# Patient Record
Sex: FEMALE | Race: WHITE | NOT HISPANIC OR LATINO | Employment: OTHER | URBAN - METROPOLITAN AREA
[De-identification: names, ages, dates, MRNs, and addresses within clinical notes are randomized per-mention and may not be internally consistent; named-entity substitution may affect disease eponyms.]

---

## 2024-02-20 ENCOUNTER — OFFICE VISIT (OUTPATIENT)
Dept: PRIMARY CARE | Facility: CLINIC | Age: 74
End: 2024-02-20
Payer: MEDICARE

## 2024-02-20 VITALS
WEIGHT: 159 LBS | HEIGHT: 57 IN | HEART RATE: 86 BPM | BODY MASS INDEX: 34.3 KG/M2 | SYSTOLIC BLOOD PRESSURE: 146 MMHG | DIASTOLIC BLOOD PRESSURE: 88 MMHG | OXYGEN SATURATION: 96 %

## 2024-02-20 DIAGNOSIS — E11.36 TYPE 2 DIABETES MELLITUS WITH DIABETIC CATARACT, WITHOUT LONG-TERM CURRENT USE OF INSULIN (MULTI): ICD-10-CM

## 2024-02-20 DIAGNOSIS — Z76.89 ENCOUNTER TO ESTABLISH CARE WITH NEW DOCTOR: ICD-10-CM

## 2024-02-20 DIAGNOSIS — R30.0 DYSURIA: ICD-10-CM

## 2024-02-20 DIAGNOSIS — R32 URINARY INCONTINENCE, UNSPECIFIED TYPE: ICD-10-CM

## 2024-02-20 DIAGNOSIS — I10 ESSENTIAL HYPERTENSION: ICD-10-CM

## 2024-02-20 DIAGNOSIS — E78.5 HYPERLIPIDEMIA, UNSPECIFIED HYPERLIPIDEMIA TYPE: ICD-10-CM

## 2024-02-20 DIAGNOSIS — J18.9 PNEUMONIA OF LEFT LOWER LOBE DUE TO INFECTIOUS ORGANISM: Primary | ICD-10-CM

## 2024-02-20 PROCEDURE — 1036F TOBACCO NON-USER: CPT | Performed by: STUDENT IN AN ORGANIZED HEALTH CARE EDUCATION/TRAINING PROGRAM

## 2024-02-20 PROCEDURE — 3077F SYST BP >= 140 MM HG: CPT | Performed by: STUDENT IN AN ORGANIZED HEALTH CARE EDUCATION/TRAINING PROGRAM

## 2024-02-20 PROCEDURE — 99204 OFFICE O/P NEW MOD 45 MIN: CPT | Performed by: STUDENT IN AN ORGANIZED HEALTH CARE EDUCATION/TRAINING PROGRAM

## 2024-02-20 PROCEDURE — G2211 COMPLEX E/M VISIT ADD ON: HCPCS | Performed by: STUDENT IN AN ORGANIZED HEALTH CARE EDUCATION/TRAINING PROGRAM

## 2024-02-20 PROCEDURE — 1159F MED LIST DOCD IN RCRD: CPT | Performed by: STUDENT IN AN ORGANIZED HEALTH CARE EDUCATION/TRAINING PROGRAM

## 2024-02-20 PROCEDURE — 3079F DIAST BP 80-89 MM HG: CPT | Performed by: STUDENT IN AN ORGANIZED HEALTH CARE EDUCATION/TRAINING PROGRAM

## 2024-02-20 RX ORDER — LEVOFLOXACIN 750 MG/1
750 TABLET ORAL DAILY
Qty: 7 TABLET | Refills: 0 | Status: SHIPPED | OUTPATIENT
Start: 2024-02-20 | End: 2024-03-04 | Stop reason: ALTCHOICE

## 2024-02-20 RX ORDER — AMLODIPINE BESYLATE 5 MG/1
TABLET ORAL DAILY
COMMUNITY
End: 2024-05-09 | Stop reason: SDUPTHER

## 2024-02-20 RX ORDER — GLIMEPIRIDE 2 MG/1
2 TABLET ORAL
COMMUNITY
End: 2024-05-09 | Stop reason: SDUPTHER

## 2024-02-20 RX ORDER — ROSUVASTATIN CALCIUM 10 MG/1
10 TABLET, COATED ORAL DAILY
COMMUNITY
End: 2024-03-04 | Stop reason: SDUPTHER

## 2024-02-20 ASSESSMENT — ENCOUNTER SYMPTOMS
CONSTIPATION: 0
DECREASED CONCENTRATION: 0
FREQUENCY: 1
NERVOUS/ANXIOUS: 1
WOUND: 0
RHINORRHEA: 0
VOMITING: 0
CHILLS: 0
DYSURIA: 1
DIARRHEA: 0
SINUS PRESSURE: 0
FATIGUE: 0
BLOOD IN STOOL: 0
FEVER: 0
COUGH: 1
DIZZINESS: 1
PALPITATIONS: 0
LIGHT-HEADEDNESS: 1
SINUS PAIN: 0
MYALGIAS: 0
ARTHRALGIAS: 0
SHORTNESS OF BREATH: 1
WHEEZING: 0

## 2024-02-20 ASSESSMENT — PATIENT HEALTH QUESTIONNAIRE - PHQ9
1. LITTLE INTEREST OR PLEASURE IN DOING THINGS: SEVERAL DAYS
2. FEELING DOWN, DEPRESSED OR HOPELESS: SEVERAL DAYS
SUM OF ALL RESPONSES TO PHQ9 QUESTIONS 1 AND 2: 2

## 2024-02-20 NOTE — PROGRESS NOTES
"Subjective   Patient ID: Vee Pimentel is a 73 y.o. female with past medical history of who presents for Establish Care (Urinary incontinence. Left eye issues. Cough for 6 weeks.  ).    Patient is here to establish care.  Recently moved to [place] from [place].    Patient has been having incontinence of urine, rare incontinence of stool for past 6 weeks.  Balance has been a problem for the same time period.     Patient has been having a cough productive of sputum for approximately 2 months.  Had a course of Keflex without improvement.     PMHX: HTN, DM2  SHX: appendectomy, hysterectomy, tonsillectomy, carpal tunnel release, cataract, retina detachment  Social: no tobacco, no EtOH.    Family: none known        Review of Systems   Constitutional:  Negative for chills, fatigue and fever.   HENT:  Negative for congestion, rhinorrhea, sinus pressure and sinus pain.    Respiratory:  Positive for cough and shortness of breath. Negative for wheezing.    Cardiovascular:  Negative for chest pain, palpitations and leg swelling.   Gastrointestinal:  Negative for blood in stool, constipation, diarrhea and vomiting.   Genitourinary:  Positive for dysuria, frequency and urgency.        Urinary incontinence   Musculoskeletal:  Negative for arthralgias and myalgias.   Skin:  Negative for pallor, rash and wound.   Neurological:  Positive for dizziness and light-headedness.   Psychiatric/Behavioral:  Negative for decreased concentration. The patient is nervous/anxious.        Objective     Visit Vitals  /88   Pulse 86   Ht 1.448 m (4' 9\")   Wt 72.1 kg (159 lb)   SpO2 96%   BMI 34.41 kg/m²   Smoking Status Never   BSA 1.7 m²         Physical Exam  Constitutional:       Appearance: Normal appearance.   HENT:      Head: Normocephalic and atraumatic.      Right Ear: Tympanic membrane, ear canal and external ear normal.      Left Ear: Tympanic membrane, ear canal and external ear normal.      Nose: Nose normal.      Mouth/Throat:      " Mouth: Mucous membranes are moist.      Pharynx: Oropharynx is clear.   Eyes:      Extraocular Movements: Extraocular movements intact.      Conjunctiva/sclera: Conjunctivae normal.   Cardiovascular:      Rate and Rhythm: Normal rate and regular rhythm.      Pulses:           Dorsalis pedis pulses are 1+ on the right side and 1+ on the left side.        Posterior tibial pulses are 1+ on the right side and 1+ on the left side.      Heart sounds: Normal heart sounds.   Pulmonary:      Effort: Pulmonary effort is normal.      Breath sounds: Rales (left lower fields) present.   Abdominal:      General: There is no distension.      Palpations: Abdomen is soft.      Tenderness: There is no abdominal tenderness.   Skin:     General: Skin is warm and dry.   Neurological:      Mental Status: She is alert.      Gait: Abnormal gait: shuffling gait.   Psychiatric:         Mood and Affect: Mood normal.         Behavior: Behavior normal.         Assessment/Plan   Problem List Items Addressed This Visit    None  Visit Diagnoses       Pneumonia of left lower lobe due to infectious organism    -  Primary    Relevant Orders    XR chest 2 views    Type 2 diabetes mellitus with diabetic cataract, without long-term current use of insulin (CMS/[facility])        Relevant Orders    CBC and Auto Differential    Comprehensive metabolic panel    Hemoglobin A1c    Tsh With Reflex To Free T4 If Abnormal    Hyperlipidemia, unspecified hyperlipidemia type        Relevant Orders    Comprehensive metabolic panel    Lipid Panel    Essential hypertension        Relevant Orders    CBC and Auto Differential    Comprehensive metabolic panel    Tsh With Reflex To Free T4 If Abnormal        Patient's presentation today somewhat concerning for NPH, however picture is clouded by active acute illness.  Would consider advanced imaging of head/brain once currently illness is adequately treated.     Patient seen and discussed with attending physician, Dr Kvng Kaminski  [place],  PGY3  Family Medicine

## 2024-02-21 ENCOUNTER — HOSPITAL ENCOUNTER (OUTPATIENT)
Dept: RADIOLOGY | Facility: CLINIC | Age: 74
Discharge: HOME | End: 2024-02-21
Payer: MEDICARE

## 2024-02-21 ENCOUNTER — LAB (OUTPATIENT)
Dept: LAB | Facility: LAB | Age: 74
End: 2024-02-21
Payer: MEDICARE

## 2024-02-21 DIAGNOSIS — I10 ESSENTIAL HYPERTENSION: ICD-10-CM

## 2024-02-21 DIAGNOSIS — E78.5 HYPERLIPIDEMIA, UNSPECIFIED HYPERLIPIDEMIA TYPE: ICD-10-CM

## 2024-02-21 DIAGNOSIS — J18.9 PNEUMONIA OF LEFT LOWER LOBE DUE TO INFECTIOUS ORGANISM: ICD-10-CM

## 2024-02-21 DIAGNOSIS — E11.36 TYPE 2 DIABETES MELLITUS WITH DIABETIC CATARACT, WITHOUT LONG-TERM CURRENT USE OF INSULIN (MULTI): ICD-10-CM

## 2024-02-21 LAB
ALBUMIN SERPL BCP-MCNC: 3.6 G/DL (ref 3.4–5)
ALP SERPL-CCNC: 76 U/L (ref 33–136)
ALT SERPL W P-5'-P-CCNC: 13 U/L (ref 7–45)
ANION GAP SERPL CALC-SCNC: 15 MMOL/L (ref 10–20)
AST SERPL W P-5'-P-CCNC: 13 U/L (ref 9–39)
BASOPHILS # BLD AUTO: 0.03 X10*3/UL (ref 0–0.1)
BASOPHILS NFR BLD AUTO: 0.4 %
BILIRUB SERPL-MCNC: 0.4 MG/DL (ref 0–1.2)
BUN SERPL-MCNC: 12 MG/DL (ref 6–23)
CALCIUM SERPL-MCNC: 9.7 MG/DL (ref 8.6–10.3)
CHLORIDE SERPL-SCNC: 101 MMOL/L (ref 98–107)
CHOLEST SERPL-MCNC: 265 MG/DL (ref 0–199)
CHOLESTEROL/HDL RATIO: 5.4
CO2 SERPL-SCNC: 26 MMOL/L (ref 21–32)
CREAT SERPL-MCNC: 0.68 MG/DL (ref 0.5–1.05)
EGFRCR SERPLBLD CKD-EPI 2021: >90 ML/MIN/1.73M*2
EOSINOPHIL # BLD AUTO: 0.07 X10*3/UL (ref 0–0.4)
EOSINOPHIL NFR BLD AUTO: 1 %
ERYTHROCYTE [DISTWIDTH] IN BLOOD BY AUTOMATED COUNT: 14.3 % (ref 11.5–14.5)
GLUCOSE SERPL-MCNC: 213 MG/DL (ref 74–99)
HCT VFR BLD AUTO: 41.9 % (ref 36–46)
HDLC SERPL-MCNC: 49.3 MG/DL
HGB BLD-MCNC: 13.7 G/DL (ref 12–16)
IMM GRANULOCYTES # BLD AUTO: 0.03 X10*3/UL (ref 0–0.5)
IMM GRANULOCYTES NFR BLD AUTO: 0.4 % (ref 0–0.9)
LDLC SERPL CALC-MCNC: 169 MG/DL
LYMPHOCYTES # BLD AUTO: 2.26 X10*3/UL (ref 0.8–3)
LYMPHOCYTES NFR BLD AUTO: 32.8 %
MCH RBC QN AUTO: 30.4 PG (ref 26–34)
MCHC RBC AUTO-ENTMCNC: 32.7 G/DL (ref 32–36)
MCV RBC AUTO: 93 FL (ref 80–100)
MONOCYTES # BLD AUTO: 0.53 X10*3/UL (ref 0.05–0.8)
MONOCYTES NFR BLD AUTO: 7.7 %
NEUTROPHILS # BLD AUTO: 3.98 X10*3/UL (ref 1.6–5.5)
NEUTROPHILS NFR BLD AUTO: 57.7 %
NON HDL CHOLESTEROL: 216 MG/DL (ref 0–149)
NRBC BLD-RTO: 0 /100 WBCS (ref 0–0)
PLATELET # BLD AUTO: 297 X10*3/UL (ref 150–450)
POTASSIUM SERPL-SCNC: 4.3 MMOL/L (ref 3.5–5.3)
PROT SERPL-MCNC: 7.2 G/DL (ref 6.4–8.2)
RBC # BLD AUTO: 4.51 X10*6/UL (ref 4–5.2)
SODIUM SERPL-SCNC: 138 MMOL/L (ref 136–145)
TRIGL SERPL-MCNC: 232 MG/DL (ref 0–149)
TSH SERPL-ACNC: 1.1 MIU/L (ref 0.44–3.98)
VLDL: 46 MG/DL (ref 0–40)
WBC # BLD AUTO: 6.9 X10*3/UL (ref 4.4–11.3)

## 2024-02-21 PROCEDURE — 36415 COLL VENOUS BLD VENIPUNCTURE: CPT

## 2024-02-21 PROCEDURE — 83036 HEMOGLOBIN GLYCOSYLATED A1C: CPT

## 2024-02-21 PROCEDURE — 84443 ASSAY THYROID STIM HORMONE: CPT

## 2024-02-21 PROCEDURE — 80053 COMPREHEN METABOLIC PANEL: CPT

## 2024-02-21 PROCEDURE — 71046 X-RAY EXAM CHEST 2 VIEWS: CPT

## 2024-02-21 PROCEDURE — 71046 X-RAY EXAM CHEST 2 VIEWS: CPT | Performed by: RADIOLOGY

## 2024-02-21 PROCEDURE — 85025 COMPLETE CBC W/AUTO DIFF WBC: CPT

## 2024-02-21 PROCEDURE — 80061 LIPID PANEL: CPT

## 2024-02-22 ENCOUNTER — TELEPHONE (OUTPATIENT)
Dept: PRIMARY CARE | Facility: CLINIC | Age: 74
End: 2024-02-22

## 2024-02-22 ENCOUNTER — LAB (OUTPATIENT)
Dept: LAB | Facility: LAB | Age: 74
End: 2024-02-22
Payer: MEDICARE

## 2024-02-22 DIAGNOSIS — R30.0 DYSURIA: ICD-10-CM

## 2024-02-22 DIAGNOSIS — R32 URINARY INCONTINENCE, UNSPECIFIED TYPE: ICD-10-CM

## 2024-02-22 LAB
AMORPH CRY #/AREA UR COMP ASSIST: ABNORMAL /HPF
APPEARANCE UR: ABNORMAL
BACTERIA #/AREA URNS AUTO: ABNORMAL /HPF
BILIRUB UR STRIP.AUTO-MCNC: NEGATIVE MG/DL
COLOR UR: YELLOW
EST. AVERAGE GLUCOSE BLD GHB EST-MCNC: 212 MG/DL
GLUCOSE UR STRIP.AUTO-MCNC: NEGATIVE MG/DL
HBA1C MFR BLD: 9 %
KETONES UR STRIP.AUTO-MCNC: NEGATIVE MG/DL
LEUKOCYTE ESTERASE UR QL STRIP.AUTO: ABNORMAL
MUCOUS THREADS #/AREA URNS AUTO: ABNORMAL /LPF
NITRITE UR QL STRIP.AUTO: NEGATIVE
PH UR STRIP.AUTO: 5 [PH]
PROT UR STRIP.AUTO-MCNC: NEGATIVE MG/DL
RBC # UR STRIP.AUTO: NEGATIVE /UL
RBC #/AREA URNS AUTO: ABNORMAL /HPF
SP GR UR STRIP.AUTO: 1.01
UROBILINOGEN UR STRIP.AUTO-MCNC: <2 MG/DL
WBC #/AREA URNS AUTO: ABNORMAL /HPF

## 2024-02-22 PROCEDURE — 87086 URINE CULTURE/COLONY COUNT: CPT

## 2024-02-22 PROCEDURE — 81001 URINALYSIS AUTO W/SCOPE: CPT

## 2024-02-23 LAB
BACTERIA UR CULT: NO GROWTH
BACTERIA UR CULT: NORMAL
HOLD SPECIMEN: NORMAL

## 2024-02-23 NOTE — TELEPHONE ENCOUNTER
I'm not seeing any authorization to release information in her chart.  I do not believe I can discuss with sister without this.  Patient is also scheduled for follow up on 3/4/24

## 2024-02-26 NOTE — TELEPHONE ENCOUNTER
Racquel, please see Dr. Last's reply. We specifically told Vee we are discussing all of her labs on 3/4/24 since there's a lot we have to go over. If Vee can bring all of her meds to her next appt, that'll be great. Thanks.

## 2024-02-28 PROBLEM — I10 PRIMARY HYPERTENSION: Status: ACTIVE | Noted: 2024-02-28

## 2024-02-28 PROBLEM — E11.9 TYPE 2 DIABETES MELLITUS (MULTI): Status: ACTIVE | Noted: 2024-02-28

## 2024-02-28 PROBLEM — R32 URINARY INCONTINENCE: Status: ACTIVE | Noted: 2024-02-28

## 2024-02-28 PROBLEM — J18.9 PNEUMONIA DUE TO INFECTIOUS ORGANISM: Status: ACTIVE | Noted: 2024-02-28

## 2024-02-28 PROBLEM — R30.0 DYSURIA: Status: ACTIVE | Noted: 2024-02-28

## 2024-02-28 PROBLEM — E78.5 HYPERLIPIDEMIA: Status: ACTIVE | Noted: 2024-02-28

## 2024-03-04 ENCOUNTER — OFFICE VISIT (OUTPATIENT)
Dept: PRIMARY CARE | Facility: CLINIC | Age: 74
End: 2024-03-04

## 2024-03-04 VITALS
SYSTOLIC BLOOD PRESSURE: 142 MMHG | BODY MASS INDEX: 36.46 KG/M2 | OXYGEN SATURATION: 97 % | HEART RATE: 101 BPM | HEIGHT: 57 IN | DIASTOLIC BLOOD PRESSURE: 85 MMHG | WEIGHT: 169 LBS

## 2024-03-04 DIAGNOSIS — R42 VERTIGO: ICD-10-CM

## 2024-03-04 DIAGNOSIS — E11.65 TYPE 2 DIABETES MELLITUS WITH HYPERGLYCEMIA, WITHOUT LONG-TERM CURRENT USE OF INSULIN (MULTI): Primary | ICD-10-CM

## 2024-03-04 DIAGNOSIS — Z79.899 MEDICATION MANAGEMENT: ICD-10-CM

## 2024-03-04 DIAGNOSIS — I10 PRIMARY HYPERTENSION: ICD-10-CM

## 2024-03-04 DIAGNOSIS — R32 URINARY INCONTINENCE, UNSPECIFIED TYPE: ICD-10-CM

## 2024-03-04 DIAGNOSIS — J18.9 PNEUMONIA OF LEFT LOWER LOBE DUE TO INFECTIOUS ORGANISM: ICD-10-CM

## 2024-03-04 DIAGNOSIS — E78.2 MIXED HYPERLIPIDEMIA: ICD-10-CM

## 2024-03-04 PROBLEM — S83.206D: Status: ACTIVE | Noted: 2024-03-04

## 2024-03-04 PROCEDURE — 3050F LDL-C >= 130 MG/DL: CPT | Performed by: STUDENT IN AN ORGANIZED HEALTH CARE EDUCATION/TRAINING PROGRAM

## 2024-03-04 PROCEDURE — 3079F DIAST BP 80-89 MM HG: CPT | Performed by: STUDENT IN AN ORGANIZED HEALTH CARE EDUCATION/TRAINING PROGRAM

## 2024-03-04 PROCEDURE — G2211 COMPLEX E/M VISIT ADD ON: HCPCS | Performed by: STUDENT IN AN ORGANIZED HEALTH CARE EDUCATION/TRAINING PROGRAM

## 2024-03-04 PROCEDURE — 3077F SYST BP >= 140 MM HG: CPT | Performed by: STUDENT IN AN ORGANIZED HEALTH CARE EDUCATION/TRAINING PROGRAM

## 2024-03-04 PROCEDURE — 1159F MED LIST DOCD IN RCRD: CPT | Performed by: STUDENT IN AN ORGANIZED HEALTH CARE EDUCATION/TRAINING PROGRAM

## 2024-03-04 PROCEDURE — 1036F TOBACCO NON-USER: CPT | Performed by: STUDENT IN AN ORGANIZED HEALTH CARE EDUCATION/TRAINING PROGRAM

## 2024-03-04 PROCEDURE — 99215 OFFICE O/P EST HI 40 MIN: CPT | Performed by: STUDENT IN AN ORGANIZED HEALTH CARE EDUCATION/TRAINING PROGRAM

## 2024-03-04 PROCEDURE — 4010F ACE/ARB THERAPY RXD/TAKEN: CPT | Performed by: STUDENT IN AN ORGANIZED HEALTH CARE EDUCATION/TRAINING PROGRAM

## 2024-03-04 PROCEDURE — 3052F HG A1C>EQUAL 8.0%<EQUAL 9.0%: CPT | Performed by: STUDENT IN AN ORGANIZED HEALTH CARE EDUCATION/TRAINING PROGRAM

## 2024-03-04 RX ORDER — LOSARTAN POTASSIUM 100 MG/1
100 TABLET ORAL DAILY
COMMUNITY
End: 2024-05-09 | Stop reason: SDUPTHER

## 2024-03-04 RX ORDER — ROSUVASTATIN CALCIUM 20 MG/1
20 TABLET, COATED ORAL DAILY
Qty: 90 TABLET | Refills: 0 | Status: SHIPPED | OUTPATIENT
Start: 2024-03-04 | End: 2024-05-09 | Stop reason: SDUPTHER

## 2024-03-04 RX ORDER — DOXYCYCLINE 100 MG/1
100 CAPSULE ORAL 2 TIMES DAILY
Qty: 14 CAPSULE | Refills: 0 | Status: SHIPPED | OUTPATIENT
Start: 2024-03-04 | End: 2024-03-11

## 2024-03-04 ASSESSMENT — ENCOUNTER SYMPTOMS
DIARRHEA: 0
CONSTIPATION: 0
MYALGIAS: 0
LIGHT-HEADEDNESS: 1
ARTHRALGIAS: 0
WHEEZING: 0
WOUND: 0
NERVOUS/ANXIOUS: 0
SINUS PRESSURE: 0
SHORTNESS OF BREATH: 0
DYSPHORIC MOOD: 0
FREQUENCY: 0
DYSURIA: 0
FEVER: 0
SINUS PAIN: 0
FATIGUE: 0
NUMBNESS: 0
PALPITATIONS: 0
RHINORRHEA: 0
NAUSEA: 0
COUGH: 1
DIZZINESS: 1
CHILLS: 0
VOMITING: 0

## 2024-03-04 ASSESSMENT — PATIENT HEALTH QUESTIONNAIRE - PHQ9
2. FEELING DOWN, DEPRESSED OR HOPELESS: NOT AT ALL
SUM OF ALL RESPONSES TO PHQ9 QUESTIONS 1 AND 2: 0
1. LITTLE INTEREST OR PLEASURE IN DOING THINGS: NOT AT ALL

## 2024-03-04 NOTE — PROGRESS NOTES
"Subjective   Patient ID: eVe Pimentel is a 73 y.o. female with past medical history of who presents for No chief complaint on file..    Patient presents today in follow up.      Cough has improved since last visit, but not entirely resolved.      Patient continues to have trouble with incontinence, as well as walking which is not at her baseline.          Review of Systems   Constitutional:  Negative for chills, fatigue and fever.   HENT:  Negative for congestion, rhinorrhea, sinus pressure and sinus pain.    Respiratory:  Positive for cough. Negative for shortness of breath and wheezing.    Cardiovascular:  Positive for leg swelling. Negative for chest pain and palpitations.   Gastrointestinal:  Negative for constipation, diarrhea, nausea and vomiting.   Genitourinary:  Negative for dysuria, frequency and urgency.        Urinary incontinence   Musculoskeletal:  Negative for arthralgias and myalgias.   Skin:  Negative for pallor, rash and wound.   Neurological:  Positive for dizziness and light-headedness. Negative for syncope and numbness.   Psychiatric/Behavioral:  Negative for dysphoric mood. The patient is not nervous/anxious.        Objective     Visit Vitals  /85   Pulse 101   Ht 1.448 m (4' 9\")   Wt 76.7 kg (169 lb)   SpO2 97%   BMI 36.57 kg/m²   Smoking Status Never   BSA 1.76 m²         Physical Exam  Constitutional:       Appearance: Normal appearance.   HENT:      Head: Normocephalic and atraumatic.      Right Ear: External ear normal.      Left Ear: External ear normal.      Nose: Nose normal.   Eyes:      Conjunctiva/sclera: Conjunctivae normal.   Cardiovascular:      Rate and Rhythm: Normal rate and regular rhythm.      Pulses: Normal pulses.   Pulmonary:      Effort: Pulmonary effort is normal.      Breath sounds: Rales (rales once again heart in left lower lung fields) present.   Abdominal:      General: Abdomen is flat.      Palpations: Abdomen is soft.   Musculoskeletal:      Right lower leg: " Edema present.      Left lower leg: Edema present.   Skin:     General: Skin is warm and dry.   Neurological:      Mental Status: She is alert and oriented to person, place, and time.      Gait: Gait abnormal.   Psychiatric:         Mood and Affect: Mood normal.         Behavior: Behavior normal.         Assessment/Plan   Problem List Items Addressed This Visit       Hyperlipidemia    Relevant Medications    rosuvastatin (Crestor) 20 mg tablet    Pneumonia due to infectious organism    Relevant Medications    doxycycline (Vibramycin) 100 mg capsule    Primary hypertension    Relevant Orders    Transthoracic Echo Complete    ECG 12 lead (Ancillary Performed)    Type 2 diabetes mellitus (CMS/HCC) - Primary    Urinary incontinence    Relevant Orders    CT head wo IV contrast     Other Visit Diagnoses       Vertigo        Relevant Orders    CT head wo IV contrast        Increased rosuvastatin to 20 mg daily.      Patient still with left lower lung crackles on exam.  Levofloxacin may not have been appropriately targeted, will try doxycycline.  Also on the differential would be heart failure, so will obtain transthoracic echocardiogram.     Patient's symptoms of memory/cognitive changes, gait disturbance, and urinary incontinence are concerning for NPH or other intracranial pathology when taken as a whole.  Will obtain CT head, consider MRI brain if negative.     Patient given sample starter pack of Rybelsus, plan to increase dose if tolerated.  Two week supply of Jardiance 25mg every day also provided.    Patient seen and discussed with attending physician, Dr Kvng Last, DO PGY3  Family Medicine

## 2024-03-26 ENCOUNTER — APPOINTMENT (OUTPATIENT)
Dept: RADIOLOGY | Facility: HOSPITAL | Age: 74
End: 2024-03-26

## 2024-03-26 ENCOUNTER — TELEPHONE (OUTPATIENT)
Dept: PRIMARY CARE | Facility: CLINIC | Age: 74
End: 2024-03-26

## 2024-03-26 DIAGNOSIS — B37.31 YEAST VAGINITIS: Primary | ICD-10-CM

## 2024-03-26 RX ORDER — FLUCONAZOLE 150 MG/1
150 TABLET ORAL ONCE
Qty: 1 TABLET | Refills: 0 | Status: SHIPPED | OUTPATIENT
Start: 2024-03-26 | End: 2024-03-26

## 2024-03-26 NOTE — TELEPHONE ENCOUNTER
Patients sister called stating that she has a bad yeast infection due to the antibiotic that was prescribed a few weeks ago.  They are asking if you would be willing to send in diflucan for her.

## 2024-04-23 ENCOUNTER — HOSPITAL ENCOUNTER (OUTPATIENT)
Dept: RADIOLOGY | Facility: HOSPITAL | Age: 74
Discharge: HOME | End: 2024-04-23
Payer: MEDICARE

## 2024-04-23 DIAGNOSIS — R42 VERTIGO: ICD-10-CM

## 2024-04-23 DIAGNOSIS — R32 URINARY INCONTINENCE, UNSPECIFIED TYPE: ICD-10-CM

## 2024-04-23 PROCEDURE — 70450 CT HEAD/BRAIN W/O DYE: CPT | Performed by: RADIOLOGY

## 2024-04-23 PROCEDURE — 70450 CT HEAD/BRAIN W/O DYE: CPT

## 2024-04-25 ENCOUNTER — HOSPITAL ENCOUNTER (OUTPATIENT)
Dept: CARDIOLOGY | Facility: HOSPITAL | Age: 74
Discharge: HOME | End: 2024-04-25
Payer: MEDICARE

## 2024-04-25 DIAGNOSIS — I10 PRIMARY HYPERTENSION: ICD-10-CM

## 2024-04-25 PROCEDURE — 93306 TTE W/DOPPLER COMPLETE: CPT

## 2024-04-25 PROCEDURE — 93306 TTE W/DOPPLER COMPLETE: CPT | Performed by: INTERNAL MEDICINE

## 2024-04-30 LAB
AORTIC VALVE MEAN GRADIENT: 5 MMHG
AORTIC VALVE PEAK VELOCITY: 1.47 M/S
AV PEAK GRADIENT: 8.6 MMHG
AVA (PEAK VEL): 2.01 CM2
AVA (VTI): 2.09 CM2
EJECTION FRACTION APICAL 4 CHAMBER: 62.7
LEFT ATRIUM VOLUME AREA LENGTH INDEX BSA: 15.8 ML/M2
LEFT VENTRICLE INTERNAL DIMENSION DIASTOLE: 3.43 CM (ref 3.5–6)
LEFT VENTRICULAR OUTFLOW TRACT DIAMETER: 2 CM
LV EJECTION FRACTION BIPLANE: 62 %
MITRAL VALVE E/A RATIO: 0.66
MITRAL VALVE E/E' RATIO: 9.5
RIGHT VENTRICLE FREE WALL PEAK S': 13.4 CM/S
RIGHT VENTRICLE PEAK SYSTOLIC PRESSURE: 24.1 MMHG
TRICUSPID ANNULAR PLANE SYSTOLIC EXCURSION: 2 CM

## 2024-05-09 ENCOUNTER — OFFICE VISIT (OUTPATIENT)
Dept: PRIMARY CARE | Facility: CLINIC | Age: 74
End: 2024-05-09
Payer: MEDICARE

## 2024-05-09 VITALS
WEIGHT: 169 LBS | HEIGHT: 57 IN | HEART RATE: 89 BPM | OXYGEN SATURATION: 98 % | SYSTOLIC BLOOD PRESSURE: 140 MMHG | DIASTOLIC BLOOD PRESSURE: 84 MMHG | BODY MASS INDEX: 36.46 KG/M2

## 2024-05-09 DIAGNOSIS — G91.2 NPH (NORMAL PRESSURE HYDROCEPHALUS) (MULTI): Primary | ICD-10-CM

## 2024-05-09 DIAGNOSIS — E78.2 MIXED HYPERLIPIDEMIA: ICD-10-CM

## 2024-05-09 DIAGNOSIS — I10 PRIMARY HYPERTENSION: ICD-10-CM

## 2024-05-09 DIAGNOSIS — E11.65 TYPE 2 DIABETES MELLITUS WITH HYPERGLYCEMIA, WITHOUT LONG-TERM CURRENT USE OF INSULIN (MULTI): ICD-10-CM

## 2024-05-09 PROCEDURE — 3079F DIAST BP 80-89 MM HG: CPT | Performed by: STUDENT IN AN ORGANIZED HEALTH CARE EDUCATION/TRAINING PROGRAM

## 2024-05-09 PROCEDURE — 3050F LDL-C >= 130 MG/DL: CPT | Performed by: STUDENT IN AN ORGANIZED HEALTH CARE EDUCATION/TRAINING PROGRAM

## 2024-05-09 PROCEDURE — 4010F ACE/ARB THERAPY RXD/TAKEN: CPT | Performed by: STUDENT IN AN ORGANIZED HEALTH CARE EDUCATION/TRAINING PROGRAM

## 2024-05-09 PROCEDURE — 99214 OFFICE O/P EST MOD 30 MIN: CPT | Performed by: STUDENT IN AN ORGANIZED HEALTH CARE EDUCATION/TRAINING PROGRAM

## 2024-05-09 PROCEDURE — 3077F SYST BP >= 140 MM HG: CPT | Performed by: STUDENT IN AN ORGANIZED HEALTH CARE EDUCATION/TRAINING PROGRAM

## 2024-05-09 PROCEDURE — 1159F MED LIST DOCD IN RCRD: CPT | Performed by: STUDENT IN AN ORGANIZED HEALTH CARE EDUCATION/TRAINING PROGRAM

## 2024-05-09 PROCEDURE — 3052F HG A1C>EQUAL 8.0%<EQUAL 9.0%: CPT | Performed by: STUDENT IN AN ORGANIZED HEALTH CARE EDUCATION/TRAINING PROGRAM

## 2024-05-09 RX ORDER — GLIMEPIRIDE 2 MG/1
2 TABLET ORAL
Qty: 90 TABLET | Refills: 0 | Status: SHIPPED | OUTPATIENT
Start: 2024-05-09 | End: 2024-05-12 | Stop reason: SDUPTHER

## 2024-05-09 RX ORDER — LOSARTAN POTASSIUM 100 MG/1
100 TABLET ORAL DAILY
Qty: 90 TABLET | Refills: 0 | Status: SHIPPED | OUTPATIENT
Start: 2024-05-09

## 2024-05-09 RX ORDER — ROSUVASTATIN CALCIUM 20 MG/1
20 TABLET, COATED ORAL DAILY
Qty: 90 TABLET | Refills: 0 | Status: SHIPPED | OUTPATIENT
Start: 2024-05-09 | End: 2024-08-07

## 2024-05-09 RX ORDER — AMLODIPINE BESYLATE 5 MG/1
5 TABLET ORAL DAILY
Qty: 90 TABLET | Refills: 0 | Status: SHIPPED | OUTPATIENT
Start: 2024-05-09

## 2024-05-09 ASSESSMENT — PATIENT HEALTH QUESTIONNAIRE - PHQ9
SUM OF ALL RESPONSES TO PHQ9 QUESTIONS 1 AND 2: 0
2. FEELING DOWN, DEPRESSED OR HOPELESS: NOT AT ALL
1. LITTLE INTEREST OR PLEASURE IN DOING THINGS: NOT AT ALL

## 2024-05-09 NOTE — PROGRESS NOTES
"Subjective   Patient ID: Vee Pimentel is a 74 y.o. female who presents for Follow-up.  HPI  Patient is here to follow up on her TTE and CT head results, which is consistent with NPH.    Reports tolerating Jardiance 25mg every day and Rybelsus 3mg every day. Was already on Glimepiride 2mg every day from prior PCP.    Denies new onset headaches, fever, chills, n/v/d, chest pain, SOB, abdominal pain, urinary symptoms, and lower extremity edema.     Review of Systems  All other systems have been reviewed and are negative.    Visit Vitals  /84   Pulse 89   Ht 1.448 m (4' 9\")   Wt 76.7 kg (169 lb)   SpO2 98%   BMI 36.57 kg/m²   Smoking Status Never   BSA 1.76 m²       Objective   Physical Exam  General: Alert and oriented. Appears well-nourished and in no acute distress.  Eyes: PERRLA. EOMI.  Head/neck: Normocephalic. Supple.  Lymphatics: No cervical lymphadenopathy.  Respiratory/Thorax: Clear to auscultation bilaterally. No wheezing.   Cardiovascular: Regular rate and rhythm. No murmurs.  Gastrointestinal: Soft, nontender, nondistended. +BS   Musculoskeletal: ROM intact. No joint swelling. Normal strength   Extremities: Warm and well perfused. No peripheral edema.  Neurological: Wide based gait  Psychological: Appropriate mood and affect.   Skin: No visible rashes or lesions.     Assessment/Plan     1. HTN, controlled: Meeting BP goal for age. Continue current medication regimen.  Educated patient on signs and symptoms of hypotension, such as dizziness, lightheadedness, syncope. Patient is to call the office if they experience any of those symptoms.  Encouraged following a healthy lifestyle, watching salt in diet (less than 2g of sodium a day), avoiding soy sauce and processed meat, and limiting soda drinks, as well as exercising regularly. No smoking. Recommend checking blood pressure daily - same time of the day - and writing the numbers down. Advised to bring BP log to the next appointment.     2. DM2: Last A1C 2 " months ago was 9. Continue Glimepiride 2mg every day and Jardiance 25mg every day. Will keep patient on Rybelsus 3mg every day for now, to minimize any possible side effects with increasing dose to 7mg every day  in preparation for upcoming neurosurgery procedure for NPH. Rybelsus 3mg samples provided for patient.    3. NPH: Likely due to recent falls which occurred prior to when patient moved to [place]. Noticeable wide based gait and memory issues. Neurosurgery referral placed for further evaluation and management. Will follow up on recommendations.     No red flags. Follow up in 1 month for A1C check.    Problem List Items Addressed This Visit       Hyperlipidemia    Relevant Orders    Comprehensive Metabolic Panel    Type 2 diabetes mellitus (Multi)     Other Visit Diagnoses       NPH (normal pressure hydrocephalus) (Multi)    -  Primary    Relevant Orders    Referral to Neurosurgery            I have personally reviewed all available pertinent labs, imaging, and consult notes with the patient.     All questions and concerns were addressed. Patient verbalizes understanding instructions and agrees with established plan of care.     Radha Calderon MD, MS  [facility] Residency Faculty  [facility]  [address] [place] [address]

## 2024-05-12 RX ORDER — GLIMEPIRIDE 2 MG/1
2 TABLET ORAL
Qty: 90 TABLET | Refills: 0 | Status: SHIPPED | OUTPATIENT
Start: 2024-05-12

## 2024-06-03 ENCOUNTER — OFFICE VISIT (OUTPATIENT)
Dept: NEUROSURGERY | Facility: CLINIC | Age: 74
End: 2024-06-03
Payer: MEDICARE

## 2024-06-03 VITALS
DIASTOLIC BLOOD PRESSURE: 79 MMHG | RESPIRATION RATE: 18 BRPM | HEART RATE: 95 BPM | WEIGHT: 166 LBS | SYSTOLIC BLOOD PRESSURE: 152 MMHG | BODY MASS INDEX: 34.85 KG/M2 | HEIGHT: 58 IN

## 2024-06-03 DIAGNOSIS — G91.2 NPH (NORMAL PRESSURE HYDROCEPHALUS) (MULTI): ICD-10-CM

## 2024-06-03 DIAGNOSIS — F01.A0: ICD-10-CM

## 2024-06-03 DIAGNOSIS — G91.3 HYDROCEPHALUS EX VACUO DUE TO BRAIN INJURY (MULTI): ICD-10-CM

## 2024-06-03 DIAGNOSIS — S06.9XAA HYDROCEPHALUS EX VACUO DUE TO BRAIN INJURY (MULTI): ICD-10-CM

## 2024-06-03 PROCEDURE — 3052F HG A1C>EQUAL 8.0%<EQUAL 9.0%: CPT | Performed by: NEUROLOGICAL SURGERY

## 2024-06-03 PROCEDURE — 1126F AMNT PAIN NOTED NONE PRSNT: CPT | Performed by: NEUROLOGICAL SURGERY

## 2024-06-03 PROCEDURE — 1159F MED LIST DOCD IN RCRD: CPT | Performed by: NEUROLOGICAL SURGERY

## 2024-06-03 PROCEDURE — 3077F SYST BP >= 140 MM HG: CPT | Performed by: NEUROLOGICAL SURGERY

## 2024-06-03 PROCEDURE — 99202 OFFICE O/P NEW SF 15 MIN: CPT | Performed by: NEUROLOGICAL SURGERY

## 2024-06-03 PROCEDURE — 3050F LDL-C >= 130 MG/DL: CPT | Performed by: NEUROLOGICAL SURGERY

## 2024-06-03 PROCEDURE — 4010F ACE/ARB THERAPY RXD/TAKEN: CPT | Performed by: NEUROLOGICAL SURGERY

## 2024-06-03 PROCEDURE — 1036F TOBACCO NON-USER: CPT | Performed by: NEUROLOGICAL SURGERY

## 2024-06-03 PROCEDURE — 3078F DIAST BP <80 MM HG: CPT | Performed by: NEUROLOGICAL SURGERY

## 2024-06-03 ASSESSMENT — ENCOUNTER SYMPTOMS
APNEA: 1
FATIGUE: 1
BACK PAIN: 1
CONFUSION: 1
CONSTIPATION: 1
DIARRHEA: 1
FREQUENCY: 1
BRUISES/BLEEDS EASILY: 1
WEAKNESS: 1
DIZZINESS: 1
HEADACHES: 1
LIGHT-HEADEDNESS: 1
SHORTNESS OF BREATH: 1
CARDIOVASCULAR NEGATIVE: 1

## 2024-06-03 ASSESSMENT — PAIN SCALES - GENERAL: PAINLEVEL: 0-NO PAIN

## 2024-06-03 NOTE — PROGRESS NOTES
"Having balance issues since 2007, Urinary incontinent, Memory loss, this has been for 2 years. CT head and told to see me.    74-year-old presents for evaluation of cerebral ventriculomegaly.  For a few years she has been having some problems with her balance and more recently developed problems with memory and urinary incontinence.  She has not had any falls but feels very unsteady.    Review of Systems   Constitutional:  Positive for fatigue.   HENT: Negative.     Eyes:  Positive for visual disturbance.   Respiratory:  Positive for apnea and shortness of breath.    Cardiovascular: Negative.    Gastrointestinal:  Positive for constipation and diarrhea.   Genitourinary:  Positive for frequency and urgency.   Musculoskeletal:  Positive for back pain and gait problem.   Skin: Negative.    Allergic/Immunologic: Positive for environmental allergies and food allergies.   Neurological:  Positive for dizziness, weakness, light-headedness and headaches.   Hematological:  Bruises/bleeds easily.   Psychiatric/Behavioral:  Positive for confusion.        Visit Vitals  /79   Pulse 95   Resp 18   Ht 1.461 m (4' 9.5\")   Wt 75.3 kg (166 lb)   BMI 35.30 kg/m²   Smoking Status Never   BSA 1.75 m²           Current Outpatient Medications:     amLODIPine (Norvasc) 5 mg tablet, Take 1 tablet (5 mg) by mouth once daily., Disp: 90 tablet, Rfl: 0    empagliflozin (Jardiance) 25 mg, Take 1 tablet (25 mg) by mouth once daily., Disp: 30 tablet, Rfl: 2    glimepiride (Amaryl) 2 mg tablet, Take 1 tablet (2 mg) by mouth once daily in the morning. Take before meals., Disp: 90 tablet, Rfl: 0    losartan (Cozaar) 100 mg tablet, Take 1 tablet (100 mg) by mouth once daily., Disp: 90 tablet, Rfl: 0    rosuvastatin (Crestor) 20 mg tablet, Take 1 tablet (20 mg) by mouth once daily., Disp: 90 tablet, Rfl: 0      Objective   Neurological Exam    On exam, the patient is alert and interactive.  Her language comprehension and production are intact.  " She easily follows the conversation and participates appropriately.  Extraocular movements are intact.  Face move symmetrically.  She moves all extremities symmetrically.    A CAT scan of the head that I personally reviewed demonstrates mild diffuse atrophy and ventriculomegaly.    The patient's clinical presentation and imaging may be consistent with normal pressure hydrocephalus.  For further evaluation, I have offered her lumbar puncture testing with neuropsychological evaluation to see if removal of spinal fluid improves her condition.  If it does, she may be a candidate for surgery for placement of a permanent shunt.

## 2024-06-17 ENCOUNTER — HOSPITAL ENCOUNTER (OUTPATIENT)
Dept: RADIOLOGY | Facility: HOSPITAL | Age: 74
Discharge: HOME | End: 2024-06-17
Payer: MEDICARE

## 2024-06-17 ENCOUNTER — LAB (OUTPATIENT)
Dept: LAB | Facility: LAB | Age: 74
End: 2024-06-17
Payer: MEDICARE

## 2024-06-17 DIAGNOSIS — G91.2 NPH (NORMAL PRESSURE HYDROCEPHALUS) (MULTI): ICD-10-CM

## 2024-06-17 DIAGNOSIS — S06.9XAA HYDROCEPHALUS EX VACUO DUE TO BRAIN INJURY (MULTI): ICD-10-CM

## 2024-06-17 DIAGNOSIS — G91.3 HYDROCEPHALUS EX VACUO DUE TO BRAIN INJURY (MULTI): ICD-10-CM

## 2024-06-17 DIAGNOSIS — F01.A0: ICD-10-CM

## 2024-06-17 LAB
APTT PPP: 34 SECONDS (ref 27–38)
ERYTHROCYTE [DISTWIDTH] IN BLOOD BY AUTOMATED COUNT: 12.7 % (ref 11.5–14.5)
HCT VFR BLD AUTO: 44.4 % (ref 36–46)
HGB BLD-MCNC: 14.3 G/DL (ref 12–16)
INR PPP: 1 (ref 0.9–1.1)
MCH RBC QN AUTO: 29.9 PG (ref 26–34)
MCHC RBC AUTO-ENTMCNC: 32.2 G/DL (ref 32–36)
MCV RBC AUTO: 93 FL (ref 80–100)
NRBC BLD-RTO: 0 /100 WBCS (ref 0–0)
PLATELET # BLD AUTO: 246 X10*3/UL (ref 150–450)
PROTHROMBIN TIME: 11.1 SECONDS (ref 9.8–12.8)
RBC # BLD AUTO: 4.79 X10*6/UL (ref 4–5.2)
WBC # BLD AUTO: 7 X10*3/UL (ref 4.4–11.3)

## 2024-06-17 PROCEDURE — 36415 COLL VENOUS BLD VENIPUNCTURE: CPT

## 2024-06-17 PROCEDURE — 85027 COMPLETE CBC AUTOMATED: CPT

## 2024-06-17 PROCEDURE — 85730 THROMBOPLASTIN TIME PARTIAL: CPT

## 2024-06-17 PROCEDURE — 85610 PROTHROMBIN TIME: CPT

## 2024-06-17 PROCEDURE — 70450 CT HEAD/BRAIN W/O DYE: CPT | Performed by: RADIOLOGY

## 2024-06-17 PROCEDURE — 70450 CT HEAD/BRAIN W/O DYE: CPT

## 2024-06-19 ENCOUNTER — HOSPITAL ENCOUNTER (OUTPATIENT)
Dept: RADIOLOGY | Facility: HOSPITAL | Age: 74
Discharge: HOME | End: 2024-06-19
Payer: MEDICARE

## 2024-06-19 ENCOUNTER — CLINICAL SUPPORT (OUTPATIENT)
Dept: PSYCHOLOGY | Facility: HOSPITAL | Age: 74
End: 2024-06-19
Payer: MEDICARE

## 2024-06-19 VITALS
HEART RATE: 74 BPM | RESPIRATION RATE: 17 BRPM | SYSTOLIC BLOOD PRESSURE: 147 MMHG | OXYGEN SATURATION: 99 % | DIASTOLIC BLOOD PRESSURE: 81 MMHG

## 2024-06-19 DIAGNOSIS — R41.843 PSYCHOMOTOR DEFICIT: ICD-10-CM

## 2024-06-19 DIAGNOSIS — R32 URINARY INCONTINENCE, UNSPECIFIED TYPE: ICD-10-CM

## 2024-06-19 DIAGNOSIS — G91.2 NPH (NORMAL PRESSURE HYDROCEPHALUS) (MULTI): ICD-10-CM

## 2024-06-19 DIAGNOSIS — G93.89 CEREBRAL VENTRICULOMEGALY: ICD-10-CM

## 2024-06-19 DIAGNOSIS — R41.3 MEMORY DIFFICULTY: Primary | ICD-10-CM

## 2024-06-19 DIAGNOSIS — R35.0 URINARY FREQUENCY: ICD-10-CM

## 2024-06-19 DIAGNOSIS — R26.9 GAIT DISTURBANCE: ICD-10-CM

## 2024-06-19 DIAGNOSIS — R39.15 URINARY URGENCY: ICD-10-CM

## 2024-06-19 LAB
APPEARANCE CSF: CLEAR
APPEARANCE CSF: CLEAR
BASOPHILS NFR CSF MANUAL: 0 %
BASOPHILS NFR CSF MANUAL: 0 %
BLASTS CSF MANUAL: 0 %
BLASTS CSF MANUAL: 0 %
COLOR CSF: COLORLESS
COLOR CSF: COLORLESS
COLOR SPUN CSF: COLORLESS
COLOR SPUN CSF: COLORLESS
EOSINOPHIL NFR CSF MANUAL: 0 %
EOSINOPHIL NFR CSF MANUAL: 0 %
GLUCOSE CSF-MCNC: 79 MG/DL (ref 40–70)
IMM GRANULOCYTES NFR CSF: 0 %
IMM GRANULOCYTES NFR CSF: 0 %
LYMPHOCYTES NFR CSF MANUAL: 100 % (ref 28–96)
LYMPHOCYTES NFR CSF MANUAL: 80 % (ref 28–96)
MONOS+MACROS NFR CSF MANUAL: 0 % (ref 16–56)
MONOS+MACROS NFR CSF MANUAL: 20 % (ref 16–56)
NEUTS SEG NFR CSF MANUAL: 0 % (ref 0–5)
NEUTS SEG NFR CSF MANUAL: 0 % (ref 0–5)
OTHER CELLS NFR CSF MANUAL: 0 %
OTHER CELLS NFR CSF MANUAL: 0 %
PLASMA CELLS NFR CSF MICRO: 0 %
PLASMA CELLS NFR CSF MICRO: 0 %
PROT CSF-MCNC: 52 MG/DL (ref 15–45)
RBC # CSF AUTO: 0 /UL (ref 0–5)
RBC # CSF AUTO: 5 /UL (ref 0–5)
TOTAL CELLS COUNTED CSF: 15
TOTAL CELLS COUNTED CSF: 29
TUBE # CSF: NORMAL
TUBE # CSF: NORMAL
WBC # CSF AUTO: 2 /UL (ref 1–5)
WBC # CSF AUTO: 3 /UL (ref 1–5)

## 2024-06-19 PROCEDURE — 87070 CULTURE OTHR SPECIMN AEROBIC: CPT | Performed by: NEUROLOGICAL SURGERY

## 2024-06-19 PROCEDURE — 96139 PSYCL/NRPSYC TST TECH EA: CPT | Mod: AH,GC | Performed by: CLINICAL NEUROPSYCHOLOGIST

## 2024-06-19 PROCEDURE — 96138 PSYCL/NRPSYC TECH 1ST: CPT | Performed by: CLINICAL NEUROPSYCHOLOGIST

## 2024-06-19 PROCEDURE — 7100000009 HC PHASE TWO TIME - INITIAL BASE CHARGE

## 2024-06-19 PROCEDURE — 96133 NRPSYC TST EVAL PHYS/QHP EA: CPT | Mod: AH,GC | Performed by: CLINICAL NEUROPSYCHOLOGIST

## 2024-06-19 PROCEDURE — 96139 PSYCL/NRPSYC TST TECH EA: CPT | Performed by: CLINICAL NEUROPSYCHOLOGIST

## 2024-06-19 PROCEDURE — 89051 BODY FLUID CELL COUNT: CPT | Mod: 91 | Performed by: NEUROLOGICAL SURGERY

## 2024-06-19 PROCEDURE — 96132 NRPSYC TST EVAL PHYS/QHP 1ST: CPT | Mod: AH,GC | Performed by: CLINICAL NEUROPSYCHOLOGIST

## 2024-06-19 PROCEDURE — 62328 DX LMBR SPI PNXR W/FLUOR/CT: CPT

## 2024-06-19 PROCEDURE — 96133 NRPSYC TST EVAL PHYS/QHP EA: CPT | Performed by: CLINICAL NEUROPSYCHOLOGIST

## 2024-06-19 PROCEDURE — 96116 NUBHVL XM PHYS/QHP 1ST HR: CPT | Mod: AH,GC | Performed by: CLINICAL NEUROPSYCHOLOGIST

## 2024-06-19 PROCEDURE — 96116 NUBHVL XM PHYS/QHP 1ST HR: CPT | Performed by: CLINICAL NEUROPSYCHOLOGIST

## 2024-06-19 PROCEDURE — 7100000010 HC PHASE TWO TIME - EACH INCREMENTAL 1 MINUTE

## 2024-06-19 PROCEDURE — 96138 PSYCL/NRPSYC TECH 1ST: CPT | Mod: AH,GC | Performed by: CLINICAL NEUROPSYCHOLOGIST

## 2024-06-19 PROCEDURE — 2500000005 HC RX 250 GENERAL PHARMACY W/O HCPCS: Performed by: NEUROLOGICAL SURGERY

## 2024-06-19 PROCEDURE — 82945 GLUCOSE OTHER FLUID: CPT | Performed by: NEUROLOGICAL SURGERY

## 2024-06-19 PROCEDURE — 96132 NRPSYC TST EVAL PHYS/QHP 1ST: CPT | Performed by: CLINICAL NEUROPSYCHOLOGIST

## 2024-06-19 RX ORDER — LIDOCAINE HYDROCHLORIDE 10 MG/ML
5 INJECTION, SOLUTION EPIDURAL; INFILTRATION; INTRACAUDAL; PERINEURAL ONCE
Status: COMPLETED | OUTPATIENT
Start: 2024-06-19 | End: 2024-06-19

## 2024-06-19 RX ORDER — ACETAMINOPHEN 325 MG/1
650 TABLET ORAL ONCE
Status: COMPLETED | OUTPATIENT
Start: 2024-06-19 | End: 2024-06-19

## 2024-06-19 ASSESSMENT — PAIN - FUNCTIONAL ASSESSMENT: PAIN_FUNCTIONAL_ASSESSMENT: 0-10

## 2024-06-19 ASSESSMENT — PAIN DESCRIPTION - LOCATION: LOCATION: HEAD

## 2024-06-19 ASSESSMENT — PAIN SCALES - GENERAL
PAINLEVEL_OUTOF10: 0 - NO PAIN
PAINLEVEL_OUTOF10: 9

## 2024-06-19 NOTE — POST-PROCEDURE NOTE
Fluoroscopic Guided Lumbar Puncture Postprocedure Note    Attending: Dr. Errol Kelley    Resident: Dr. London Delgado    Diagnosis:   1. NPH (normal pressure hydrocephalus) (Multi)  FL guided lumbar puncture    FL guided lumbar puncture    CSF Cell Count With Differential    CSF Cell Count With Differential    CSF Cell Count With Differential    CSF Cell Count With Differential    CSF Culture/Smear    CSF Culture/Smear    TOTAL PROTEIN AND GLUCOSE, CSF    TOTAL PROTEIN AND GLUCOSE, CSF          Description of procedure: Written and verbal informed consent was obtained from the patient. The patient was placed in the prone position on the exam table. A timeout was performed prior to the procedure. Using fluoroscopic guidance, appropriate anatomic landmarks were identified and surgical site was marked. Site was prepped and draped in the usual sterile fashion. Local anesthesia was obtained using approximately 5 mL 1% Lidocaine.  Under intermittent fluoroscopic guidance, a 20 Gauge  3.5 inch spinal needle was advanced into the thecal sac at the L2-L3 until return of cerebral spinal fluid.    Opening pressure was obtained in the decubitus position measuring 9.5 cm H2O.    A total of 40 mL clear cerebral spinal  fluid was collected and submitted to the lab for analysis.    The stylet was replaced and the needle was removed.    The patient tolerated procedure well without any immediate or clinically apparent complications.      The collected CSF was then sent to the lab for appropriate lab tests as ordered by the referring physician.    Estimated Blood Loss: None.    IMPRESSION:   Successful fluoroscopic guided lumbar puncture. See detailed result report with images in PACS.    The patient tolerated the procedure well without incident or complication and is in stable condition.

## 2024-06-20 ENCOUNTER — APPOINTMENT (OUTPATIENT)
Dept: PRIMARY CARE | Facility: CLINIC | Age: 74
End: 2024-06-20
Payer: MEDICARE

## 2024-06-20 NOTE — PROGRESS NOTES
REASON FOR EVALUATION:    NPH Presurgical Evaluation to Rule out Memory Loss    CURRENT COMPLAINTS AND HISTORY:      Ms. Pimentel is a 74-year-old right-handed female who was referred with and progressive gait disturbance beginning roughly 12 years ago in the context of eye/vision concerns (e.g., glaucoma, detached retina) but with progressive worsening despite treatment with cataract surgery and retina repair, urinary changes beginning 2 years ago (increased frequency and urgency; onset of incontinence in the past year), and memory decline beginning 1.5-2 years ago (characterized by word finding difficulty, reduced concentration in conversation, relying on notes/reminders, tangentiality/providing off-target responses), with disproportionate dilatation of the ventricles of the brain on neuroimaging. Ms. Pimentel was seen by Dr. Sanford on 06/03/2024 with report of balance issues since 2007; memory and incontinence were noted for the prior 2 years, and presentation and imaging were noted to be consistent with normal pressure hydrocephalus. Neuropsychological evaluation was requested before and after high-volume lumbar puncture to assist in clarifying the diagnosis and to provide additional information regarding prognosis following possible neurosurgical management.         With the exception of the above noted concerns, Ms. Pimentel and her family denied other comorbid features/signs/symptoms associated with complaints of memory loss, including significant difficulty with sense of direction/getting lost, driving incidents/accidents/violations, leaving oven/stove on or burning food, leaving doors open/unlocked, leaving lights on/water running, vivid/lucid dreams, visual hallucinations, changes in personality, impulse control issues, and vulnerability to deception (e.g., phone, mail, or email schemes).      With respect to instrumental activities of daily living (IADLs), Ms. Pimentel has required assistance to schedule/track medical  appointment (was doing independently prior to moving to [place] in February 2024) and manage finances/pay bills (sister-in-law provides oversight, primarily due to Ms. Pimentel's difficulty using a computer) at least since February 2024. Otherwise, she is independent in basic self-care, medication management, household chores (some limitations due to gait/physical concerns), and driving without reported concerns.       Past medical history was noteworthy for hypertension, hyperlipidemia, and diabetes (type 2). She described a variable sleep pattern (longstanding), stating she may fall asleep around 2-3am and wake up for the day around noon. She reported being diagnosed with moderate-severe sleep apnea in 2010 though has never used a CPAP device or obtained an updated sleep study since that time. No known REM sleep behaviors were reported per her . Ms. Pimentel denied any history of head injury/concussion with loss of consciousness, seizures, or stroke/TIAs.     Psychiatric and psychosocial history was reviewed; Ms. Pimentel reported a history of depression in the context of stress at work and intermittently engaged in psychotherapy at that time. She also reported being trialed on Tofranil in the past but discontinued due to lack of benefit. She is not currently engaged in any mental health treatment. Ms. Pimentel denied clinically significant use of alcohol, tobacco and recreational drugs.    She has been  to her  since February 2024 (together for 13 years prior); she does not have any biological children but has several step-children and step-grandchildren. Ms. Pimentel's family reported several stressors in the past few months, including moving to [place] from [place] in February 2024, living with her  for the first time in addition to her health concerns. Ms. Pimentel denied current/past suicidal or homicidal ideation; auditory/visual hallucinations were also denied.     Family history is noteworthy for memory  loss/dementia (mother, age of onset ~80s). Otherwise, there is no known family history of depression/anxiety/mental health issues, coronary artery disease, cerebrovascular disease, Parkinson's disease/essential tremor/movement disorder, epilepsy/seizures, brain tumor, multiple sclerosis, or other neurological disorder.      With regard to educational and vocational history, Ms. Pimentel completed high school, earning mostly B's & C's with no history of attention difficulties, learning problems, special services, or repeated grades; economics came more easily. 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     RELEVANT LABS/EXAMS:      CT of the brain without contrast on 06/17/2024 was interpreted by the neuroradiologist as showing “…prominence of ventricles and sulci compatible with diffuse parenchymal volume loss. The degree of  ventriculomegaly remains somewhat disproportionate with the size of the sulci… patchy and confluent areas of diminished attenuation in the subcortical and periventricular white matter. Small hypodensities in the basal ganglia and external capsules may represent lacunar infarcts or perivascular spaces…  In the appropriate clinical setting, normal pressure hydrocephalus could give a similar appearance.”  I reviewed the CT personally and by my computation the Samaniego ratio was 0.32, exceeding the radiographic cutoff associated with normal pressure hydrocephalus.      CT of the brain without contrast on 04/23/2024 in the setting of memory impairment, gait disturbance, and incontinence was interpreted by the neuroradiologist as showing “nonspecific white matter changes most likely represent small-vessel ischemic disease in a patient of this age…diffuse parenchymal volume loss. Disproportionate ventriculomegaly could reflect more pronounced central volume loss. In the appropriate  clinical setting, normal pressure hydrocephalus could give this appearance.” I reviewed the CT  personally and by my computation the Samaniego ratio was 0.30 exceeding the radiographic cutoff associated with normal pressure hydrocephalus.      Fluoroscopic guided lumbar puncture (06/19/2024), between pre- and post-LP neuropsychological testing) recorded an opening pressure of 9.5 cm H2O (normal range, 5-20; <24 for NPH) and diverted 40 ml of clear CSF.    MEDICATIONS: As listed in the medical record,     amlodipine besylate (5 mg); Take 1 tablet (5 mg) by mouth once daily.  empagliflozin (25 mg); Take 1 tablet (25 mg) by mouth once daily.  glimepiride (2 mg); Take 1 tablet (2 mg) by mouth once daily in the morning. Take before meals.  losartan potassium (100 mg); Take 1 tablet (100 mg) by mouth once daily.  rosuvastatin calcium (20 mg); Take 1 tablet (20 mg) by mouth once daily.    MENTAL STATUS, BEHAVIOR OBSERVATIONS, AND VALIDITY:      During interview, Ms. Pimentel presented in no acute distress and was well groomed. She was alert and oriented to person, place and time. She ambulated in clinic without assistance; a wheelchair was requested/used for ambulation to farther areas of the hospital. There was no apparent unintentional head or limb movement or facial masking.  She appeared to comprehend spoken language without difficulty. Conversational speech was generally articulate and fluent, with normal volume, rhythm, rate, and intonation; no appreciable language abnormalities were observed. Expressed thoughts were largely logical, but somewhat tangential and off-target responses; she tended to provide extensive and often unrelated details to questions that required interruption or redirection to the original thought/question. She was prone to repeat herself or perseverate on information (e.g., being “forced” into group home, her previous employer/jobs).  During interview, Ms. Pimentel exhibited significant difficulty with short-term memory and demonstrated poor insight, presumably secondary to memory deficits.  Specifically, she had difficulty recalling the onset of specific symptoms and her past medical history, often looking to her  and sister-in-law for assistance. She appeared to retrieve remote personal history without difficulty. No errors in judgment were noted by history. During interview, she appeared euthymic and affect was full and appropriate to content and context. She denied hallucinations, and suicidal/homicidal ideation; there were no apparent delusions.      During testing, rapport was quickly established, and Ms. Pimentel appeared to give good effort on all tasks; an embedded performance validity measure was within normal limits. Ms. Pimentel forgot her reading glasses for the post-LP evaluation (wore them during the pre-LP testing) and noted some difficulty seeing material on a visuomotor scanning/transcription tasks.  These test behaviors were taken into consideration during interpretation.  These results as interpreted are considered reliable and valid.      ASSESSMENT PROCEDURES:      Review of Records; Neurobehavioral Interview with Ms. Pimentel and  (Ronak) and sister-in-law (Abril); Wechsler Adult Intelligence Scale-3rd Ed. (WAIS-III) Digit Span and Digit Symbol; Trail Making Test (TMT); Finger Tapping; Controlled Oral Word Association Test (COWA) Phonemic and Semantic Fluency; Sparrow Verbal Learning Test, Revised (HVLT-R); Timed Gait Test (90' with 180o turn at midpoint; average of 2 trials pre- and post-LP); North American Adult Reading Test (NAART); Adah Naming Test (BNT); Clock Drawing Test; and Geriatric Depression Scale (GDS).  Most tests were administered prior to high-volume LP and again following recovery (using alternate forms); NAART, BNT, Clock Drawing, and GDS were administered only once during the exam; Interactive Face-to-Face Feedback (Impressions, Recommendations, Patient Education); Coordination of care with other health care providers involved in the care plan; Integration,  interpretation, and preparation of final report.      TEST RESULTS:      Pre-LP:      On formal gait testing, Ms. Pimentel was able to rise from the chair with arms crossed. She ambulated with upright posture and using nothing for support.  Gait was mildly unsteady but generally fluent with limited but symmetrical arm swing, normal stance, short stride, reduced floor clearance, and no outward foot rotation, freezing, or drift.  Average time-to-rise was 1.85 seconds, and average velocity was 41.05 seconds with an average of 76 steps per trial (+ 6 steps for the turn).      Premorbid intellectual ability was estimated to be in the average - high average range based on educational history and based on current testing that helps to estimate prior levels of ability.      On formal neuropsychological testing, Ms. Pimentel scored below expectation on measures of fine motor speed (Finger Tapping, mildly impaired bilaterally, R = L), visuomotor scanning/sequencing (TMT-A; moderately impaired), complex processing speed (TMT-B; low average-borderline), Phonemic Fluency (COWA, low average-borderline), and verbal learning/memory (HVLT-R Sum of Learning Trials, low average-average; HVLT-R Trial 4/Long-Delay Free Recall, low average-average), without benefit from recognition cues (HVLT-R Recognition, low average).      Ms. Pimentel scored within normal limits on measures of concentration (Digit Span, low average-average), visuomotor attention/speed (Digit Symbol, low average), and Semantic Fluency (COWA, average).     Post-LP:      Following the LP, Ms. Pimentel described feeling “lighter” and less dizzy (her family said that she always gets dizzy when she turns and that this is a significant change). The family denied any notable changes other than some subtle confusion after waking from the procedure/recovery (i.e., called her sister-in-law her daughter-in-law).     As a reference for interpreting objective changes on standardized tests  reported below, multiple studies have shown that improvement by >10% in either walking or cognitive performance following a high-volume lumbar puncture is supportive of a diagnosis of NPH and predicts favorable response to shunt.    On gait testing, time-to-rise improved by 30%, gait speed improved by 19%, and stride length increased by 18%, and the turn required 25% fewer steps.  Qualitatively, post-LP gross gait features were characterized by increased confidence, faster velocity, longer stride length, and greater ease/speed of turning.     On formal neuropsychological testing, processing speed improved on 2 of 5 measures that were deficient on pre-LP:  Finger Tapping Right and Left Hand, +48% for each hand. While memory performance was still notable for retrieval inefficiencies (HVLT-R Sum of Learning Trials, low average-borderline; HVLT-R Trial 4/Long-Delay Free Recall, low average-average), she showed improvement using a recognition format (HVLT-R Recognition, +1.7 standard deviations vs. Trial 4/Delay Free Recall which was a 33% improvement compared to pre-LP Recognition. was no appreciable improvement on any other measures (Digit Span, TMT-A), and some measures showed slight decline (Digit Symbol; TMT B, COWA).     Confrontational naming (BNT) was in the average range for people at this age with 12 years of education.  Construction ability (Clock Drawing Test) was intact. Self-report ratings on a depression screening (GDS) were mildly elevated.     CONCLUSIONS AND DIAGNOSTIC IMPRESSIONS:    Ms. Pimentel presented with a history of progressive gait disturbance, urinary frequency/urgency/incontinence, and cognitive decline, together with disproportionate ventricular dilatation on neuroimaging and a neuropsychological profile characteristic of subcortical conditions like NPH (slowed processing speed, memory retrieval inefficiencies, and sparing of language and visual-spatial skills).      As a reference for  interpreting objective changes on standardized tests reported below, multiple studies have shown that improvement by >10% in either walking or cognitive performance following a high-volume lumbar puncture is supportive of a diagnosis of NPH and predicts favorable response to shunt.    Following high-volume lumbar puncture, there were some qualitative improvements (e.g., feeling lighter, less dizzy when standing/turning) and significant improvements on objective neuropsychological measures of all 4 gait metrics (+18 to +30%), fine motor speed bilaterally (+48% on each hand), and memory (+33%). Taken together, the constellation and progression of symptoms, imaging, and neuropsychological results are consistent with NPH; therefore, permanent CSF diversion (e.g., shunt) might prevent further deterioration, and improvement observed following LP suggests that at least some of the gait and cognitive changes could be improved.      At present, language (confrontational naming and semantic fluency) and construction skills appear to be preserved, and the memory profile is subcortical in nature; this makes it unlikely that Alzheimer's disease is contributing to the current memory changes. The absence of various comorbid signs and features helps to rule out other neurodegenerative processes such as Parkinson's disease, diffuse Lewy body disease and frontotemporal degenerative disease.    These findings are consistent with diagnoses of memory deficit (ICD R41.3) and psychomotor slowing/deficits (ICD R41.843), together with gait disturbance (ICD R26.9), urinary frequency (ICD R35.0), urinary urgency (ICD R39.15), urinary incontinence (ICD R32), and cerebral ventriculomegaly (ICD G93.89) attributed to normal pressure hydrocephalus (ICD G91.2).    RECOMMENDATIONS:    Ms. Pimentel was advised to follow up with Dr. Sanford in Neurosurgery to discuss these results in the context of the rest of the neurosurgical work-up to determine whether  a shunt would be appropriate and to review potential risks and benefits associated with that procedure.      If Ms. Pimentel proceeds to surgery, physical therapy after surgery would be advisable to help build strength and balance after the contribution of NPH is better managed.      If Ms. Pimentel proceeds to surgery, neuropsychological evaluation is recommended 3-4 months afterward to assess responsiveness of symptoms to shunt and to characterize any needs at that time.  Please schedule this with the Neuropsychology  ([phone]) when the surgery date is set.    Activities and Lifestyle Changes to Maintain Brain Health and Cognitive Functioning:    The following activities and interventions are recommended for optimizing brain health and preserving cognitive function:  the “MIND diet” for heart and brain health;  good stress management (e.g., relaxation techniques);  management of any vascular risks (e.g., diabetes, hypertension, cholesterol);  30-60 minutes of daily aerobic exercise (e.g., walking, swimming);  social engagement (e.g., getting together with other people regularly); and   cognitively stimulating activities (e.g., playing cards, board games, computer games; taking classes, joining study/discussion groups, pursuing a new hobby; completing crossword, Sudoku, word-search and other puzzles; reading books about new topics, cultures, or geographical areas).      Restorative sleep (7-9 hours for adults) contributes to good physical and brain health and is critical to daytime alertness and safety, cognitive functioning, school/work performance, mood, and interpersonal relationships. The following behavioral strategies and environmental modifications can improve onset, duration and quality of sleep:  Set a fixed bedtime and waking time every day.  Avoid napping during the day.   Avoid alcohol, caffeine and heavy, spicy, or sugary foods 4-6 hours before bedtime.   Exercise regularly, but not right before  going to bed.  Block out all distractions by turning off - or even removing from the room - electronic devices such as TV, computer, phone, video player, audio player, pina device, etc.  Reading for pleasure is an excellent substitute for activities on electronic devices for the purpose of improving sleep onset.  Use comfortable bedding, set a comfortable temperature, and keep the room well ventilated.  Do not use the bed as an office, workroom, or recreation room.   Establish a pre-sleep ritual, such as a warm bath or a few minutes of reading.  If prone to anxiety, relaxation techniques such as yoga and deep breathing can be helpful.     Given Ms. Pimentel's reported history of sleep apnea, she might benefit from consultation with a sleep specialist to assess possible sleep apnea and appropriate treatments. Dr. Gordy España ([facility], [phone]) and Janey Luu ([facility], [phone]) are both board-certified in Adult Sleep Neurology and could be helpful in this regard.      Good hydration is important to optimal cognitive functioning and has many other health benefits as well (e.g., increased energy, better mood, and prevention/reduction of headache, dizziness, and other health symptoms/problems).  Guidelines vary depending on multiple factors, but several general principles are consistently endorsed:  At a minimum, drink fluids whenever you feel thirsty; plain water is the best source of fluid intake; and limit caffeine and alcohol, which reduce fluid in the body.  Your primary care provider can provide specific guidelines for you personally.    If there are persisting concerns for memory decline after surgical intervention for NPH, we would be happy to arrange further evaluation at that time.    We reviewed and discussed results, impressions, and recommendations with Ms. Pimentel and her family immediately following the exam.  We provided patient education regarding the mechanisms,  symptoms, and course of normal pressure hydrocephalus and the role of neuropsychological evaluation in presurgical evaluation and postsurgical management and answered all questions to their satisfaction at that time.  Although we did not have time in clinic to discuss a brain-healthy lifestyle, the recommendations above include evidence-based activities and health behaviors to promote brain health and to preserve cognitive functioning as we get older.    Thank you for the opportunity to participate in Ms. Pimentel's evaluation and care.  If I can provide any additional assistance, please do not hesitate to contact me at [phone].    Sincerely,    Monique Zafar PsyD  Postdoctoral Fellow in Clinical Neuropsychology        Александр Callejas, PhD  Clinical Neuropsychologist  Professor of Neurology, [employer]  Former Director of Clinical Neuropsychology, [facility]  Fellow of the National Academy of Neuropsychology  Fellow of the American Psychological Association  Fellow of the Sports Neuropsychology Society  Fellow of the American Epilepsy Society    ICD R41.3; R41.843; G31.84; R26.9; R35.0; R39.15; R32; G93.89; G91.2  77266=5; 89734=1; 12859=5; 24132=1; 63596=0     Orig: NABOR Sanford MD, Neurological Surgery, [facility]   cc:       Radha Calderon MD, Family Medicine, [facility]   cc: Ms. Pimentel (Patient Clinical Summary)  cc: Neuropsychology File    Attending Provider's Attestation:  I saw and evaluated the patient. I personally obtained the key and critical portions of the neuropsychological exam or was physically present for key and critical portions performed by the fellow. I consulted with the fellow, reviewed/revised all documentation, and agree with the diagnostic impressions and neuropsychological findings.

## 2024-06-22 LAB
BACTERIA CSF CULT: NORMAL
GRAM STN SPEC: NORMAL
GRAM STN SPEC: NORMAL

## 2024-06-24 ENCOUNTER — APPOINTMENT (OUTPATIENT)
Dept: NEUROSURGERY | Facility: CLINIC | Age: 74
End: 2024-06-24
Payer: MEDICARE

## 2024-06-26 LAB
BACTERIA CSF CULT: NORMAL
GRAM STN SPEC: NORMAL
GRAM STN SPEC: NORMAL

## 2024-06-27 PROBLEM — R41.3 MEMORY DIFFICULTY: Status: ACTIVE | Noted: 2024-06-27

## 2024-06-27 PROBLEM — G93.89 CEREBRAL VENTRICULOMEGALY: Status: ACTIVE | Noted: 2024-06-27

## 2024-06-27 PROBLEM — R26.9 GAIT DISTURBANCE: Status: ACTIVE | Noted: 2024-06-27

## 2024-06-27 PROBLEM — G91.2 NPH (NORMAL PRESSURE HYDROCEPHALUS) (MULTI): Status: ACTIVE | Noted: 2024-06-27

## 2024-06-27 PROBLEM — R35.0 URINARY FREQUENCY: Status: ACTIVE | Noted: 2024-06-27

## 2024-06-27 PROBLEM — R41.843 PSYCHOMOTOR DEFICIT: Status: ACTIVE | Noted: 2024-06-27

## 2024-06-27 PROBLEM — R39.15 URINARY URGENCY: Status: ACTIVE | Noted: 2024-06-27

## 2024-07-03 ENCOUNTER — TELEPHONE (OUTPATIENT)
Dept: PRIMARY CARE | Facility: CLINIC | Age: 74
End: 2024-07-03
Payer: MEDICARE

## 2024-07-03 DIAGNOSIS — Z12.31 ENCOUNTER FOR SCREENING MAMMOGRAM FOR MALIGNANT NEOPLASM OF BREAST: ICD-10-CM

## 2024-07-03 DIAGNOSIS — Z12.39 BREAST SCREENING: Primary | ICD-10-CM

## 2024-07-03 NOTE — TELEPHONE ENCOUNTER
Patient called stating that she got notification that she is past due for her mammogram. She is asking if you would be willing to put an order for her?

## 2024-07-08 ENCOUNTER — APPOINTMENT (OUTPATIENT)
Dept: NEUROSURGERY | Facility: CLINIC | Age: 74
End: 2024-07-08
Payer: MEDICARE

## 2024-07-08 ENCOUNTER — PREP FOR PROCEDURE (OUTPATIENT)
Dept: NEUROSURGERY | Facility: HOSPITAL | Age: 74
End: 2024-07-08

## 2024-07-08 VITALS — RESPIRATION RATE: 18 BRPM | HEART RATE: 72 BPM | SYSTOLIC BLOOD PRESSURE: 150 MMHG | DIASTOLIC BLOOD PRESSURE: 82 MMHG

## 2024-07-08 DIAGNOSIS — G91.2 NORMAL PRESSURE HYDROCEPHALUS (MULTI): Primary | ICD-10-CM

## 2024-07-08 DIAGNOSIS — G91.2 NPH (NORMAL PRESSURE HYDROCEPHALUS) (MULTI): Primary | ICD-10-CM

## 2024-07-08 PROCEDURE — 1036F TOBACCO NON-USER: CPT | Performed by: NEUROLOGICAL SURGERY

## 2024-07-08 PROCEDURE — 99212 OFFICE O/P EST SF 10 MIN: CPT | Performed by: NEUROLOGICAL SURGERY

## 2024-07-08 PROCEDURE — 4010F ACE/ARB THERAPY RXD/TAKEN: CPT | Performed by: NEUROLOGICAL SURGERY

## 2024-07-08 PROCEDURE — 3052F HG A1C>EQUAL 8.0%<EQUAL 9.0%: CPT | Performed by: NEUROLOGICAL SURGERY

## 2024-07-08 PROCEDURE — 3079F DIAST BP 80-89 MM HG: CPT | Performed by: NEUROLOGICAL SURGERY

## 2024-07-08 PROCEDURE — 3050F LDL-C >= 130 MG/DL: CPT | Performed by: NEUROLOGICAL SURGERY

## 2024-07-08 PROCEDURE — 3077F SYST BP >= 140 MM HG: CPT | Performed by: NEUROLOGICAL SURGERY

## 2024-07-08 PROCEDURE — 1125F AMNT PAIN NOTED PAIN PRSNT: CPT | Performed by: NEUROLOGICAL SURGERY

## 2024-07-08 ASSESSMENT — ENCOUNTER SYMPTOMS
OCCASIONAL FEELINGS OF UNSTEADINESS: 0
DEPRESSION: 0
LOSS OF SENSATION IN FEET: 0

## 2024-07-08 ASSESSMENT — PATIENT HEALTH QUESTIONNAIRE - PHQ9
2. FEELING DOWN, DEPRESSED OR HOPELESS: NOT AT ALL
1. LITTLE INTEREST OR PLEASURE IN DOING THINGS: NOT AT ALL
SUM OF ALL RESPONSES TO PHQ9 QUESTIONS 1 AND 2: 0

## 2024-07-08 ASSESSMENT — COLUMBIA-SUICIDE SEVERITY RATING SCALE - C-SSRS
2. HAVE YOU ACTUALLY HAD ANY THOUGHTS OF KILLING YOURSELF?: NO
6. HAVE YOU EVER DONE ANYTHING, STARTED TO DO ANYTHING, OR PREPARED TO DO ANYTHING TO END YOUR LIFE?: NO
1. IN THE PAST MONTH, HAVE YOU WISHED YOU WERE DEAD OR WISHED YOU COULD GO TO SLEEP AND NOT WAKE UP?: NO

## 2024-07-08 ASSESSMENT — PAIN SCALES - GENERAL: PAINLEVEL: 4

## 2024-07-08 NOTE — PROGRESS NOTES
74 yr old female that saw Dr. Callejas and had LP on 6/19/2024.     74-year-old woman returns for follow-up after going lumbar puncture testing for normal pressure hydrocephalus.  The patient feels that her urinary incontinence and her balance and mobility were slightly improved following the testing.  Her family also noted some improvements.    On exam, the patient is alert and interactive.  Her language comprehension production are intact though she has some difficulty following the conversation and participating appropriately.  Extraocular movements are intact.  Face moves symmetrically.  She moves all extremities symmetrically.    Following lumbar puncture the patient demonstrated several objective improvement in cognition and psychomotor function.    The patient's clinical presentation, imaging, and response to lumbar puncture are indicative of normal pressure hydrocephalus.  To address this, I have offered the patient surgery for placement of a ventriculoperitoneal shunt for CSF diversion.  We reviewed the risks and benefits of this today.  Specifically, we discussed the possible to predict how much of the patient's symptoms are from the hydrocephalus versus other age-related degeneration.  The patient wishes to proceed with surgery.

## 2024-07-11 PROBLEM — G91.2 NORMAL PRESSURE HYDROCEPHALUS (MULTI): Status: ACTIVE | Noted: 2024-07-08

## 2024-07-15 DIAGNOSIS — G91.2 NPH (NORMAL PRESSURE HYDROCEPHALUS) (MULTI): ICD-10-CM

## 2024-07-19 ENCOUNTER — PRE-ADMISSION TESTING (OUTPATIENT)
Dept: PREADMISSION TESTING | Facility: HOSPITAL | Age: 74
End: 2024-07-19
Payer: MEDICARE

## 2024-07-19 VITALS
RESPIRATION RATE: 18 BRPM | WEIGHT: 171 LBS | DIASTOLIC BLOOD PRESSURE: 78 MMHG | SYSTOLIC BLOOD PRESSURE: 143 MMHG | HEIGHT: 57 IN | BODY MASS INDEX: 36.89 KG/M2 | HEART RATE: 102 BPM | TEMPERATURE: 98.2 F

## 2024-07-19 DIAGNOSIS — E11.9 TYPE 2 DIABETES MELLITUS WITHOUT COMPLICATION, WITHOUT LONG-TERM CURRENT USE OF INSULIN (MULTI): Primary | ICD-10-CM

## 2024-07-19 DIAGNOSIS — G91.2 NPH (NORMAL PRESSURE HYDROCEPHALUS) (MULTI): ICD-10-CM

## 2024-07-19 LAB
ABO GROUP (TYPE) IN BLOOD: NORMAL
ANION GAP SERPL CALC-SCNC: 15 MMOL/L (ref 10–20)
ANTIBODY SCREEN: NORMAL
BUN SERPL-MCNC: 15 MG/DL (ref 6–23)
CALCIUM SERPL-MCNC: 9.7 MG/DL (ref 8.6–10.6)
CHLORIDE SERPL-SCNC: 102 MMOL/L (ref 98–107)
CO2 SERPL-SCNC: 28 MMOL/L (ref 21–32)
CREAT SERPL-MCNC: 0.66 MG/DL (ref 0.5–1.05)
EGFRCR SERPLBLD CKD-EPI 2021: >90 ML/MIN/1.73M*2
ERYTHROCYTE [DISTWIDTH] IN BLOOD BY AUTOMATED COUNT: 13.1 % (ref 11.5–14.5)
GLUCOSE SERPL-MCNC: 232 MG/DL (ref 74–99)
HCT VFR BLD AUTO: 41.3 % (ref 36–46)
HGB BLD-MCNC: 13.4 G/DL (ref 12–16)
MCH RBC QN AUTO: 29.9 PG (ref 26–34)
MCHC RBC AUTO-ENTMCNC: 32.4 G/DL (ref 32–36)
MCV RBC AUTO: 92 FL (ref 80–100)
NRBC BLD-RTO: 0 /100 WBCS (ref 0–0)
PLATELET # BLD AUTO: 228 X10*3/UL (ref 150–450)
POTASSIUM SERPL-SCNC: 4.7 MMOL/L (ref 3.5–5.3)
RBC # BLD AUTO: 4.48 X10*6/UL (ref 4–5.2)
RH FACTOR (ANTIGEN D): NORMAL
SODIUM SERPL-SCNC: 140 MMOL/L (ref 136–145)
WBC # BLD AUTO: 7.2 X10*3/UL (ref 4.4–11.3)

## 2024-07-19 PROCEDURE — 82374 ASSAY BLOOD CARBON DIOXIDE: CPT

## 2024-07-19 PROCEDURE — 86901 BLOOD TYPING SEROLOGIC RH(D): CPT

## 2024-07-19 PROCEDURE — 85027 COMPLETE CBC AUTOMATED: CPT

## 2024-07-19 PROCEDURE — 87081 CULTURE SCREEN ONLY: CPT

## 2024-07-19 PROCEDURE — 99205 OFFICE O/P NEW HI 60 MIN: CPT | Performed by: NURSE PRACTITIONER

## 2024-07-19 PROCEDURE — 36415 COLL VENOUS BLD VENIPUNCTURE: CPT

## 2024-07-19 PROCEDURE — 87086 URINE CULTURE/COLONY COUNT: CPT

## 2024-07-19 PROCEDURE — 81001 URINALYSIS AUTO W/SCOPE: CPT

## 2024-07-19 PROCEDURE — 83036 HEMOGLOBIN GLYCOSYLATED A1C: CPT

## 2024-07-19 RX ORDER — CHLORHEXIDINE GLUCONATE ORAL RINSE 1.2 MG/ML
15 SOLUTION DENTAL AS NEEDED
Qty: 473 ML | Refills: 0 | Status: SHIPPED | OUTPATIENT
Start: 2024-07-19 | End: 2024-07-21

## 2024-07-19 RX ORDER — CHLORHEXIDINE GLUCONATE 40 MG/ML
SOLUTION TOPICAL DAILY PRN
Qty: 473 ML | Refills: 0 | Status: SHIPPED | OUTPATIENT
Start: 2024-07-19 | End: 2024-07-24

## 2024-07-19 ASSESSMENT — ENCOUNTER SYMPTOMS
VISUAL CHANGE: 1
DIARRHEA: 1
BRUISES/BLEEDS EASILY: 1
RESPIRATORY NEGATIVE: 1
NERVOUS/ANXIOUS: 1
SINUS CONGESTION: 1
CARDIOVASCULAR NEGATIVE: 1
NECK NEGATIVE: 1
ENDOCRINE NEGATIVE: 1
CONSTITUTIONAL NEGATIVE: 1
MYALGIAS: 1
CONSTIPATION: 1
LIGHT-HEADEDNESS: 1
ARTHRALGIAS: 1

## 2024-07-19 ASSESSMENT — DUKE ACTIVITY SCORE INDEX (DASI)
CAN YOU HAVE SEXUAL RELATIONS: NO
TOTAL_SCORE: 13.45
CAN YOU PARTICIPATE IN STRENOUS SPORTS LIKE SWIMMING, SINGLES TENNIS, FOOTBALL, BASKETBALL, OR SKIING: NO
CAN YOU DO HEAVY WORK AROUND THE HOUSE LIKE SCRUBBING FLOORS OR LIFTING AND MOVING HEAVY FURNITURE: NO
CAN YOU DO LIGHT WORK AROUND THE HOUSE LIKE DUSTING OR WASHING DISHES: YES
CAN YOU PARTICIPATE IN MODERATE RECREATIONAL ACTIVITIES LIKE GOLF, BOWLING, DANCING, DOUBLES TENNIS OR THROWING A BASEBALL OR FOOTBALL: NO
DASI METS SCORE: 4.4
CAN YOU WALK INDOORS, SUCH AS AROUND YOUR HOUSE: YES
CAN YOU TAKE CARE OF YOURSELF (EAT, DRESS, BATHE, OR USE TOILET): YES
CAN YOU RUN A SHORT DISTANCE: NO
CAN YOU DO YARD WORK LIKE RAKING LEAVES, WEEDING OR PUSHING A MOWER: NO
CAN YOU CLIMB A FLIGHT OF STAIRS OR WALK UP A HILL: NO
CAN YOU DO MODERATE WORK AROUND THE HOUSE LIKE VACUUMING, SWEEPING FLOORS OR CARRYING GROCERIES: YES
CAN YOU WALK A BLOCK OR TWO ON LEVEL GROUND: YES

## 2024-07-19 ASSESSMENT — LIFESTYLE VARIABLES: SMOKING_STATUS: NONSMOKER

## 2024-07-19 NOTE — CPM/PAT H&P
CPM/PAT Evaluation       Name: Vee Pimentel (Vee Pimentel)  /Age: 1950/74 y.o.     Visit Type:   In-Person       Chief Complaint: Normal pressure hydrocephalus (Multi)    HPI  Pt is a 74 year old female witj a PMHx significant for HTN, HLD, MARISELA noncompliant with CPAP, cataracts s/p repair, glaucoma, obesity, DM type II, anxiety, depression, osteoarthritis, urinary incontinence and NPH. Pt is being evaluated in [facility] in anticipation of Creation of Right Ventricular Peritoneal Shunt with Dr. Sanford on 24.  Past Medical History:   Diagnosis Date    Anxiety     Arthritis     Basal cell carcinoma of leg, left     Cataract     Depression     Glaucoma     Hyperlipidemia     Hypertension     NPH (normal pressure hydrocephalus) (Multi)     Obesity     Sleep apnea     Type 2 diabetes mellitus (Multi)     Urinary incontinence     Urinary retention     Vision loss     left eye fibrosis       Past Surgical History:   Procedure Laterality Date    APPENDECTOMY      HERNIA REPAIR      LUMBAR PUNCTURE      TONSILLECTOMY         Patient Sexual activity questions deferred to the physician.    Family History   Problem Relation Name Age of Onset    Dementia Mother      Heart failure Mother      Fibromyalgia Mother      Other (ABDOMINAL ANER) Father         Allergies   Allergen Reactions    Azithromycin Angioedema    Metformin GI Upset    Penicillins Other    Amoxicillin Rash       Prior to Admission medications    Medication Sig Start Date End Date Taking? Authorizing Provider   amLODIPine (Norvasc) 5 mg tablet Take 1 tablet (5 mg) by mouth once daily. 24  Yes Radha Calderon MD   glimepiride (Amaryl) 2 mg tablet Take 1 tablet (2 mg) by mouth once daily in the morning. Take before meals. 24  Yes Radha Calderon MD   losartan (Cozaar) 100 mg tablet Take 1 tablet (100 mg) by mouth once daily. 24  Yes Radha Calderon MD   rosuvastatin (Crestor) 20 mg tablet Take 1 tablet (20 mg) by mouth once daily. 24 Yes Radha Calderon MD    empagliflozin (Jardiance) 25 mg Take 1 tablet (25 mg) by mouth once daily.  Patient not taking: Reported on 7/19/2024 5/9/24 8/7/24  Radha Calderon MD        PAT ROS:   Constitutional:   neg    Neuro/Psych:    light-headedness   nervous/anxious  Eyes:    vision loss   visual disturbance   use of corrective lenses  Ears:    tinnitus  Nose:    sinus congestion  Mouth:   neg    Throat:   neg    Neck:   neg    Cardio:   neg    Respiratory:   neg    Endocrine:   neg    GI:    constipation   diarrhea  :    incontinence  Musculoskeletal:    arthralgias   myalgias  Hematologic:    bruises/bleeds easily  Skin:  neg        Physical Exam  Vitals reviewed.   Constitutional:       Appearance: Normal appearance.   HENT:      Head: Normocephalic and atraumatic.      Nose: Congestion present.      Mouth/Throat:      Mouth: Mucous membranes are moist.   Cardiovascular:      Rate and Rhythm: Tachycardia present.      Pulses: Normal pulses.      Heart sounds: Normal heart sounds.   Pulmonary:      Effort: Pulmonary effort is normal.      Breath sounds: Normal breath sounds.   Abdominal:      Palpations: Abdomen is soft.   Musculoskeletal:         General: Normal range of motion.      Cervical back: Normal range of motion.   Skin:     General: Skin is warm.   Neurological:      General: No focal deficit present.      Mental Status: She is alert and oriented to person, place, and time.      Gait: Gait abnormal.   Psychiatric:         Mood and Affect: Mood normal.         Behavior: Behavior normal.          PAT AIRWAY:   Airway:     Mallampati::  II    TM distance::  >3 FB    Neck ROM::  Full  normal        Visit Vitals  /78   Pulse 102   Temp 36.8 °C (98.2 °F)   Resp 18       DASI Risk Score      Flowsheet Row Most Recent Value   DASI SCORE 13.45   METS Score (Will be calculated only when all the questions are answered) 4.4          Caprini DVT Assessment      Flowsheet Row Most Recent Value   DVT Score 11   Current Status Major  surgery planned, lasting over 3 hours   History Prior major surgery   Age 60-75 years   BMI 31-40 (Obesity)          Modified Frailty Index      Flowsheet Row Most Recent Value   Modified Frailty Index Calculator .1818          CHADS2 Stroke Risk  Current as of 17 minutes ago        N/A 3 to 100%: High Risk   2 to < 3%: Medium Risk   0 to < 2%: Low Risk     Last Change: N/A          This score determines the patient's risk of having a stroke if the patient has atrial fibrillation.        This score is not applicable to this patient. Components are not calculated.          Revised Cardiac Risk Index      Flowsheet Row Most Recent Value   Revised Cardiac Risk Calculator 0          Apfel Simplified Score      Flowsheet Row Most Recent Value   Apfel Simplified Score Calculator 3          Risk Analysis Index Results This Encounter    No data found in the last 1 encounters.       Stop Bang Score      Flowsheet Row Most Recent Value   Do you snore loudly? 1   Do you often feel tired or fatigued after your sleep? 1   Has anyone ever observed you stop breathing in your sleep? 1   Do you have or are you being treated for high blood pressure? 1   Recent BMI (Calculated) 37   Is BMI greater than 35 kg/m2? 1=Yes   Age older than 50 years old? 1=Yes   Is your neck circumference greater than 17 inches (Male) or 16 inches (Female)? 0   Gender - Male 0=No   STOP-BANG Total Score 6            Assessment and Plan:     Anesthesia:  The patient denies problems with anesthesia in the past such as PONV, prolonged sedation, awareness, dental damage, aspiration, cardiac arrest, difficult intubation, or unexpected hospital admissions.     Neuro:   The patient has diagnoses or significant findings on chart review or clinical presentation and evaluation significant for NPH. The patient is at increased risk for postoperative delirium secondary to age 65 or older. The patient is at increased risk for perioperative stroke secondary to  hypertension , increased age, hyperlipidemia, female gender, diabetes mellitus, general anesthesia, operative time >2.5 hours.    HEENT/Airway  No diagnoses, significant findings on chart review, clinical presentation, or evaluation.    Cardiovascular  The patient is scheduled for non-cardiac surgery associated with elevated risk. The patient has no major cardiac contraindications to non- cardiac surgery.  RCRI  The patient meets 0-1 RCRI criteria and therefore has a less than 1% risk of major adverse cardiac complications.  METS  The patient's functional capacity capacity is greater than 4 METS.  EKG  The patient has no EKG or echocardiographic changes concerning for myocardial ischemia.   Heart Failure  The patient has no known history of heart failure.  Additionally, the patient reports no symptoms of heart failure and demonstrates no signs of heart failure.  Hypertension Evaluation  The patient has a known history of hypertension that is controlled.  Patient's hypertension is most consistent with stage 1.  Heart Rhythm Evaluation  The patient has no history of arrhythmias.  Heart Valve Evaluation  The patient has a known history of valvular heart disease.  Per patient's prior studies, the patient has mild AVR, MVR and TVR  Cardiology Evaluation  The patient is not followed by cardiology.    The patient has a 30-day risk for MACE of 1 predictor, 6.0% risk for cardiac death, nonfatal myocardial infarction, and nonfatal cardiac arrest.  PITA score which indicates a 0.6% risk of intraoperative or 30-day postoperative MACE (major adverse cardiac event).    Pulmonary   The patient has findings on chart review, clinical presentation and evaluation significant for MARISELA. The patient is at increased risk of perioperative pulmonary complications secondary to neurosurgery, MARISELA, advanced age greater than 60, morbid obesity.    The patient has a stop bang score of 6, which places patient at high risk for having MARISELA.    ARISCAT  26, Intermediate, 13.3% risk of in-hospital postoperative pulmonary complications  PRODIGY 17, high risk of respiratory depression episode. Patient given PI sheet for preoperative deep breathing exercises.    Hematology  No diagnoses or significant findings on chart review or clinical presentation and evaluation.  Antiplatelet management   The patient is not currently receiving antiplatelet therapy.  Anticoagulation management  The patient is not currently receiving anticoagulation therapy.    Caprini score 11, high risk of perioperative VTE.     Patient instructed to ambulate as soon as possible postoperatively to decrease thromboembolic risk. Initiate mechanical DVT prophylaxis as soon as possible and initiate chemical prophylaxis when deemed safe from a bleeding standpoint post surgery.     Transfusion Evaluation  A type and screen was obtained given the likelihood for perioperative transfusion of blood or blood products.    Gastrointestinal  No diagnoses or significant findings on chart review or clinical presentation and evaluation.    Eat 10- 0,  self-perceived oropharyngeal dysphagia scale (0-40)     Genitourinary  The patient has diagnoses or significant findings on chart review or clinical presentation and evaluation significant for urinary incontinence    Renal  No renal diagnoses or significant findings on chart review or clinical presentation and evaluation. The patient has specific risk factors associated with increased risk of perioperative renal complications related to age greater than 55, hypertension, diabetes mellitus.    Musculoskeletal  The patient has diagnoses or significant findings on chart review or clinical presentation and evaluation significant for osteoarthritis    Endocrine  Diabetes Evaluation  The patient has a history of diabetes mellitus that currently appears controlled.  Thyroid Disease Evaluation  The patient has no history of thyroid disease.    ID  No diagnoses or significant  findings on chart review or clinical presentation and evaluation. MRSA screening obtained. Prescriptions and instructions given for Hibiclens and Peridex.    -Preoperative medication instructions were provided and reviewed with the patient.  Any additional testing or evaluation was explained to the patient.  NPO Instructions were discussed, and the patient's questions were answered prior to conclusion of this encounter.     Recent Results (from the past 168 hour(s))   Staphylococcus aureus/MRSA colonization, Culture    Collection Time: 07/19/24  8:26 AM    Specimen: Nares/Axilla/Groin; Swab   Result Value Ref Range    Staph/MRSA Screen Culture No Staphylococcus aureus isolated    CBC    Collection Time: 07/19/24  8:26 AM   Result Value Ref Range    WBC 7.2 4.4 - 11.3 x10*3/uL    nRBC 0.0 0.0 - 0.0 /100 WBCs    RBC 4.48 4.00 - 5.20 x10*6/uL    Hemoglobin 13.4 12.0 - 16.0 g/dL    Hematocrit 41.3 36.0 - 46.0 %    MCV 92 80 - 100 fL    MCH 29.9 26.0 - 34.0 pg    MCHC 32.4 32.0 - 36.0 g/dL    RDW 13.1 11.5 - 14.5 %    Platelets 228 150 - 450 x10*3/uL   Type And Screen    Collection Time: 07/19/24  8:26 AM   Result Value Ref Range    ABO TYPE B     Rh TYPE POS     ANTIBODY SCREEN NEG    Basic Metabolic Panel    Collection Time: 07/19/24  8:26 AM   Result Value Ref Range    Glucose 232 (H) 74 - 99 mg/dL    Sodium 140 136 - 145 mmol/L    Potassium 4.7 3.5 - 5.3 mmol/L    Chloride 102 98 - 107 mmol/L    Bicarbonate 28 21 - 32 mmol/L    Anion Gap 15 10 - 20 mmol/L    Urea Nitrogen 15 6 - 23 mg/dL    Creatinine 0.66 0.50 - 1.05 mg/dL    eGFR >90 >60 mL/min/1.73m*2    Calcium 9.7 8.6 - 10.6 mg/dL   Urinalysis with Reflex Culture and Microscopic    Collection Time: 07/19/24  8:26 AM   Result Value Ref Range    Color, Urine Yellow Light-Yellow, Yellow, Dark-Yellow    Appearance, Urine Clear Clear    Specific Gravity, Urine 1.024 1.005 - 1.035    pH, Urine 5.5 5.0, 5.5, 6.0, 6.5, 7.0, 7.5, 8.0    Protein, Urine NEGATIVE NEGATIVE,  10 (TRACE), 20 (TRACE) mg/dL    Glucose, Urine 100 (1+) (A) Normal mg/dL    Blood, Urine NEGATIVE NEGATIVE    Ketones, Urine TRACE (A) NEGATIVE mg/dL    Bilirubin, Urine NEGATIVE NEGATIVE    Urobilinogen, Urine Normal Normal mg/dL    Nitrite, Urine NEGATIVE NEGATIVE    Leukocyte Esterase, Urine 25 Janis/µL (A) NEGATIVE   Extra Urine Gray Tube    Collection Time: 07/19/24  8:26 AM   Result Value Ref Range    Extra Tube Hold for add-ons.    Microscopic Only, Urine    Collection Time: 07/19/24  8:26 AM   Result Value Ref Range    WBC, Urine 6-10 (A) 1-5, NONE /HPF    RBC, Urine 1-2 NONE, 1-2, 3-5 /HPF    Mucus, Urine FEW Reference range not established. /LPF    Calcium Oxalate Crystals, Urine 3+ (A) NONE, 1+ /HPF   Urine Culture    Collection Time: 07/19/24  8:26 AM    Specimen: Clean Catch/Voided; Urine   Result Value Ref Range    Urine Culture No significant growth

## 2024-07-19 NOTE — PREPROCEDURE INSTRUCTIONS
Fasting Guidelines    Why must I stop eating and drinking near surgery time?  With sedation, food or liquid in your stomach can enter your lungs causing serious complications  Increases nausea and vomiting    When do I need to stop eating and drinking before my surgery?  Do not eat any food or drink any liquids after midnight the night before your surgery/procedure.  You may have sips of water to take medications.    Additional Instructions:     We have sent a prescription for Hibiclens soap and Peridex mouth wash to your preferred pharmacy.  If you have not already, Please  your prescription and start using as directed before surgery.  Follow the instruction sheet provided to you at your CPM/PAT appointment.    Avoid herbal supplements, multivitamins and NSAIDS (non-steroidal anti-inflammatory drugs) such as Advil, Aleve, Ibuprofen, Naproxen, Excedrin, Meloxicam or Celebrex for at least 7 days prior to surgery. May take Tylenol as needed.    Avoid tobacco and alcohol products for 24 hours prior to surgery.    CONTACT SURGEON'S OFFICE IF YOU DEVELOP:  * Fever = 100.4 F   * New respiratory symptoms (e.g. cough, shortness of breath, respiratory distress, sore throat)  * Recent loss of taste or smell  *Flu like symptoms such as headache, fatigue or gastrointestinal symptoms  * You develop any open sores, shingles, burning or painful urination   AND/OR:  * You no longer wish to have the surgery.  * Any other personal circumstances change that may lead to the need to cancel or defer this surgery.  *You were admitted to any hospital within one week of your planned procedure.    Seven/Six Days before Surgery:  Review your medication instructions, stop indicated medications    Day of Surgery:  Review your medication instructions, take indicated medications  Wear comfortable loose fitting clothing  Do not use moisturizers, creams, lotions or perfume  All jewelry and valuables should be left at home      [facility]  [phone]           Patient Information: Pre-Operative Infection Prevention Measures     Why did I have my nose, under my arms, and groin swabbed?  The purpose of the swab is to identify Staphylococcus aureus inside your nose or on your skin.  The swab was sent to the laboratory for culture.  A positive swab/culture for Staphylococcus aureus is called colonization or carriage.      What is Staphylococcus aureus?  Staphylococcus aureus, also known as “staph”, is a germ found on the skin or in the nose of healthy people.  Sometimes Staphylococcus aureus can get into the body and cause an infection.  This can be minor (such as pimples, boils, or other skin problems).  It might also be serious (such as a blood infection, pneumonia, or a surgical site infection).    What is Staphylococcus aureus colonization or carriage?  Colonization or carriage means that a person has the germ but is not sick from it.  These bacteria can be spread on the hands or when breathing or sneezing.    How is Staphylococcus aureus spread?  It is most often spread by close contact with a person or item that carries it.    What happens if my culture is positive for Staphylococcus aureus?  Your doctor/medical team will use this information to guide any antibiotic treatment which may be necessary.  Regardless of the culture results, we will clean the inside of your nose with a betadine swab just before you have your surgery.      Will I get an infection if I have Staphylococcus aureus in my nose or on my skin?  Anyone can get an infection with Staphylococcus aureus.  However, the best way to reduce your risk of infection is to follow the instructions provided to you for the use of your CHG soap and dental rinse.        Patient Information: Oral/Dental Rinse    What is oral/dental rinse?   It is a mouthwash. It is a way of cleaning the mouth with a germ-killing solution before your surgery.  The solution contains  chlorhexidine, commonly known as CHG.   It is used inside the mouth to kill a bacteria known as Staphylococcus aureus.  Let your doctor know if you are allergic to Chlorhexidine.    Why do I need to use CHG oral/dental rinse?  The CHG oral/dental rinse helps to kill a bacteria in your mouth known as Staphylococcus aureus.     This reduces the risk of infection at the surgical site.      Using your CHG oral/dental rinse  STEPS:  Use your CHG oral/dental rinse after you brush your teeth the night before (at bedtime) and the morning of your surgery.  Follow all directions on your prescription label.    Use the cap on the container to measure 15ml   Swish (gargle if you can) the mouthwash in your mouth for at least 30 seconds, (do not swallow) and spit out  After you use your CHG rinse, do not rinse your mouth with water, drink or eat.  Please refer to the prescription label for the appropriate time to resume oral intake      What side effects might I have using the CHG oral/dental rinse?  CHG rinse will stick to plaque on the teeth.  Brush and floss just before use.  Teeth brushing will help avoid staining of plaque during use.      Patient Information: Home Preoperative Antibacterial Shower      What is a home preoperative antibacterial shower?  This shower is a way of cleaning the skin with a germ-killing solution before surgery.  The solution contains chlorhexidine, commonly known as CHG.  CHG is a skin cleanser with germ-killing ability.  Let your doctor know if you are allergic to chlorhexidine.    Why do I need to take a preoperative antibacterial shower?  Skin is not sterile.  It is best to try to make your skin as free of germs as possible before surgery.  Proper cleansing with a germ-killing soap before surgery can lower the number of germs on your skin.  This helps to reduce the risk of infection at the surgical site.  Following the instructions listed below will help you prepare your skin for surgery.       How do I use the solution?  Steps:  Begin using your CHG soap 5 days before your scheduled surgery on ________________________.    First, wash and rinse your hair using the CHG soap. Keep CHG soap away from ear canals and eyes.  Rinse completely, do not condition.  Hair extensions should be removed.  Wash your face with your normal soap and rinse.    Apply the CHG solution to a clean wet washcloth.  Turn the water off or move away from the water spray to avoid premature rinsing of the CHG soap as you are applying.   Firmly lather your entire body from the neck down.  Do not use on your face.  Pay special attention to the area(s) where your incision(s) will be located unless they are on your face.  Avoid scrubbing your skin too hard.  The important point is to have the CHG soap sit on your skin for 3 minutes.    When the 3 minutes are up, turn on the water and rinse the CHG solution off your body completely.   DO NOT wash with regular soap after you have used the CHG soap solution  Pat yourself dry with a clean, freshly-laundered towel.  DO NOT apply powders, deodorants, or lotions.  Dress in clean, freshly laundered nightclothes.    Be sure to sleep with clean, freshly laundered sheets.  Be aware that CHG will cause stains on fabrics; if you wash them with bleach after use.  Rinse your washcloth and other linens that have contact with CHG completely.  Use only non-chlorine detergents to launder the items used.   The morning of surgery is the fifth day.  Repeat the above steps and dress in clean comfortable clothing     Whom should I contact if I have any questions regarding the use of CHG soap?  Call the [facility], [facility] at [phone] if you have any questions.               Preoperative Brain Exercises    What are brain exercises?  A brain exercise is any activity that engages your thinking (cognitive) skills.    What types of activities are  considered brain exercises?  Jigsaw puzzles, crossword puzzles, word jumble, memory games, word search, and many more.  Many can be found free online or on your phone via a mobile sirisha.    Why should I do brain exercises before my surgery?  More recent research has shown brain exercise before surgery can lower the risk of postoperative delirium (confusion) which can be especially important for older adults.  Patients who did brain exercises for 5 to 10 hours the days before surgery, cut their risk of postoperative delirium in half up to 1 week after surgery.         [facility]    Preoperative Deep Breathing Exercises    Why it is important to do deep breathing exercises before my surgery?  Deep breathing exercises strengthen your breathing muscles.  This helps you to recover after your surgery and decreases the chance of breathing complications.      How are the deep breathing exercises done?  Sit straight with your back supported.  Breathe in deeply and slowly through your nose. Your lower rib cage should expand and your abdomen may move forward.  Hold that breath for 3 to 5 seconds.  Breathe out through pursed lips, slowly and completely.  Rest and repeat 10 times every hour while awake.  Rest longer if you become dizzy or lightheaded.         Patient and Family Education             Ways You Can Help Prevent Blood Clots             This handout explains some simple things you can do to help prevent blood clots.      Blood clots are blockages that can form in the body's veins. When a blood clot forms in your deep veins, it may be called a deep vein thrombosis, or DVT for short. Blood clots can happen in any part of the body where blood flows, but they are most common in the arms and legs. If a piece of a blood clot breaks free and travels to the lungs, it is called a pulmonary embolus (PE). A PE can be a very serious problem.         Being in the hospital or having surgery can raise your  chances of getting a blood clot because you may not be well enough to move around as much as you normally do.         Ways you can help prevent blood clots in the hospital         Wearing SCDs. SCDs stands for Sequential Compression Devices.   SCDs are special sleeves that wrap around your legs  They attach to a pump that fills them with air to gently squeeze your legs every few minutes.   This helps return the blood in your legs to your heart.   SCDs should only be taken off when walking or bathing.   SCDs may not be comfortable, but they can help save your life.               Wearing compression stockings - if your doctor orders them. These special snug fitting stockings gently squeeze your legs to help blood flow.       Walking. Walking helps move the blood in your legs.   If your doctor says it is ok, try walking the halls at least   5 times a day. Ask us to help you get up, so you don't fall.      Taking any blood thinning medicines your doctor orders.        Page 1 of 2     [facility]; 3/23   Ways you can help prevent blood clots at home       Wearing compression stockings - if your doctor orders them. ? Walking - to help move the blood in your legs.       Taking any blood thinning medicines your doctor orders.      Signs of a blood clot or PE      Tell your doctor or nurse know right away if you have of the problems listed below.    If you are at home, seek medical care right away. Call 911 for chest pain or problems breathing.               Signs of a blood clot (DVT) - such as pain,  swelling, redness or warmth in your arm or leg      Signs of a pulmonary embolism (PE) - such as chest     pain or feeling short of breath

## 2024-07-19 NOTE — H&P (VIEW-ONLY)
CPM/PAT Evaluation       Name: Vee Pimentel (Vee Pimentel)  /Age: 1950/74 y.o.     Visit Type:   In-Person       Chief Complaint: Normal pressure hydrocephalus (Multi)    HPI  Pt is a 74 year old female witj a PMHx significant for HTN, HLD, MARISELA noncompliant with CPAP, cataracts s/p repair, glaucoma, obesity, DM type II, anxiety, depression, osteoarthritis, urinary incontinence and NPH. Pt is being evaluated in [facility] in anticipation of Creation of Right Ventricular Peritoneal Shunt with Dr. Sanford on 24.  Past Medical History:   Diagnosis Date    Anxiety     Arthritis     Basal cell carcinoma of leg, left     Cataract     Depression     Glaucoma     Hyperlipidemia     Hypertension     NPH (normal pressure hydrocephalus) (Multi)     Obesity     Sleep apnea     Type 2 diabetes mellitus (Multi)     Urinary incontinence     Urinary retention     Vision loss     left eye fibrosis       Past Surgical History:   Procedure Laterality Date    APPENDECTOMY      HERNIA REPAIR      LUMBAR PUNCTURE      TONSILLECTOMY         Patient Sexual activity questions deferred to the physician.    Family History   Problem Relation Name Age of Onset    Dementia Mother      Heart failure Mother      Fibromyalgia Mother      Other (ABDOMINAL ANER) Father         Allergies   Allergen Reactions    Azithromycin Angioedema    Metformin GI Upset    Penicillins Other    Amoxicillin Rash       Prior to Admission medications    Medication Sig Start Date End Date Taking? Authorizing Provider   amLODIPine (Norvasc) 5 mg tablet Take 1 tablet (5 mg) by mouth once daily. 24  Yes Radha Calderon MD   glimepiride (Amaryl) 2 mg tablet Take 1 tablet (2 mg) by mouth once daily in the morning. Take before meals. 24  Yes Radha Calderon MD   losartan (Cozaar) 100 mg tablet Take 1 tablet (100 mg) by mouth once daily. 24  Yes Radha Calderon MD   rosuvastatin (Crestor) 20 mg tablet Take 1 tablet (20 mg) by mouth once daily. 24 Yes Radha Calderon MD    empagliflozin (Jardiance) 25 mg Take 1 tablet (25 mg) by mouth once daily.  Patient not taking: Reported on 7/19/2024 5/9/24 8/7/24  Radha Calderon MD        PAT ROS:   Constitutional:   neg    Neuro/Psych:    light-headedness   nervous/anxious  Eyes:    vision loss   visual disturbance   use of corrective lenses  Ears:    tinnitus  Nose:    sinus congestion  Mouth:   neg    Throat:   neg    Neck:   neg    Cardio:   neg    Respiratory:   neg    Endocrine:   neg    GI:    constipation   diarrhea  :    incontinence  Musculoskeletal:    arthralgias   myalgias  Hematologic:    bruises/bleeds easily  Skin:  neg        Physical Exam  Vitals reviewed.   Constitutional:       Appearance: Normal appearance.   HENT:      Head: Normocephalic and atraumatic.      Nose: Congestion present.      Mouth/Throat:      Mouth: Mucous membranes are moist.   Cardiovascular:      Rate and Rhythm: Tachycardia present.      Pulses: Normal pulses.      Heart sounds: Normal heart sounds.   Pulmonary:      Effort: Pulmonary effort is normal.      Breath sounds: Normal breath sounds.   Abdominal:      Palpations: Abdomen is soft.   Musculoskeletal:         General: Normal range of motion.      Cervical back: Normal range of motion.   Skin:     General: Skin is warm.   Neurological:      General: No focal deficit present.      Mental Status: She is alert and oriented to person, place, and time.      Gait: Gait abnormal.   Psychiatric:         Mood and Affect: Mood normal.         Behavior: Behavior normal.          PAT AIRWAY:   Airway:     Mallampati::  II    TM distance::  >3 FB    Neck ROM::  Full  normal        Visit Vitals  /78   Pulse 102   Temp 36.8 °C (98.2 °F)   Resp 18       DASI Risk Score      Flowsheet Row Most Recent Value   DASI SCORE 13.45   METS Score (Will be calculated only when all the questions are answered) 4.4          Caprini DVT Assessment      Flowsheet Row Most Recent Value   DVT Score 11   Current Status Major  surgery planned, lasting over 3 hours   History Prior major surgery   Age 60-75 years   BMI 31-40 (Obesity)          Modified Frailty Index      Flowsheet Row Most Recent Value   Modified Frailty Index Calculator .1818          CHADS2 Stroke Risk  Current as of 17 minutes ago        N/A 3 to 100%: High Risk   2 to < 3%: Medium Risk   0 to < 2%: Low Risk     Last Change: N/A          This score determines the patient's risk of having a stroke if the patient has atrial fibrillation.        This score is not applicable to this patient. Components are not calculated.          Revised Cardiac Risk Index      Flowsheet Row Most Recent Value   Revised Cardiac Risk Calculator 0          Apfel Simplified Score      Flowsheet Row Most Recent Value   Apfel Simplified Score Calculator 3          Risk Analysis Index Results This Encounter    No data found in the last 1 encounters.       Stop Bang Score      Flowsheet Row Most Recent Value   Do you snore loudly? 1   Do you often feel tired or fatigued after your sleep? 1   Has anyone ever observed you stop breathing in your sleep? 1   Do you have or are you being treated for high blood pressure? 1   Recent BMI (Calculated) 37   Is BMI greater than 35 kg/m2? 1=Yes   Age older than 50 years old? 1=Yes   Is your neck circumference greater than 17 inches (Male) or 16 inches (Female)? 0   Gender - Male 0=No   STOP-BANG Total Score 6            Assessment and Plan:     Anesthesia:  The patient denies problems with anesthesia in the past such as PONV, prolonged sedation, awareness, dental damage, aspiration, cardiac arrest, difficult intubation, or unexpected hospital admissions.     Neuro:   The patient has diagnoses or significant findings on chart review or clinical presentation and evaluation significant for NPH. The patient is at increased risk for postoperative delirium secondary to age 65 or older. The patient is at increased risk for perioperative stroke secondary to  hypertension , increased age, hyperlipidemia, female gender, diabetes mellitus, general anesthesia, operative time >2.5 hours.    HEENT/Airway  No diagnoses, significant findings on chart review, clinical presentation, or evaluation.    Cardiovascular  The patient is scheduled for non-cardiac surgery associated with elevated risk. The patient has no major cardiac contraindications to non- cardiac surgery.  RCRI  The patient meets 0-1 RCRI criteria and therefore has a less than 1% risk of major adverse cardiac complications.  METS  The patient's functional capacity capacity is greater than 4 METS.  EKG  The patient has no EKG or echocardiographic changes concerning for myocardial ischemia.   Heart Failure  The patient has no known history of heart failure.  Additionally, the patient reports no symptoms of heart failure and demonstrates no signs of heart failure.  Hypertension Evaluation  The patient has a known history of hypertension that is controlled.  Patient's hypertension is most consistent with stage 1.  Heart Rhythm Evaluation  The patient has no history of arrhythmias.  Heart Valve Evaluation  The patient has a known history of valvular heart disease.  Per patient's prior studies, the patient has mild AVR, MVR and TVR  Cardiology Evaluation  The patient is not followed by cardiology.    The patient has a 30-day risk for MACE of 1 predictor, 6.0% risk for cardiac death, nonfatal myocardial infarction, and nonfatal cardiac arrest.  PITA score which indicates a 0.6% risk of intraoperative or 30-day postoperative MACE (major adverse cardiac event).    Pulmonary   The patient has findings on chart review, clinical presentation and evaluation significant for MARISELA. The patient is at increased risk of perioperative pulmonary complications secondary to neurosurgery, MARISELA, advanced age greater than 60, morbid obesity.    The patient has a stop bang score of 6, which places patient at high risk for having MARISELA.    ARISCAT  26, Intermediate, 13.3% risk of in-hospital postoperative pulmonary complications  PRODIGY 17, high risk of respiratory depression episode. Patient given PI sheet for preoperative deep breathing exercises.    Hematology  No diagnoses or significant findings on chart review or clinical presentation and evaluation.  Antiplatelet management   The patient is not currently receiving antiplatelet therapy.  Anticoagulation management  The patient is not currently receiving anticoagulation therapy.    Caprini score 11, high risk of perioperative VTE.     Patient instructed to ambulate as soon as possible postoperatively to decrease thromboembolic risk. Initiate mechanical DVT prophylaxis as soon as possible and initiate chemical prophylaxis when deemed safe from a bleeding standpoint post surgery.     Transfusion Evaluation  A type and screen was obtained given the likelihood for perioperative transfusion of blood or blood products.    Gastrointestinal  No diagnoses or significant findings on chart review or clinical presentation and evaluation.    Eat 10- 0,  self-perceived oropharyngeal dysphagia scale (0-40)     Genitourinary  The patient has diagnoses or significant findings on chart review or clinical presentation and evaluation significant for urinary incontinence    Renal  No renal diagnoses or significant findings on chart review or clinical presentation and evaluation. The patient has specific risk factors associated with increased risk of perioperative renal complications related to age greater than 55, hypertension, diabetes mellitus.    Musculoskeletal  The patient has diagnoses or significant findings on chart review or clinical presentation and evaluation significant for osteoarthritis    Endocrine  Diabetes Evaluation  The patient has a history of diabetes mellitus that currently appears controlled.  Thyroid Disease Evaluation  The patient has no history of thyroid disease.    ID  No diagnoses or significant  findings on chart review or clinical presentation and evaluation. MRSA screening obtained. Prescriptions and instructions given for Hibiclens and Peridex.    -Preoperative medication instructions were provided and reviewed with the patient.  Any additional testing or evaluation was explained to the patient.  NPO Instructions were discussed, and the patient's questions were answered prior to conclusion of this encounter.     Recent Results (from the past 168 hour(s))   Staphylococcus aureus/MRSA colonization, Culture    Collection Time: 07/19/24  8:26 AM    Specimen: Nares/Axilla/Groin; Swab   Result Value Ref Range    Staph/MRSA Screen Culture No Staphylococcus aureus isolated    CBC    Collection Time: 07/19/24  8:26 AM   Result Value Ref Range    WBC 7.2 4.4 - 11.3 x10*3/uL    nRBC 0.0 0.0 - 0.0 /100 WBCs    RBC 4.48 4.00 - 5.20 x10*6/uL    Hemoglobin 13.4 12.0 - 16.0 g/dL    Hematocrit 41.3 36.0 - 46.0 %    MCV 92 80 - 100 fL    MCH 29.9 26.0 - 34.0 pg    MCHC 32.4 32.0 - 36.0 g/dL    RDW 13.1 11.5 - 14.5 %    Platelets 228 150 - 450 x10*3/uL   Type And Screen    Collection Time: 07/19/24  8:26 AM   Result Value Ref Range    ABO TYPE B     Rh TYPE POS     ANTIBODY SCREEN NEG    Basic Metabolic Panel    Collection Time: 07/19/24  8:26 AM   Result Value Ref Range    Glucose 232 (H) 74 - 99 mg/dL    Sodium 140 136 - 145 mmol/L    Potassium 4.7 3.5 - 5.3 mmol/L    Chloride 102 98 - 107 mmol/L    Bicarbonate 28 21 - 32 mmol/L    Anion Gap 15 10 - 20 mmol/L    Urea Nitrogen 15 6 - 23 mg/dL    Creatinine 0.66 0.50 - 1.05 mg/dL    eGFR >90 >60 mL/min/1.73m*2    Calcium 9.7 8.6 - 10.6 mg/dL   Urinalysis with Reflex Culture and Microscopic    Collection Time: 07/19/24  8:26 AM   Result Value Ref Range    Color, Urine Yellow Light-Yellow, Yellow, Dark-Yellow    Appearance, Urine Clear Clear    Specific Gravity, Urine 1.024 1.005 - 1.035    pH, Urine 5.5 5.0, 5.5, 6.0, 6.5, 7.0, 7.5, 8.0    Protein, Urine NEGATIVE NEGATIVE,  10 (TRACE), 20 (TRACE) mg/dL    Glucose, Urine 100 (1+) (A) Normal mg/dL    Blood, Urine NEGATIVE NEGATIVE    Ketones, Urine TRACE (A) NEGATIVE mg/dL    Bilirubin, Urine NEGATIVE NEGATIVE    Urobilinogen, Urine Normal Normal mg/dL    Nitrite, Urine NEGATIVE NEGATIVE    Leukocyte Esterase, Urine 25 Janis/µL (A) NEGATIVE   Extra Urine Gray Tube    Collection Time: 07/19/24  8:26 AM   Result Value Ref Range    Extra Tube Hold for add-ons.    Microscopic Only, Urine    Collection Time: 07/19/24  8:26 AM   Result Value Ref Range    WBC, Urine 6-10 (A) 1-5, NONE /HPF    RBC, Urine 1-2 NONE, 1-2, 3-5 /HPF    Mucus, Urine FEW Reference range not established. /LPF    Calcium Oxalate Crystals, Urine 3+ (A) NONE, 1+ /HPF   Urine Culture    Collection Time: 07/19/24  8:26 AM    Specimen: Clean Catch/Voided; Urine   Result Value Ref Range    Urine Culture No significant growth

## 2024-07-20 LAB
APPEARANCE UR: CLEAR
BILIRUB UR STRIP.AUTO-MCNC: NEGATIVE MG/DL
CAOX CRY #/AREA UR COMP ASSIST: ABNORMAL /HPF
COLOR UR: YELLOW
GLUCOSE UR STRIP.AUTO-MCNC: ABNORMAL MG/DL
HOLD SPECIMEN: NORMAL
KETONES UR STRIP.AUTO-MCNC: ABNORMAL MG/DL
LEUKOCYTE ESTERASE UR QL STRIP.AUTO: ABNORMAL
MUCOUS THREADS #/AREA URNS AUTO: ABNORMAL /LPF
NITRITE UR QL STRIP.AUTO: NEGATIVE
PH UR STRIP.AUTO: 5.5 [PH]
PROT UR STRIP.AUTO-MCNC: NEGATIVE MG/DL
RBC # UR STRIP.AUTO: NEGATIVE /UL
RBC #/AREA URNS AUTO: ABNORMAL /HPF
SP GR UR STRIP.AUTO: 1.02
STAPHYLOCOCCUS SPEC CULT: NORMAL
UROBILINOGEN UR STRIP.AUTO-MCNC: NORMAL MG/DL
WBC #/AREA URNS AUTO: ABNORMAL /HPF

## 2024-07-21 LAB — BACTERIA UR CULT: NORMAL

## 2024-07-22 LAB
EST. AVERAGE GLUCOSE BLD GHB EST-MCNC: 151 MG/DL
HBA1C MFR BLD: 6.9 %

## 2024-07-27 DIAGNOSIS — I10 PRIMARY HYPERTENSION: ICD-10-CM

## 2024-07-29 RX ORDER — LOSARTAN POTASSIUM 100 MG/1
100 TABLET ORAL DAILY
Qty: 90 TABLET | Refills: 0 | Status: SHIPPED | OUTPATIENT
Start: 2024-07-29

## 2024-07-29 NOTE — PROGRESS NOTES
Pharmacy Medication History Review    Vee Pimentel is a 74 y.o. female who is planned to be admitted for Normal pressure hydrocephalus (Multi). Pharmacy called the patient prior to their scheduled procedure and reviewed the patient's [id number] medications for accuracy.    Medications ADDED:  none  Medications CHANGED:  none  Medications REMOVED:   Rosuvastatin 20mg    Please review updated prior to admission medication list and comments regarding how patient may be taking medications differently by going to Admission tab --> Admission Orders --> Admit Orders / Review prior to admission medications.     Preferred pharmacy and allergies to be confirmed with patient by nursing the day of procedure.     Sources used to complete the med history include spoke with patient's sister in law (suzy), surescripts, oarrs, care everywhere    Below are additional concerns with the patient's PTA list.  Patient stopped taking jardiance and rosuvastatin over a month ago    Tiffany Sharp    [facility]  Please reach out via Secure Chat for questions

## 2024-07-30 ENCOUNTER — ANESTHESIA EVENT (OUTPATIENT)
Dept: OPERATING ROOM | Facility: HOSPITAL | Age: 74
End: 2024-07-30
Payer: MEDICARE

## 2024-07-30 ENCOUNTER — APPOINTMENT (OUTPATIENT)
Dept: RADIOLOGY | Facility: HOSPITAL | Age: 74
End: 2024-07-30
Payer: MEDICARE

## 2024-07-30 ENCOUNTER — ANESTHESIA (OUTPATIENT)
Dept: OPERATING ROOM | Facility: HOSPITAL | Age: 74
End: 2024-07-30
Payer: MEDICARE

## 2024-07-30 ENCOUNTER — HOSPITAL ENCOUNTER (INPATIENT)
Facility: HOSPITAL | Age: 74
LOS: 1 days | Discharge: HOME | End: 2024-07-31
Attending: NEUROLOGICAL SURGERY | Admitting: NEUROLOGICAL SURGERY
Payer: MEDICARE

## 2024-07-30 DIAGNOSIS — G91.2 NORMAL PRESSURE HYDROCEPHALUS (MULTI): Primary | ICD-10-CM

## 2024-07-30 DIAGNOSIS — K59.03 CONSTIPATION DUE TO PAIN MEDICATION: ICD-10-CM

## 2024-07-30 DIAGNOSIS — G89.18 POST-OP PAIN: ICD-10-CM

## 2024-07-30 LAB
ABO GROUP (TYPE) IN BLOOD: NORMAL
ABO GROUP (TYPE) IN BLOOD: NORMAL
ALBUMIN SERPL BCP-MCNC: 4 G/DL (ref 3.4–5)
ANION GAP SERPL CALC-SCNC: 14 MMOL/L (ref 10–20)
ANTIBODY SCREEN: NORMAL
APTT PPP: 32 SECONDS (ref 27–38)
BUN SERPL-MCNC: 13 MG/DL (ref 6–23)
CALCIUM SERPL-MCNC: 9.2 MG/DL (ref 8.6–10.6)
CHLORIDE SERPL-SCNC: 104 MMOL/L (ref 98–107)
CO2 SERPL-SCNC: 26 MMOL/L (ref 21–32)
CREAT SERPL-MCNC: 0.57 MG/DL (ref 0.5–1.05)
EGFRCR SERPLBLD CKD-EPI 2021: >90 ML/MIN/1.73M*2
ERYTHROCYTE [DISTWIDTH] IN BLOOD BY AUTOMATED COUNT: 12.9 % (ref 11.5–14.5)
GLUCOSE BLD MANUAL STRIP-MCNC: 150 MG/DL (ref 74–99)
GLUCOSE BLD MANUAL STRIP-MCNC: 166 MG/DL (ref 74–99)
GLUCOSE BLD MANUAL STRIP-MCNC: 170 MG/DL (ref 74–99)
GLUCOSE BLD MANUAL STRIP-MCNC: 221 MG/DL (ref 74–99)
GLUCOSE SERPL-MCNC: 144 MG/DL (ref 74–99)
HCT VFR BLD AUTO: 38.2 % (ref 36–46)
HGB BLD-MCNC: 12.4 G/DL (ref 12–16)
INR PPP: 1 (ref 0.9–1.1)
MCH RBC QN AUTO: 30.2 PG (ref 26–34)
MCHC RBC AUTO-ENTMCNC: 32.5 G/DL (ref 32–36)
MCV RBC AUTO: 93 FL (ref 80–100)
NRBC BLD-RTO: 0 /100 WBCS (ref 0–0)
PHOSPHATE SERPL-MCNC: 4.3 MG/DL (ref 2.5–4.9)
PLATELET # BLD AUTO: 230 X10*3/UL (ref 150–450)
POTASSIUM SERPL-SCNC: 3.8 MMOL/L (ref 3.5–5.3)
PROTHROMBIN TIME: 11.4 SECONDS (ref 9.8–12.8)
RBC # BLD AUTO: 4.1 X10*6/UL (ref 4–5.2)
RH FACTOR (ANTIGEN D): NORMAL
RH FACTOR (ANTIGEN D): NORMAL
SODIUM SERPL-SCNC: 140 MMOL/L (ref 136–145)
WBC # BLD AUTO: 11 X10*3/UL (ref 4.4–11.3)

## 2024-07-30 PROCEDURE — 2500000005 HC RX 250 GENERAL PHARMACY W/O HCPCS: Performed by: NEUROLOGICAL SURGERY

## 2024-07-30 PROCEDURE — 2500000002 HC RX 250 W HCPCS SELF ADMINISTERED DRUGS (ALT 637 FOR MEDICARE OP, ALT 636 FOR OP/ED): Performed by: STUDENT IN AN ORGANIZED HEALTH CARE EDUCATION/TRAINING PROGRAM

## 2024-07-30 PROCEDURE — 3700000001 HC GENERAL ANESTHESIA TIME - INITIAL BASE CHARGE: Performed by: NEUROLOGICAL SURGERY

## 2024-07-30 PROCEDURE — C1894 INTRO/SHEATH, NON-LASER: HCPCS | Performed by: NEUROLOGICAL SURGERY

## 2024-07-30 PROCEDURE — 2500000001 HC RX 250 WO HCPCS SELF ADMINISTERED DRUGS (ALT 637 FOR MEDICARE OP): Performed by: NEUROLOGICAL SURGERY

## 2024-07-30 PROCEDURE — 36415 COLL VENOUS BLD VENIPUNCTURE: CPT | Performed by: ANESTHESIOLOGY

## 2024-07-30 PROCEDURE — 82947 ASSAY GLUCOSE BLOOD QUANT: CPT

## 2024-07-30 PROCEDURE — 85610 PROTHROMBIN TIME: CPT | Performed by: STUDENT IN AN ORGANIZED HEALTH CARE EDUCATION/TRAINING PROGRAM

## 2024-07-30 PROCEDURE — 2780000003 HC OR 278 NO HCPCS: Performed by: NEUROLOGICAL SURGERY

## 2024-07-30 PROCEDURE — 36415 COLL VENOUS BLD VENIPUNCTURE: CPT | Performed by: STUDENT IN AN ORGANIZED HEALTH CARE EDUCATION/TRAINING PROGRAM

## 2024-07-30 PROCEDURE — 7100000001 HC RECOVERY ROOM TIME - INITIAL BASE CHARGE: Performed by: NEUROLOGICAL SURGERY

## 2024-07-30 PROCEDURE — 70450 CT HEAD/BRAIN W/O DYE: CPT | Performed by: RADIOLOGY

## 2024-07-30 PROCEDURE — 62223 ESTABLISH BRAIN CAVITY SHUNT: CPT | Performed by: NEUROLOGICAL SURGERY

## 2024-07-30 PROCEDURE — 2500000004 HC RX 250 GENERAL PHARMACY W/ HCPCS (ALT 636 FOR OP/ED)

## 2024-07-30 PROCEDURE — 70450 CT HEAD/BRAIN W/O DYE: CPT

## 2024-07-30 PROCEDURE — 00160J6 BYPASS CEREBRAL VENTRICLE TO PERITONEAL CAVITY WITH SYNTHETIC SUBSTITUTE, OPEN APPROACH: ICD-10-PCS | Performed by: NEUROLOGICAL SURGERY

## 2024-07-30 PROCEDURE — A62223 PR CREATE SHUNT:VENTRIC-PERITONEAL: Performed by: ANESTHESIOLOGY

## 2024-07-30 PROCEDURE — 3600000004 HC OR TIME - INITIAL BASE CHARGE - PROCEDURE LEVEL FOUR: Performed by: NEUROLOGICAL SURGERY

## 2024-07-30 PROCEDURE — 2500000004 HC RX 250 GENERAL PHARMACY W/ HCPCS (ALT 636 FOR OP/ED): Performed by: NEUROLOGICAL SURGERY

## 2024-07-30 PROCEDURE — 3600000009 HC OR TIME - EACH INCREMENTAL 1 MINUTE - PROCEDURE LEVEL FOUR: Performed by: NEUROLOGICAL SURGERY

## 2024-07-30 PROCEDURE — C1729 CATH, DRAINAGE: HCPCS | Performed by: NEUROLOGICAL SURGERY

## 2024-07-30 PROCEDURE — 2500000001 HC RX 250 WO HCPCS SELF ADMINISTERED DRUGS (ALT 637 FOR MEDICARE OP): Performed by: STUDENT IN AN ORGANIZED HEALTH CARE EDUCATION/TRAINING PROGRAM

## 2024-07-30 PROCEDURE — C1889 IMPLANT/INSERT DEVICE, NOC: HCPCS | Performed by: NEUROLOGICAL SURGERY

## 2024-07-30 PROCEDURE — 1200000002 HC GENERAL ROOM WITH TELEMETRY DAILY

## 2024-07-30 PROCEDURE — 99100 ANES PT EXTEME AGE<1 YR&>70: CPT | Performed by: ANESTHESIOLOGY

## 2024-07-30 PROCEDURE — 86901 BLOOD TYPING SEROLOGIC RH(D): CPT | Performed by: STUDENT IN AN ORGANIZED HEALTH CARE EDUCATION/TRAINING PROGRAM

## 2024-07-30 PROCEDURE — 85027 COMPLETE CBC AUTOMATED: CPT | Performed by: STUDENT IN AN ORGANIZED HEALTH CARE EDUCATION/TRAINING PROGRAM

## 2024-07-30 PROCEDURE — 2720000007 HC OR 272 NO HCPCS: Performed by: NEUROLOGICAL SURGERY

## 2024-07-30 PROCEDURE — A62223 PR CREATE SHUNT:VENTRIC-PERITONEAL

## 2024-07-30 PROCEDURE — 3700000002 HC GENERAL ANESTHESIA TIME - EACH INCREMENTAL 1 MINUTE: Performed by: NEUROLOGICAL SURGERY

## 2024-07-30 PROCEDURE — 2500000005 HC RX 250 GENERAL PHARMACY W/O HCPCS

## 2024-07-30 PROCEDURE — 80069 RENAL FUNCTION PANEL: CPT | Performed by: STUDENT IN AN ORGANIZED HEALTH CARE EDUCATION/TRAINING PROGRAM

## 2024-07-30 PROCEDURE — 7100000002 HC RECOVERY ROOM TIME - EACH INCREMENTAL 1 MINUTE: Performed by: NEUROLOGICAL SURGERY

## 2024-07-30 PROCEDURE — 2500000004 HC RX 250 GENERAL PHARMACY W/ HCPCS (ALT 636 FOR OP/ED): Performed by: ANESTHESIOLOGY

## 2024-07-30 PROCEDURE — 2500000004 HC RX 250 GENERAL PHARMACY W/ HCPCS (ALT 636 FOR OP/ED): Performed by: STUDENT IN AN ORGANIZED HEALTH CARE EDUCATION/TRAINING PROGRAM

## 2024-07-30 DEVICE — VALVE, POLARIS ANTI-SIPHON X, ANTECHAMBER ADJUSTABLE: Type: IMPLANTABLE DEVICE | Site: BRAIN | Status: FUNCTIONAL

## 2024-07-30 DEVICE — RESERVOIR, RICKHAM, STANDARD: Type: IMPLANTABLE DEVICE | Site: BRAIN | Status: FUNCTIONAL

## 2024-07-30 DEVICE — CATHETER, DISTAL PERITONEAL, BARIUM, OPEN END: Type: IMPLANTABLE DEVICE | Site: BRAIN | Status: FUNCTIONAL

## 2024-07-30 RX ORDER — HYDROMORPHONE HYDROCHLORIDE 1 MG/ML
0.2 INJECTION, SOLUTION INTRAMUSCULAR; INTRAVENOUS; SUBCUTANEOUS EVERY 5 MIN PRN
Status: DISCONTINUED | OUTPATIENT
Start: 2024-07-30 | End: 2024-07-30 | Stop reason: HOSPADM

## 2024-07-30 RX ORDER — DEXTROSE 50 % IN WATER (D50W) INTRAVENOUS SYRINGE
25
Status: DISCONTINUED | OUTPATIENT
Start: 2024-07-30 | End: 2024-07-31 | Stop reason: HOSPADM

## 2024-07-30 RX ORDER — ESMOLOL HYDROCHLORIDE 10 MG/ML
INJECTION INTRAVENOUS AS NEEDED
Status: DISCONTINUED | OUTPATIENT
Start: 2024-07-30 | End: 2024-07-30

## 2024-07-30 RX ORDER — ACETAMINOPHEN 325 MG/1
650 TABLET ORAL EVERY 6 HOURS SCHEDULED
Status: DISCONTINUED | OUTPATIENT
Start: 2024-07-30 | End: 2024-07-31 | Stop reason: HOSPADM

## 2024-07-30 RX ORDER — LIDOCAINE HYDROCHLORIDE 10 MG/ML
0.1 INJECTION INFILTRATION; PERINEURAL ONCE
Status: DISCONTINUED | OUTPATIENT
Start: 2024-07-30 | End: 2024-07-30 | Stop reason: HOSPADM

## 2024-07-30 RX ORDER — GENTAMICIN 40 MG/ML
INJECTION, SOLUTION INTRAMUSCULAR; INTRAVENOUS AS NEEDED
Status: DISCONTINUED | OUTPATIENT
Start: 2024-07-30 | End: 2024-07-30 | Stop reason: HOSPADM

## 2024-07-30 RX ORDER — ONDANSETRON HYDROCHLORIDE 2 MG/ML
INJECTION, SOLUTION INTRAVENOUS AS NEEDED
Status: DISCONTINUED | OUTPATIENT
Start: 2024-07-30 | End: 2024-07-30

## 2024-07-30 RX ORDER — AMOXICILLIN 250 MG
2 CAPSULE ORAL 2 TIMES DAILY
Status: DISCONTINUED | OUTPATIENT
Start: 2024-07-30 | End: 2024-07-31 | Stop reason: HOSPADM

## 2024-07-30 RX ORDER — LOSARTAN POTASSIUM 100 MG/1
100 TABLET ORAL DAILY
Status: DISCONTINUED | OUTPATIENT
Start: 2024-07-30 | End: 2024-07-31 | Stop reason: HOSPADM

## 2024-07-30 RX ORDER — INSULIN LISPRO 100 [IU]/ML
0-5 INJECTION, SOLUTION INTRAVENOUS; SUBCUTANEOUS
Status: DISCONTINUED | OUTPATIENT
Start: 2024-07-30 | End: 2024-07-31 | Stop reason: HOSPADM

## 2024-07-30 RX ORDER — HYDROMORPHONE HYDROCHLORIDE 1 MG/ML
0.2 INJECTION, SOLUTION INTRAMUSCULAR; INTRAVENOUS; SUBCUTANEOUS
Status: DISCONTINUED | OUTPATIENT
Start: 2024-07-30 | End: 2024-07-31 | Stop reason: HOSPADM

## 2024-07-30 RX ORDER — CEFAZOLIN 1 G/1
INJECTION, POWDER, FOR SOLUTION INTRAVENOUS AS NEEDED
Status: DISCONTINUED | OUTPATIENT
Start: 2024-07-30 | End: 2024-07-30

## 2024-07-30 RX ORDER — MIDAZOLAM HYDROCHLORIDE 1 MG/ML
1 INJECTION INTRAMUSCULAR; INTRAVENOUS ONCE AS NEEDED
Status: DISCONTINUED | OUTPATIENT
Start: 2024-07-30 | End: 2024-07-30 | Stop reason: HOSPADM

## 2024-07-30 RX ORDER — HYDRALAZINE HYDROCHLORIDE 20 MG/ML
5 INJECTION INTRAMUSCULAR; INTRAVENOUS EVERY 30 MIN PRN
Status: DISCONTINUED | OUTPATIENT
Start: 2024-07-30 | End: 2024-07-30 | Stop reason: HOSPADM

## 2024-07-30 RX ORDER — LABETALOL HYDROCHLORIDE 5 MG/ML
INJECTION, SOLUTION INTRAVENOUS AS NEEDED
Status: DISCONTINUED | OUTPATIENT
Start: 2024-07-30 | End: 2024-07-30

## 2024-07-30 RX ORDER — SODIUM CHLORIDE 9 MG/ML
75 INJECTION, SOLUTION INTRAVENOUS CONTINUOUS
Status: DISCONTINUED | OUTPATIENT
Start: 2024-07-30 | End: 2024-07-31 | Stop reason: HOSPADM

## 2024-07-30 RX ORDER — PROPOFOL 10 MG/ML
INJECTION, EMULSION INTRAVENOUS AS NEEDED
Status: DISCONTINUED | OUTPATIENT
Start: 2024-07-30 | End: 2024-07-30

## 2024-07-30 RX ORDER — SODIUM CHLORIDE 0.9 G/100ML
IRRIGANT IRRIGATION AS NEEDED
Status: DISCONTINUED | OUTPATIENT
Start: 2024-07-30 | End: 2024-07-30 | Stop reason: HOSPADM

## 2024-07-30 RX ORDER — ROCURONIUM BROMIDE 10 MG/ML
INJECTION, SOLUTION INTRAVENOUS AS NEEDED
Status: DISCONTINUED | OUTPATIENT
Start: 2024-07-30 | End: 2024-07-30

## 2024-07-30 RX ORDER — ROSUVASTATIN CALCIUM 20 MG/1
20 TABLET, COATED ORAL DAILY
Status: DISCONTINUED | OUTPATIENT
Start: 2024-07-30 | End: 2024-07-31 | Stop reason: HOSPADM

## 2024-07-30 RX ORDER — OXYCODONE HYDROCHLORIDE 5 MG/1
10 TABLET ORAL EVERY 4 HOURS PRN
Status: DISCONTINUED | OUTPATIENT
Start: 2024-07-30 | End: 2024-07-30 | Stop reason: HOSPADM

## 2024-07-30 RX ORDER — HYDROMORPHONE HYDROCHLORIDE 1 MG/ML
0.5 INJECTION, SOLUTION INTRAMUSCULAR; INTRAVENOUS; SUBCUTANEOUS EVERY 5 MIN PRN
Status: DISCONTINUED | OUTPATIENT
Start: 2024-07-30 | End: 2024-07-30 | Stop reason: HOSPADM

## 2024-07-30 RX ORDER — SODIUM CHLORIDE, SODIUM LACTATE, POTASSIUM CHLORIDE, CALCIUM CHLORIDE 600; 310; 30; 20 MG/100ML; MG/100ML; MG/100ML; MG/100ML
100 INJECTION, SOLUTION INTRAVENOUS CONTINUOUS
Status: DISCONTINUED | OUTPATIENT
Start: 2024-07-30 | End: 2024-07-30 | Stop reason: HOSPADM

## 2024-07-30 RX ORDER — OXYCODONE HYDROCHLORIDE 5 MG/1
5 TABLET ORAL EVERY 4 HOURS PRN
Status: DISCONTINUED | OUTPATIENT
Start: 2024-07-30 | End: 2024-07-31 | Stop reason: HOSPADM

## 2024-07-30 RX ORDER — METHOCARBAMOL 100 MG/ML
1000 INJECTION, SOLUTION INTRAMUSCULAR; INTRAVENOUS ONCE AS NEEDED
Status: COMPLETED | OUTPATIENT
Start: 2024-07-30 | End: 2024-07-30

## 2024-07-30 RX ORDER — LIDOCAINE HYDROCHLORIDE AND EPINEPHRINE 5; 5 MG/ML; UG/ML
INJECTION, SOLUTION INFILTRATION; PERINEURAL AS NEEDED
Status: DISCONTINUED | OUTPATIENT
Start: 2024-07-30 | End: 2024-07-30 | Stop reason: HOSPADM

## 2024-07-30 RX ORDER — BACITRACIN 500 [USP'U]/G
OINTMENT TOPICAL AS NEEDED
Status: DISCONTINUED | OUTPATIENT
Start: 2024-07-30 | End: 2024-07-30 | Stop reason: HOSPADM

## 2024-07-30 RX ORDER — AMLODIPINE BESYLATE 5 MG/1
5 TABLET ORAL DAILY
Status: DISCONTINUED | OUTPATIENT
Start: 2024-07-30 | End: 2024-07-31 | Stop reason: HOSPADM

## 2024-07-30 RX ORDER — NALOXONE HYDROCHLORIDE 0.4 MG/ML
0.2 INJECTION, SOLUTION INTRAMUSCULAR; INTRAVENOUS; SUBCUTANEOUS EVERY 5 MIN PRN
Status: DISCONTINUED | OUTPATIENT
Start: 2024-07-30 | End: 2024-07-31 | Stop reason: HOSPADM

## 2024-07-30 RX ORDER — DEXTROSE 50 % IN WATER (D50W) INTRAVENOUS SYRINGE
12.5
Status: DISCONTINUED | OUTPATIENT
Start: 2024-07-30 | End: 2024-07-31 | Stop reason: HOSPADM

## 2024-07-30 RX ORDER — LIDOCAINE HCL/PF 100 MG/5ML
SYRINGE (ML) INTRAVENOUS AS NEEDED
Status: DISCONTINUED | OUTPATIENT
Start: 2024-07-30 | End: 2024-07-30

## 2024-07-30 RX ORDER — OXYCODONE HYDROCHLORIDE 5 MG/1
10 TABLET ORAL EVERY 4 HOURS PRN
Status: DISCONTINUED | OUTPATIENT
Start: 2024-07-30 | End: 2024-07-31 | Stop reason: HOSPADM

## 2024-07-30 RX ORDER — HEPARIN SODIUM 5000 [USP'U]/ML
5000 INJECTION, SOLUTION INTRAVENOUS; SUBCUTANEOUS EVERY 8 HOURS
Status: DISCONTINUED | OUTPATIENT
Start: 2024-08-01 | End: 2024-07-31 | Stop reason: HOSPADM

## 2024-07-30 RX ORDER — ONDANSETRON HYDROCHLORIDE 2 MG/ML
4 INJECTION, SOLUTION INTRAVENOUS EVERY 8 HOURS PRN
Status: DISCONTINUED | OUTPATIENT
Start: 2024-07-30 | End: 2024-07-31 | Stop reason: HOSPADM

## 2024-07-30 RX ORDER — FENTANYL CITRATE 50 UG/ML
INJECTION, SOLUTION INTRAMUSCULAR; INTRAVENOUS AS NEEDED
Status: DISCONTINUED | OUTPATIENT
Start: 2024-07-30 | End: 2024-07-30

## 2024-07-30 RX ORDER — ALBUTEROL SULFATE 0.83 MG/ML
2.5 SOLUTION RESPIRATORY (INHALATION) ONCE AS NEEDED
Status: DISCONTINUED | OUTPATIENT
Start: 2024-07-30 | End: 2024-07-30 | Stop reason: HOSPADM

## 2024-07-30 RX ORDER — ONDANSETRON 4 MG/1
4 TABLET, FILM COATED ORAL EVERY 8 HOURS PRN
Status: DISCONTINUED | OUTPATIENT
Start: 2024-07-30 | End: 2024-07-31 | Stop reason: HOSPADM

## 2024-07-30 RX ORDER — MEPERIDINE HYDROCHLORIDE 25 MG/ML
12.5 INJECTION INTRAMUSCULAR; INTRAVENOUS; SUBCUTANEOUS EVERY 10 MIN PRN
Status: DISCONTINUED | OUTPATIENT
Start: 2024-07-30 | End: 2024-07-30 | Stop reason: HOSPADM

## 2024-07-30 RX ORDER — PHENYLEPHRINE HCL IN 0.9% NACL 0.4MG/10ML
SYRINGE (ML) INTRAVENOUS AS NEEDED
Status: DISCONTINUED | OUTPATIENT
Start: 2024-07-30 | End: 2024-07-30

## 2024-07-30 RX ORDER — LABETALOL HYDROCHLORIDE 5 MG/ML
5 INJECTION, SOLUTION INTRAVENOUS EVERY 5 MIN PRN
Status: DISCONTINUED | OUTPATIENT
Start: 2024-07-30 | End: 2024-07-30 | Stop reason: HOSPADM

## 2024-07-30 RX ORDER — ACETAMINOPHEN 325 MG/1
975 TABLET ORAL EVERY 6 HOURS PRN
Status: DISCONTINUED | OUTPATIENT
Start: 2024-07-30 | End: 2024-07-30 | Stop reason: HOSPADM

## 2024-07-30 RX ORDER — POLYETHYLENE GLYCOL 3350 17 G/17G
17 POWDER, FOR SOLUTION ORAL DAILY
Status: DISCONTINUED | OUTPATIENT
Start: 2024-07-30 | End: 2024-07-31 | Stop reason: HOSPADM

## 2024-07-30 ASSESSMENT — PAIN - FUNCTIONAL ASSESSMENT
PAIN_FUNCTIONAL_ASSESSMENT: 0-10

## 2024-07-30 ASSESSMENT — COGNITIVE AND FUNCTIONAL STATUS - GENERAL
DRESSING REGULAR UPPER BODY CLOTHING: A LITTLE
MOVING TO AND FROM BED TO CHAIR: A LITTLE
TOILETING: A LITTLE
WALKING IN HOSPITAL ROOM: A LITTLE
STANDING UP FROM CHAIR USING ARMS: A LITTLE
DRESSING REGULAR LOWER BODY CLOTHING: A LITTLE
MOBILITY SCORE: 19
HELP NEEDED FOR BATHING: A LITTLE
CLIMB 3 TO 5 STEPS WITH RAILING: A LOT
PERSONAL GROOMING: A LITTLE
DAILY ACTIVITIY SCORE: 19

## 2024-07-30 ASSESSMENT — PAIN SCALES - GENERAL
PAINLEVEL_OUTOF10: 3
PAINLEVEL_OUTOF10: 3
PAINLEVEL_OUTOF10: 5 - MODERATE PAIN
PAINLEVEL_OUTOF10: 3
PAINLEVEL_OUTOF10: 4
PAINLEVEL_OUTOF10: 3
PAINLEVEL_OUTOF10: 5 - MODERATE PAIN
PAINLEVEL_OUTOF10: 3
PAINLEVEL_OUTOF10: 0 - NO PAIN
PAINLEVEL_OUTOF10: 5 - MODERATE PAIN

## 2024-07-30 ASSESSMENT — COLUMBIA-SUICIDE SEVERITY RATING SCALE - C-SSRS
2. HAVE YOU ACTUALLY HAD ANY THOUGHTS OF KILLING YOURSELF?: NO
1. IN THE PAST MONTH, HAVE YOU WISHED YOU WERE DEAD OR WISHED YOU COULD GO TO SLEEP AND NOT WAKE UP?: NO
6. HAVE YOU EVER DONE ANYTHING, STARTED TO DO ANYTHING, OR PREPARED TO DO ANYTHING TO END YOUR LIFE?: NO

## 2024-07-30 ASSESSMENT — PAIN DESCRIPTION - LOCATION: LOCATION: NECK

## 2024-07-30 NOTE — ANESTHESIA PROCEDURE NOTES
Peripheral IV  Date/Time: 7/30/2024 7:55 AM  Inserted by: CALLIE Trejo    Placement  Needle size: 18 G  Laterality: left  Location: forearm  Local anesthetic: none  Site prep: chlorhexidine

## 2024-07-30 NOTE — PROGRESS NOTES
"Vee Pimentel is a 74 y.o. female on day 0 of admission presenting with Normal pressure hydrocephalus (Multi).    Subjective          Objective     Physical Exam    Last Recorded Vitals  Blood pressure 173/74, pulse 89, temperature 36.4 °C (97.5 °F), temperature source Temporal, resp. rate 16, height 1.448 m (4' 9\"), weight 78.1 kg (172 lb 2.9 oz), SpO2 97%.  Intake/Output last 3 Shifts:  No intake/output data recorded.    Relevant Results                               Assessment/Plan   Principal Problem:    Normal pressure hydrocephalus (Multi)             CALLIE Trejo      "

## 2024-07-30 NOTE — ANESTHESIA PREPROCEDURE EVALUATION
Patient: Vee Pimentel    Procedure Information       Date/Time: 07/30/24 0715    Procedure: Creation Shunt Ventricular Peritoneal (Right)    Location: [facility] OR 24 / [facility] OR    Surgeons: Sam Sanford MD            Relevant Problems   Cardiac   (+) Hyperlipidemia   (+) Primary hypertension      Pulmonary   (+) Pneumonia due to infectious organism      Endocrine   (+) Type 2 diabetes mellitus (Multi)      ID   (+) Pneumonia due to infectious organism       Clinical information reviewed:   Tobacco  Allergies  Meds   Med Hx  Surg Hx   Fam Hx  Soc Hx        NPO Detail:  NPO/Void Status  Date of Last Liquid: 07/29/24  Time of Last Liquid: 2330  Date of Last Solid: 07/29/24  Time of Last Solid: 2330         Physical Exam    Airway  Mallampati: III  TM distance: >3 FB  Neck ROM: full     Cardiovascular    Dental    Pulmonary    Abdominal            Anesthesia Plan    History of general anesthesia?: yes  History of complications of general anesthesia?: no    ASA 3     general     Anesthetic plan and risks discussed with patient.  Use of blood products discussed with patient who consented to blood products.    Plan discussed with attending and CAA.        Attending Anesthesiologist Note  Above information reviewed including relevant HPI, PMHx, PSHx, anesthesia history, labs, and imaging.    Summary (from patient interview and chart review):   74 year old female     PMHx HTN, HLD, MARISELA noncompliant with CPAP, cataracts s/p repair, glaucoma, obesity, DM type II, anxiety, depression, osteoarthritis, urinary incontinence and NPH.     Here for Right Ventricular Peritoneal Shunt for NPH             Relevant Problems   Cardiac   (+) Hyperlipidemia   (+) Primary hypertension      Pulmonary   (+) Pneumonia due to infectious organism      Endocrine   (+) Type 2 diabetes mellitus (Multi)      ID   (+) Pneumonia due to infectious organism        Medication Documentation Review Audit       Reviewed by Barbara MELARA  "[place], RN (Registered Nurse) on 07/30/24 at 0624      Medication Order Taking? Sig Documenting Provider Last Dose Status   amLODIPine (Norvasc) 5 mg tablet [id number] Yes Take 1 tablet (5 mg) by mouth once daily. Radha Calderon MD 7/29/2024 Active   chlorhexidine (Hibiclens) 4 % external liquid [id number] Yes Apply topically once daily as needed for wound care for up to 5 days. BIBI Rouse-CNP 7/30/2024 Active   glimepiride (Amaryl) 2 mg tablet [id number] Yes Take 1 tablet (2 mg) by mouth once daily in the morning. Take before meals. Radha Calderon MD 7/29/2024 Active   losartan (Cozaar) 100 mg tablet [id number] Yes TAKE 1 TABLET BY MOUTH EVERY DAY Radha Calderon MD 7/29/2024 Active   rosuvastatin (Crestor) 20 mg tablet [id number] Yes Take 1 tablet (20 mg) by mouth once daily. Radha Calderon MD 7/29/2024 Active                    Visit Vitals  /74   Pulse 89   Temp 36.4 °C (97.5 °F) (Temporal)   Resp 16   Ht 1.448 m (4' 9\")   Wt 78.1 kg (172 lb 2.9 oz)   SpO2 97%   BMI 37.26 kg/m²   Smoking Status Never   BSA 1.77 m²            5/9/2024     3:44 PM 6/3/2024     1:22 PM 6/19/2024    12:40 PM 7/8/2024    12:54 PM 7/19/2024     7:53 AM 7/19/2024     8:00 AM 7/30/2024     6:17 AM   Vitals   Systolic 140 152 147 150 143  173   Diastolic 84 79 81 82 78  74   Heart Rate 89 95 74 72 102  89   Temp     36.8 °C (98.2 °F)  36.4 °C (97.5 °F)   Resp  18 17 18  18 16   Height (in) 1.448 m (4' 9\") 1.461 m (4' 9.5\")   1.448 m (4' 9\")  1.448 m (4' 9\")   Weight (lb) 169 166   171  172.18   BMI 36.57 kg/m2 35.3 kg/m2   37 kg/m2  37.26 kg/m2   BSA (m2) 1.76 m2 1.75 m2   1.77 m2  1.77 m2   Visit Report Report Report  Report           Recent Labs:    Chemistry    Lab Results   Component Value Date/Time     07/19/2024 0826    K 4.7 07/19/2024 0826     07/19/2024 0826    CO2 28 07/19/2024 0826    BUN 15 07/19/2024 0826    CREATININE 0.66 07/19/2024 0826    Lab Results   Component Value Date/Time    CALCIUM 9.7 07/19/2024 0826    " ALKPHOS 76 02/21/2024 1548    AST 13 02/21/2024 1548    ALT 13 02/21/2024 1548    BILITOT 0.4 02/21/2024 1548          Lab Results   Component Value Date/Time    WBC 7.2 07/19/2024 0826    HGB 13.4 07/19/2024 0826    HCT 41.3 07/19/2024 0826     07/19/2024 0826     Lab Results   Component Value Date/Time    PROTIME 11.1 06/17/2024 1555    INR 1.0 06/17/2024 1555       Electrocardiogram:  No results found for this or any previous visit (from the past 4464 hour(s)).    Echocardiogram:  Results for orders placed during the hospital encounter of 04/25/24    Transthoracic Echo Complete    [facility], [address] [place] [address]  Tel [phone] and Fax [phone]    TRANSTHORACIC ECHOCARDIOGRAM REPORT    Patient Name:      PETER Saavedra Physician:    [id number] Alexy Mendez MD  Study Date:        4/25/2024    CONCLUSIONS:  1. Left ventricular systolic function is normal with a 60-65% estimated ejection fraction.  2. Spectral Doppler shows an impaired relaxation pattern of left ventricular diastolic filling.  3. Mild aortic valve regurgitation.  4. There is mild mitral, tricuspid and pulmonic regurgitation.    Anesthesia History: no complications  Anesthesia Plan: GA/ETT, std monitors. Pt declines acetaminophen.    I discussed the anesthesia plan with the patient and/or family and reviewed the risks, benefits and alternatives. Agree to proceed.  Ronak Aguila MD PhD

## 2024-07-30 NOTE — ANESTHESIA POSTPROCEDURE EVALUATION
Patient: Vee Pimentel    Procedure Summary       Date: 07/30/24 Room / Location: [facility] OR 24 / [facility] OR    Anesthesia Start: 0739 Anesthesia Stop: 0915    Procedure: Creation Shunt Ventricular Peritoneal (Right: Head) Diagnosis:       Normal pressure hydrocephalus (Multi)      (Normal pressure hydrocephalus (Multi) [G91.2])    Surgeons: Sam Sanford MD Responsible Provider: Ronak Aguila MD PhD    Anesthesia Type: general ASA Status: 3            Anesthesia Type: general    Vitals Value Taken Time   /79 07/30/24 0914   Temp 36.2 °C (97.2 °F) 07/30/24 0911   Pulse 74 07/30/24 0915   Resp 16 07/30/24 0915   SpO2 100 % 07/30/24 0915   Vitals shown include unfiled device data.    Anesthesia Post Evaluation    Patient location during evaluation: PACU  Patient participation: complete - patient participated  Level of consciousness: awake and awake and alert  Pain management: adequate  Airway patency: patent  Cardiovascular status: acceptable, hemodynamically stable and blood pressure returned to baseline  Respiratory status: spontaneous ventilation, acceptable and face mask  Hydration status: acceptable  Postoperative Nausea and Vomiting: none        There were no known notable events for this encounter.

## 2024-07-30 NOTE — OP NOTE
Creation Shunt Ventricular Peritoneal (R) Operative Note     Date: 2024  OR Location: [facility] OR    Name: Vee Pimentel, : 1950, Age: 74 y.o., MRN: [id number], Sex: female    Diagnosis  Pre-op Diagnosis      * Normal pressure hydrocephalus (Multi) [G91.2] Post-op Diagnosis     * Normal pressure hydrocephalus (Multi) [G91.2]     Procedures  Creation Shunt Ventricular Peritoneal  06813 - KS CRTJ SHUNT [id number] TERMINUS      Surgeons      * Sam Sanford - Primary    Resident/Fellow/Other Assistant:  Surgeons and Role:     * Bulmaro Tse MD - Resident - Assisting    Procedure Summary  Anesthesia: Anesthesia type not filed in the log.  ASA: III  Anesthesia Staff: Anesthesiologist: Ronak Aguila MD PhD  C-AA: CALLIE Trejo  SALVADOR: Stephanie Avalos  Estimated Blood Loss: 20mL  Intra-op Medications:   Administrations occurring from 0715 to 1000 on 24:   Medication Name Total Dose   lidocaine-epinephrine (Xylocaine W/EPI) 0.5 %-1:200,000 injection 10 mL   bacitracin ointment 1 Application   gentamicin (Garamycin) injection 40 mg   sodium chloride 0.9 % irrigation solution 1,250 mL              Anesthesia Record               Intraprocedure I/O Totals          Intake    LR bolus 500.00 mL    Total Intake 500 mL          Specimen: No specimens collected     Staff:   Scrub Person: Brooklynn  Circulator: Nancie  Scrub Person: Jane         Drains and/or Catheters: * None in log *    Tourniquet Times:         Implants:  Implants       Type Name Action Serial No.      Neuro Interventional Implant VALVE, POLARIS ANTI-SIPHON X, ANTECHAMBER ADJUSTABLE - [id number] - [id number] Implanted [id number]     Neuro Interventional Implant RESERVOIR, RICKHAM, STANDARD - [id number] - [id number] Implanted [id number]     Neuro Interventional Implant CATHETER, DISTAL PERITONEAL, BARIUM, OPEN END - [id number] - [id number] Implanted [id number]              Findings: sophysa at 150    Indications: Vee Pimentel is an 74 y.o.  female who is having surgery for Normal pressure hydrocephalus (Multi) [G91.2].     The patient was seen in the preoperative area. The risks, benefits, complications, treatment options, non-operative alternatives, expected recovery and outcomes were discussed with the patient. The possibilities of reaction to medication, pulmonary aspiration, injury to surrounding structures, bleeding, recurrent infection, the need for additional procedures, failure to diagnose a condition, and creating a complication requiring transfusion or operation were discussed with the patient. The patient concurred with the proposed plan, giving informed consent.  The site of surgery was properly noted/marked if necessary per policy. The patient has been actively warmed in preoperative area. Preoperative antibiotics have been ordered and given within 1 hours of incision. Venous thrombosis prophylaxis have been ordered including bilateral sequential compression devices    Procedure Details: Patient was identified in the preop and brought back to the operating room. A safety huddle was performed and patient identifiers, operative site, medications and allergies reviewed. Patient was transferred to the operating table. All pressure points were padded. General anesthesia was achieved and patient underwent endotracheal intubation. C-shaped cranial incision was designed over RIGHT side Kocher's point at midpupillary and coronal suture. This area along with the projected track of the shunt was cleansed, prepped and draped in usual sterile fashion.  Local anesthetic was used to infiltrate skin over incision. A 15 blade scalpel was used to open the cranial incision along with electrocautery. A burrhole in the center of the flap was made with a small acorn bit. The dura was coagulated with bipolar electrocautery. A pocket was made under the scalp for the shunt valve and a relay incision was made just behind the ear.    Then, abdominal access was made  with laparoscopic trochar; abdomen was insufflated and intraperitoneal placement was confirmed with laparoscopy. A second abdominal site was accessed; Guide wire and peel-away sheath was placed in second incision with intraperitoneal placement again confirmed.    Shunt tunneler was passed from postauricular incision to abdominal incision; trochar was removed and tubing was passed through the retained tunneling sheath. This tubing was brought from  postauricular incision to C-shaped scalp incision. Sophysa valve with anti guard (set at 150) was attached to this A-tubing and sheath was removed.    Then, a cruciate incision was made in the dura, and the dural leaflets were coagulated. Ventricular catheter was passed into ventricle with good return of CSF. Rickham reservoir was connected to proximal catheter and valve attached to rickham reservoir. Attachment sites were secured with silk ties; flow into distal tubing was confirmed by pumping shunt reservoir.    Distal A-tubing was then passed through peel-away sheath and sheath was removed after tubing was intra-abdominal.    All incisions were copiously irrigated with antibiotic impregnated normal saline. All incisions were closed with 2-0 vicryls for the deeper layers followed by running 4-0 monocryl for the skin for the cranial incisions and a subcuticular 4-0 monocryl followed by skin glue for the abdomen. All counts were correct at end of case and patient was turned over to anesthesia for extubation    The use of abdominal laparoscopy for placement of the peritoneal portion of the shunt increased the difficulty of the case by 33%     Dr. Sanford was present for all critical portions of the case   Complications:  None; patient tolerated the procedure well.    Disposition: PACU - hemodynamically stable.  Condition: stable         Additional Details: none    Attending Attestation:     Sam Sanford  Phone Number: [phone]

## 2024-07-30 NOTE — HOSPITAL COURSE
Vee Pimentel is a 74 year old female with PMH of HTN, HLD, T2DM, NPH.  7/30 s/p RF VPS (Sophysa at 150), CTH cath in position.   On the day of discharge, the patient was seen and evaluated by the neurosurgery team and deemed suitable for discharge to home.    There were no significant events overnight. Vitals were reviewed and within normal limits. Labs were stable at discharge. On day of discharge the patient was tolerating a diet, pain was controlled on PO pain medication, was ambulating well and voiding spontaneously. The patient was given detailed discharge instructions and were scheduled to follow up as an outpatient.

## 2024-07-30 NOTE — PROGRESS NOTES
Patient: Vee Pimentel    Vitals Value Taken Time   /77 07/30/24 0912   Temp 36.2 07/30/24 0912   Pulse 77 07/30/24 0912   Resp 18 07/30/24 0912   SpO2 100 07/30/24 0912       [unfilled]

## 2024-07-30 NOTE — ANESTHESIA PROCEDURE NOTES
Airway  Date/Time: 7/30/2024 7:52 AM  Urgency: elective    Airway not difficult    Staffing  Performed: SALVADOR   Authorized by: Ronak Aguila MD PhD    Performed by: CALLIE Trejo  Patient location during procedure: OR    Indications and Patient Condition  Indications for airway management: anesthesia  Spontaneous ventilation: present  Sedation level: deep  Preoxygenated: yes  Patient position: sniffing  Mask difficulty assessment: 1 - vent by mask    Final Airway Details  Final airway type: endotracheal airway      Successful airway: ETT  Cuffed: yes   Successful intubation technique: direct laryngoscopy  Facilitating devices/methods: cricoid pressure and intubating stylet  Endotracheal tube insertion site: oral  Blade: Edison  Blade size: #3  ETT size (mm): 7.0  Cormack-Lehane Classification: grade IIb - view of arytenoids or posterior of glottis only  Placement verified by: capnometry and palpation of cuff   Measured from: lips  ETT to lips (cm): 22  Number of attempts at approach: 1

## 2024-07-31 VITALS
HEART RATE: 67 BPM | HEIGHT: 57 IN | WEIGHT: 172.84 LBS | RESPIRATION RATE: 17 BRPM | BODY MASS INDEX: 37.29 KG/M2 | OXYGEN SATURATION: 92 % | DIASTOLIC BLOOD PRESSURE: 73 MMHG | TEMPERATURE: 99.3 F | SYSTOLIC BLOOD PRESSURE: 148 MMHG

## 2024-07-31 LAB — GLUCOSE BLD MANUAL STRIP-MCNC: 194 MG/DL (ref 74–99)

## 2024-07-31 PROCEDURE — 2500000001 HC RX 250 WO HCPCS SELF ADMINISTERED DRUGS (ALT 637 FOR MEDICARE OP): Performed by: STUDENT IN AN ORGANIZED HEALTH CARE EDUCATION/TRAINING PROGRAM

## 2024-07-31 PROCEDURE — 2500000004 HC RX 250 GENERAL PHARMACY W/ HCPCS (ALT 636 FOR OP/ED): Performed by: STUDENT IN AN ORGANIZED HEALTH CARE EDUCATION/TRAINING PROGRAM

## 2024-07-31 PROCEDURE — 99239 HOSP IP/OBS DSCHRG MGMT >30: CPT

## 2024-07-31 PROCEDURE — 82947 ASSAY GLUCOSE BLOOD QUANT: CPT

## 2024-07-31 PROCEDURE — 2500000002 HC RX 250 W HCPCS SELF ADMINISTERED DRUGS (ALT 637 FOR MEDICARE OP, ALT 636 FOR OP/ED): Performed by: STUDENT IN AN ORGANIZED HEALTH CARE EDUCATION/TRAINING PROGRAM

## 2024-07-31 RX ORDER — OXYCODONE HYDROCHLORIDE 5 MG/1
5 TABLET ORAL EVERY 6 HOURS PRN
Qty: 28 TABLET | Refills: 0 | Status: SHIPPED | OUTPATIENT
Start: 2024-07-31 | End: 2024-08-06 | Stop reason: WASHOUT

## 2024-07-31 RX ORDER — AMOXICILLIN 250 MG
2 CAPSULE ORAL DAILY
Qty: 14 TABLET | Refills: 0 | Status: SHIPPED | OUTPATIENT
Start: 2024-07-31 | End: 2024-08-06 | Stop reason: WASHOUT

## 2024-07-31 ASSESSMENT — COGNITIVE AND FUNCTIONAL STATUS - GENERAL
MOVING TO AND FROM BED TO CHAIR: A LITTLE
CLIMB 3 TO 5 STEPS WITH RAILING: A LITTLE
HELP NEEDED FOR BATHING: A LITTLE
DRESSING REGULAR LOWER BODY CLOTHING: A LITTLE
WALKING IN HOSPITAL ROOM: A LITTLE
STANDING UP FROM CHAIR USING ARMS: A LITTLE
PERSONAL GROOMING: A LITTLE
MOBILITY SCORE: 20
DAILY ACTIVITIY SCORE: 19
TOILETING: A LITTLE
DRESSING REGULAR UPPER BODY CLOTHING: A LITTLE

## 2024-07-31 ASSESSMENT — PAIN DESCRIPTION - LOCATION: LOCATION: HEAD

## 2024-07-31 ASSESSMENT — PAIN - FUNCTIONAL ASSESSMENT: PAIN_FUNCTIONAL_ASSESSMENT: 0-10

## 2024-07-31 ASSESSMENT — PAIN SCALES - GENERAL
PAINLEVEL_OUTOF10: 3
PAINLEVEL_OUTOF10: 4

## 2024-07-31 NOTE — DISCHARGE SUMMARY
Discharge Diagnosis  Normal pressure hydrocephalus (Multi)    Issues Requiring Follow-Up  NA    Test Results Pending At Discharge  Pending Labs       No current pending labs.            Hospital Course  Vee Pimentel is a 74 year old female with PMH of HTN, HLD, T2DM, NPH.  7/30 s/p RF VPS (Sophysa at 150), CTH cath in position.   On the day of discharge, the patient was seen and evaluated by the neurosurgery team and deemed suitable for discharge to home.    There were no significant events overnight. Vitals were reviewed and within normal limits. Labs were stable at discharge. On day of discharge the patient was tolerating a diet, pain was controlled on PO pain medication, was ambulating well and voiding spontaneously. The patient was given detailed discharge instructions and were scheduled to follow up as an outpatient.       Pertinent Physical Exam At Time of Discharge  Physical Exam  HENT:      Head:      Comments: Incision C/D/I   Eyes:      Pupils: Pupils are equal, round, and reactive to light.   Cardiovascular:      Pulses: Normal pulses.   Pulmonary:      Effort: Pulmonary effort is normal.   Abdominal:      Palpations: Abdomen is soft.   Neurological:      Mental Status: She is alert and oriented to person, place, and time.       Home Medications     Medication List      START taking these medications     oxyCODONE 5 mg immediate release tablet; Commonly known as: Roxicodone;   Take 1 tablet (5 mg) by mouth every 6 hours if needed for severe pain (7 -   10) or moderate pain (4 - 6).   sennosides-docusate sodium 8.6-50 mg tablet; Commonly known as:   Rosmery-Colace; Take 2 tablets by mouth once daily. For constipation while   taking pain medication Oxycodone     CONTINUE taking these medications     amLODIPine 5 mg tablet; Commonly known as: Norvasc; Take 1 tablet (5 mg)   by mouth once daily.   glimepiride 2 mg tablet; Commonly known as: Amaryl; Take 1 tablet (2 mg)   by mouth once daily in the morning. Take  before meals.   losartan 100 mg tablet; Commonly known as: Cozaar; TAKE 1 TABLET BY   MOUTH EVERY DAY   rosuvastatin 20 mg tablet; Commonly known as: Crestor; Take 1 tablet (20   mg) by mouth once daily.     STOP taking these medications     chlorhexidine 4 % external liquid; Commonly known as: Hibiclens       Outpatient Follow-Up  Future Appointments   Date Time Provider Department Center   8/14/2024  9:00 AM NEUROSURGERY [facility] NURSE [id number] [facility]   9/9/2024  2:30 PM Sam Sanford MD [id number] [facility]       Amber Kumar, APRN-CNP    Total face to face time spent with patient/family of 30 minutes, with >50% of the time spent discussing plan of care/management, counseling/educating on disease processes, explaining results of diagnostic testing.

## 2024-07-31 NOTE — CARE PLAN
The patient's goals for the shift include      The clinical goals for the shift include Pt will remain free from falls/injury. Pt will continue to increase mobility. And pt will be able to discharge    Problem: Pain - Adult  Goal: Verbalizes/displays adequate comfort level or baseline comfort level  Outcome: Met     Problem: Safety - Adult  Goal: Free from fall injury  Outcome: Met     Problem: Discharge Planning  Goal: Discharge to home or other facility with appropriate resources  Outcome: Met     Problem: Chronic Conditions and Co-morbidities  Goal: Patient's chronic conditions and co-morbidity symptoms are monitored and maintained or improved  Outcome: Met

## 2024-07-31 NOTE — CARE PLAN
The patient's goals for the shift include      The clinical goals for the shift include pt will remain safe and frewe from falls    The pt remained safe and free from falls.    Problem: Pain - Adult  Goal: Verbalizes/displays adequate comfort level or baseline comfort level  Reactivated     Problem: Safety - Adult  Goal: Free from fall injury  Reactivated     Problem: Discharge Planning  Goal: Discharge to home or other facility with appropriate resources  Reactivated     Problem: Chronic Conditions and Co-morbidities  Goal: Patient's chronic conditions and co-morbidity symptoms are monitored and maintained or improved  Reactivated

## 2024-07-31 NOTE — PROGRESS NOTES
"Vee Pimentel is a 74 y.o. female on day 1 of admission presenting with Normal pressure hydrocephalus (Multi).    Subjective   No events overnight. Patient is eager to go home today. Tolerating PO, pain controlled    Objective     Physical Exam  AOx3  BUE 5/5  BLE 5/5  R cranial and posterior auricular incisions covered with dressing w/o bloody strike through  SILT     Last Recorded Vitals  Blood pressure 144/74, pulse 71, temperature 37.5 °C (99.5 °F), temperature source Temporal, resp. rate 17, height 1.448 m (4' 9.01\"), weight 78.4 kg (172 lb 13.5 oz), SpO2 91%.  Intake/Output last 3 Shifts:  I/O last 3 completed shifts:  In: 1900 (24.2 mL/kg) [I.V.:900 (11.5 mL/kg); IV Piggyback:1000]  Out: 2115 (27 mL/kg) [Urine:2100 (0.7 mL/kg/hr); Blood:15]  Weight: 78.4 kg     Relevant Results              Assessment/Plan   Principal Problem:    Normal pressure hydrocephalus (Multi)    73 y/o F w/ h/o HTN, HLD, T2DM, NPH, 7/30 s/p RF VPS (Sophysa at 150), CTH POC     Floor  Con't pain control  Mobilize   SCDs    Medically ready for discharge           Lake Lugo MD      "

## 2024-08-01 ENCOUNTER — PATIENT OUTREACH (OUTPATIENT)
Dept: PRIMARY CARE | Facility: CLINIC | Age: 74
End: 2024-08-01
Payer: MEDICARE

## 2024-08-01 ENCOUNTER — DOCUMENTATION (OUTPATIENT)
Dept: PRIMARY CARE | Facility: CLINIC | Age: 74
End: 2024-08-01
Payer: MEDICARE

## 2024-08-06 ENCOUNTER — APPOINTMENT (OUTPATIENT)
Dept: PRIMARY CARE | Facility: CLINIC | Age: 74
End: 2024-08-06
Payer: MEDICARE

## 2024-08-06 VITALS
HEIGHT: 57 IN | SYSTOLIC BLOOD PRESSURE: 128 MMHG | OXYGEN SATURATION: 98 % | WEIGHT: 170 LBS | BODY MASS INDEX: 36.68 KG/M2 | TEMPERATURE: 98.7 F | HEART RATE: 82 BPM | DIASTOLIC BLOOD PRESSURE: 82 MMHG

## 2024-08-06 DIAGNOSIS — E78.2 MIXED HYPERLIPIDEMIA: ICD-10-CM

## 2024-08-06 DIAGNOSIS — E11.65 TYPE 2 DIABETES MELLITUS WITH HYPERGLYCEMIA, WITHOUT LONG-TERM CURRENT USE OF INSULIN (MULTI): ICD-10-CM

## 2024-08-06 PROCEDURE — 1159F MED LIST DOCD IN RCRD: CPT | Performed by: STUDENT IN AN ORGANIZED HEALTH CARE EDUCATION/TRAINING PROGRAM

## 2024-08-06 PROCEDURE — 4010F ACE/ARB THERAPY RXD/TAKEN: CPT | Performed by: STUDENT IN AN ORGANIZED HEALTH CARE EDUCATION/TRAINING PROGRAM

## 2024-08-06 PROCEDURE — 99496 TRANSJ CARE MGMT HIGH F2F 7D: CPT | Performed by: STUDENT IN AN ORGANIZED HEALTH CARE EDUCATION/TRAINING PROGRAM

## 2024-08-06 PROCEDURE — 1111F DSCHRG MED/CURRENT MED MERGE: CPT | Performed by: STUDENT IN AN ORGANIZED HEALTH CARE EDUCATION/TRAINING PROGRAM

## 2024-08-06 PROCEDURE — 3050F LDL-C >= 130 MG/DL: CPT | Performed by: STUDENT IN AN ORGANIZED HEALTH CARE EDUCATION/TRAINING PROGRAM

## 2024-08-06 PROCEDURE — 3008F BODY MASS INDEX DOCD: CPT | Performed by: STUDENT IN AN ORGANIZED HEALTH CARE EDUCATION/TRAINING PROGRAM

## 2024-08-06 PROCEDURE — 3074F SYST BP LT 130 MM HG: CPT | Performed by: STUDENT IN AN ORGANIZED HEALTH CARE EDUCATION/TRAINING PROGRAM

## 2024-08-06 PROCEDURE — 3079F DIAST BP 80-89 MM HG: CPT | Performed by: STUDENT IN AN ORGANIZED HEALTH CARE EDUCATION/TRAINING PROGRAM

## 2024-08-06 PROCEDURE — 1036F TOBACCO NON-USER: CPT | Performed by: STUDENT IN AN ORGANIZED HEALTH CARE EDUCATION/TRAINING PROGRAM

## 2024-08-06 PROCEDURE — 3044F HG A1C LEVEL LT 7.0%: CPT | Performed by: STUDENT IN AN ORGANIZED HEALTH CARE EDUCATION/TRAINING PROGRAM

## 2024-08-06 RX ORDER — ROSUVASTATIN CALCIUM 20 MG/1
20 TABLET, COATED ORAL DAILY
Qty: 90 TABLET | Refills: 0 | Status: SHIPPED | OUTPATIENT
Start: 2024-08-06 | End: 2024-11-04

## 2024-08-06 RX ORDER — GLIMEPIRIDE 2 MG/1
2 TABLET ORAL
Qty: 90 TABLET | Refills: 0 | Status: SHIPPED | OUTPATIENT
Start: 2024-08-06

## 2024-08-06 NOTE — PROGRESS NOTES
"Patient: Vee Pimentel  : 1950  PCP: Radha Calderon MD  MRN: [id number]  Program: Transitional Care Management  Status: Enrolled  Effective Dates: 2024 - present  Responsible Staff: Erin Snow LPN  Social Determinants to be Addressed: Alcohol Use, Financial Resource Strain, Physical Activity, Social Connections, Stress, Transportation Needs      Vee Pimentel is a 74 y.o. female presenting today for follow-up after being discharged from the hospital 6 days ago. The main problem requiring admission was  Shunt placement for Normal Pressure Hydrocephalus. The discharge summary and/or Transitional Care Management documentation was reviewed. Medication reconciliation was performed as indicated via the \"Hao as Reviewed\" timestamp.     Vee Pimentel was contacted by Transitional Care Management services two days after her discharge. This encounter and supporting documentation was reviewed.    Ms. Pimentel is presenting today for hospital discharge follow-up after  shunt placement for normal pressure hydrocephalus.  She presents today with her  and sister-in-law; all are in agreement that her overall mental status, behavior, and energy levels have improved since discharge.  She is still experiencing the same level of urinary incontinence and difficulty with walking.  She also states that she has been having more and better bowel movements.  She denies fever any drainage or excessive tenderness at the incision sites, nausea, vomiting, or diarrhea.  Her only complaint is excessive bruising on the right side of her neck, her right breast, her and surrounding the incision sites on her abdomen.     Review of Systems  All else negative    /82   Pulse 82   Temp 37.1 °C (98.7 °F)   Ht 1.448 m (4' 9.01\")   Wt 77.1 kg (170 lb)   SpO2 98%   BMI 36.78 kg/m²     Physical Exam  Constitutional:       Appearance: Normal appearance.   HENT:      Head:      Comments:   Clean and well healing incision site " on the R side of her scalp  Cardiovascular:      Rate and Rhythm: Normal rate and regular rhythm.      Heart sounds: Normal heart sounds.   Pulmonary:      Effort: Pulmonary effort is normal. No respiratory distress.      Breath sounds: Normal breath sounds.   Abdominal:      Comments:   Clean and well healing incision sites on the abdomen with minimal surrounding ecchymosis    Musculoskeletal:      Cervical back: Normal range of motion.   Skin:     Comments:   Significant bruising noted on the R side of pt's neck and on R breast; non-tender to palpation   Neurological:      Mental Status: She is alert.       The complexity of medical decision making for this patient's transitional care is moderate.    Assessment/Plan   Diagnoses and all orders for this visit:  Mixed hyperlipidemia  -     rosuvastatin (Crestor) 20 mg tablet; Take 1 tablet (20 mg) by mouth once daily.  Type 2 diabetes mellitus with hyperglycemia, without long-term current use of insulin (Multi)  -     glimepiride (Amaryl) 2 mg tablet; Take 1 tablet (2 mg) by mouth once daily in the morning. Take before meals.    Ms. Pimentel is a 74-year-old female presenting today for hospital discharge follow-up after  shunt placement for normal pressure hydrocephalus.  She is recovering well from surgery and there are no signs of infection or postsurgical complications.  She has follow-up with neurosurgery on August 14.  Detailed ED/return precautions were discussed such as acute worsening of mental status, weakness, worsening bruising, severe headache. Refills provided. RTC as needed.     Gordy Bhandari, DO  PGY-2 Family Medicine

## 2024-08-08 DIAGNOSIS — E11.65 TYPE 2 DIABETES MELLITUS WITH HYPERGLYCEMIA, WITHOUT LONG-TERM CURRENT USE OF INSULIN (MULTI): Primary | ICD-10-CM

## 2024-08-12 ENCOUNTER — TELEPHONE (OUTPATIENT)
Dept: NEUROSURGERY | Facility: HOSPITAL | Age: 74
End: 2024-08-12
Payer: MEDICARE

## 2024-08-12 NOTE — TELEPHONE ENCOUNTER
Talked to Abril and she said that the incisions are with out redness, swelling or drainage. She will see Dr. Sanford in a few weeks.

## 2024-08-13 ENCOUNTER — PATIENT OUTREACH (OUTPATIENT)
Dept: PRIMARY CARE | Facility: CLINIC | Age: 74
End: 2024-08-13
Payer: MEDICARE

## 2024-08-13 NOTE — PROGRESS NOTES
Call regarding appt. with PCP on  8/6/24 after hospitalization.  At time of outreach call the patient feels as if their condition has improved  since last visit.  Reviewed with patient the PCP appointment and any questions or concerns regarding the appt.

## 2024-08-14 ENCOUNTER — APPOINTMENT (OUTPATIENT)
Dept: NEUROSURGERY | Facility: HOSPITAL | Age: 74
End: 2024-08-14
Payer: MEDICARE

## 2024-09-09 ENCOUNTER — APPOINTMENT (OUTPATIENT)
Dept: NEUROSURGERY | Facility: CLINIC | Age: 74
End: 2024-09-09
Payer: MEDICARE

## 2024-09-09 VITALS
BODY MASS INDEX: 37.16 KG/M2 | SYSTOLIC BLOOD PRESSURE: 160 MMHG | WEIGHT: 177 LBS | HEART RATE: 80 BPM | HEIGHT: 58 IN | DIASTOLIC BLOOD PRESSURE: 88 MMHG | RESPIRATION RATE: 18 BRPM

## 2024-09-09 DIAGNOSIS — G91.2 NPH (NORMAL PRESSURE HYDROCEPHALUS) (MULTI): Primary | ICD-10-CM

## 2024-09-09 PROCEDURE — 3044F HG A1C LEVEL LT 7.0%: CPT | Performed by: NEUROLOGICAL SURGERY

## 2024-09-09 PROCEDURE — 99024 POSTOP FOLLOW-UP VISIT: CPT | Performed by: NEUROLOGICAL SURGERY

## 2024-09-09 PROCEDURE — 1036F TOBACCO NON-USER: CPT | Performed by: NEUROLOGICAL SURGERY

## 2024-09-09 PROCEDURE — 3008F BODY MASS INDEX DOCD: CPT | Performed by: NEUROLOGICAL SURGERY

## 2024-09-09 PROCEDURE — 4010F ACE/ARB THERAPY RXD/TAKEN: CPT | Performed by: NEUROLOGICAL SURGERY

## 2024-09-09 PROCEDURE — 1159F MED LIST DOCD IN RCRD: CPT | Performed by: NEUROLOGICAL SURGERY

## 2024-09-09 PROCEDURE — 3050F LDL-C >= 130 MG/DL: CPT | Performed by: NEUROLOGICAL SURGERY

## 2024-09-09 PROCEDURE — 1126F AMNT PAIN NOTED NONE PRSNT: CPT | Performed by: NEUROLOGICAL SURGERY

## 2024-09-09 PROCEDURE — 62252 CSF SHUNT REPROGRAM: CPT | Performed by: NEUROLOGICAL SURGERY

## 2024-09-09 PROCEDURE — 3079F DIAST BP 80-89 MM HG: CPT | Performed by: NEUROLOGICAL SURGERY

## 2024-09-09 PROCEDURE — 3077F SYST BP >= 140 MM HG: CPT | Performed by: NEUROLOGICAL SURGERY

## 2024-09-09 ASSESSMENT — PAIN SCALES - GENERAL: PAINLEVEL: 0-NO PAIN

## 2024-09-09 NOTE — PROGRESS NOTES
7/30/24  Shunt at 150. Today is still having trouble with memory and falling asleep. Feels better since the shunt.    74-year-old woman returns for follow-up after surgery for placement of a ventriculoperitoneal shunt for normal pressure hydrocephalus.  She feels that her balance and ambulation is slightly better.    On exam, she is alert and interactive.  Extract movements are intact.  Face moves symmetrically.  She moves all extremities symmetrically.  Her incisions are clean and intact except for a small amount of eschar along the cranial incision.  There is trace erythema around her abdominal incisions.  There is no evidence of exudate or drainage.    The patient is recovering well from her surgery and has shown a small response to CSF diversion.  To see if she can continue to improve I have set the opening pressure of her shunt to 110.  She will contact my office to let me know how she is doing and if we should consider further shunt adjustments.

## 2024-10-02 ENCOUNTER — PATIENT OUTREACH (OUTPATIENT)
Dept: PRIMARY CARE | Facility: CLINIC | Age: 74
End: 2024-10-02
Payer: MEDICARE

## 2024-10-28 ENCOUNTER — APPOINTMENT (OUTPATIENT)
Dept: NEUROSURGERY | Facility: CLINIC | Age: 74
End: 2024-10-28
Payer: MEDICARE

## 2024-10-28 ENCOUNTER — PATIENT OUTREACH (OUTPATIENT)
Dept: PRIMARY CARE | Facility: CLINIC | Age: 74
End: 2024-10-28

## 2024-10-28 VITALS
WEIGHT: 177 LBS | HEIGHT: 58 IN | DIASTOLIC BLOOD PRESSURE: 88 MMHG | SYSTOLIC BLOOD PRESSURE: 158 MMHG | HEART RATE: 86 BPM | RESPIRATION RATE: 18 BRPM | BODY MASS INDEX: 37.16 KG/M2

## 2024-10-28 DIAGNOSIS — G91.2 NPH (NORMAL PRESSURE HYDROCEPHALUS) (MULTI): Primary | ICD-10-CM

## 2024-10-28 PROCEDURE — 1159F MED LIST DOCD IN RCRD: CPT | Performed by: NEUROLOGICAL SURGERY

## 2024-10-28 PROCEDURE — 3008F BODY MASS INDEX DOCD: CPT | Performed by: NEUROLOGICAL SURGERY

## 2024-10-28 PROCEDURE — 3077F SYST BP >= 140 MM HG: CPT | Performed by: NEUROLOGICAL SURGERY

## 2024-10-28 PROCEDURE — 62252 CSF SHUNT REPROGRAM: CPT | Performed by: NEUROLOGICAL SURGERY

## 2024-10-28 PROCEDURE — 3050F LDL-C >= 130 MG/DL: CPT | Performed by: NEUROLOGICAL SURGERY

## 2024-10-28 PROCEDURE — 3079F DIAST BP 80-89 MM HG: CPT | Performed by: NEUROLOGICAL SURGERY

## 2024-10-28 PROCEDURE — 1125F AMNT PAIN NOTED PAIN PRSNT: CPT | Performed by: NEUROLOGICAL SURGERY

## 2024-10-28 PROCEDURE — 4010F ACE/ARB THERAPY RXD/TAKEN: CPT | Performed by: NEUROLOGICAL SURGERY

## 2024-10-28 PROCEDURE — 3044F HG A1C LEVEL LT 7.0%: CPT | Performed by: NEUROLOGICAL SURGERY

## 2024-10-28 PROCEDURE — 1036F TOBACCO NON-USER: CPT | Performed by: NEUROLOGICAL SURGERY

## 2024-10-28 ASSESSMENT — PAIN SCALES - GENERAL: PAINLEVEL_OUTOF10: 8

## 2024-12-10 ENCOUNTER — APPOINTMENT (OUTPATIENT)
Dept: PRIMARY CARE | Facility: CLINIC | Age: 74
End: 2024-12-10
Payer: MEDICARE

## 2024-12-10 VITALS
HEART RATE: 98 BPM | OXYGEN SATURATION: 97 % | SYSTOLIC BLOOD PRESSURE: 140 MMHG | WEIGHT: 182 LBS | DIASTOLIC BLOOD PRESSURE: 84 MMHG | HEIGHT: 60 IN | BODY MASS INDEX: 35.73 KG/M2

## 2024-12-10 DIAGNOSIS — R26.9 GAIT DISTURBANCE: ICD-10-CM

## 2024-12-10 DIAGNOSIS — E11.9 NON-INSULIN DEPENDENT TYPE 2 DIABETES MELLITUS (MULTI): ICD-10-CM

## 2024-12-10 DIAGNOSIS — R32 URINARY INCONTINENCE, UNSPECIFIED TYPE: ICD-10-CM

## 2024-12-10 DIAGNOSIS — R41.3 MEMORY DIFFICULTY: ICD-10-CM

## 2024-12-10 DIAGNOSIS — Z00.00 MEDICARE ANNUAL WELLNESS VISIT, SUBSEQUENT: Primary | ICD-10-CM

## 2024-12-10 DIAGNOSIS — Z00.00 ENCOUNTER FOR PREVENTIVE HEALTH EXAMINATION: ICD-10-CM

## 2024-12-10 DIAGNOSIS — R73.09 ELEVATED HEMOGLOBIN A1C: ICD-10-CM

## 2024-12-10 DIAGNOSIS — I10 PRIMARY HYPERTENSION: ICD-10-CM

## 2024-12-10 DIAGNOSIS — R41.843 PSYCHOMOTOR DEFICIT: ICD-10-CM

## 2024-12-10 DIAGNOSIS — G91.2 NORMAL PRESSURE HYDROCEPHALUS (MULTI): ICD-10-CM

## 2024-12-10 DIAGNOSIS — Z11.59 ENCOUNTER FOR HEPATITIS C SCREENING TEST FOR LOW RISK PATIENT: ICD-10-CM

## 2024-12-10 DIAGNOSIS — E78.2 MIXED HYPERLIPIDEMIA: ICD-10-CM

## 2024-12-10 LAB — POC HEMOGLOBIN A1C: 8.2 % (ref 4.2–6.5)

## 2024-12-10 PROCEDURE — 99214 OFFICE O/P EST MOD 30 MIN: CPT

## 2024-12-10 PROCEDURE — 4010F ACE/ARB THERAPY RXD/TAKEN: CPT

## 2024-12-10 PROCEDURE — G0439 PPPS, SUBSEQ VISIT: HCPCS

## 2024-12-10 PROCEDURE — 3050F LDL-C >= 130 MG/DL: CPT

## 2024-12-10 PROCEDURE — 1036F TOBACCO NON-USER: CPT

## 2024-12-10 PROCEDURE — 3008F BODY MASS INDEX DOCD: CPT

## 2024-12-10 PROCEDURE — 3044F HG A1C LEVEL LT 7.0%: CPT

## 2024-12-10 PROCEDURE — 83036 HEMOGLOBIN GLYCOSYLATED A1C: CPT

## 2024-12-10 PROCEDURE — 3075F SYST BP GE 130 - 139MM HG: CPT

## 2024-12-10 PROCEDURE — 3079F DIAST BP 80-89 MM HG: CPT

## 2024-12-10 PROCEDURE — 99397 PER PM REEVAL EST PAT 65+ YR: CPT

## 2024-12-10 RX ORDER — IBUPROFEN 200 MG
1 CAPSULE ORAL 2 TIMES DAILY
Qty: 60 STRIP | Refills: 3 | Status: SHIPPED | OUTPATIENT
Start: 2024-12-10 | End: 2025-01-09

## 2024-12-10 ASSESSMENT — PATIENT HEALTH QUESTIONNAIRE - PHQ9
SUM OF ALL RESPONSES TO PHQ9 QUESTIONS 1 AND 2: 2
1. LITTLE INTEREST OR PLEASURE IN DOING THINGS: SEVERAL DAYS
2. FEELING DOWN, DEPRESSED OR HOPELESS: SEVERAL DAYS

## 2024-12-10 NOTE — PATIENT INSTRUCTIONS
Please make a follow up visit in 4-6 weeks for your blood pressure and diabetes.     Blood pressure instructions:   You had a mildly elevated blood pressure in the office today. Please check your blood pressure at home daily for one week.     Sit down for 10-15 minutes with your feet on the floor and take your blood pressure with your arm straight. Take it twice in the morning 5 minutes apart. Then twice in the evening 5 minutes apart. Keep a log of blood pressures and bring them to next appointment.     Diabetes instructions:    You had an elevated hemoglobin A1c test today. Please monitor your blood sugars at home daily. A monitor and glucose test strips were sent to your pharmacy for pickup. Bring a log at your next appointment of your morning fasting sugars.

## 2024-12-10 NOTE — ASSESSMENT & PLAN NOTE
Managed currently on losartan 100mg and amlodipine 5 mg   Continue this regimen and take at home   Return in 4-6 weeks for BP check with BP log  Consider changing to olmesartan if still on the high side.

## 2024-12-10 NOTE — PROGRESS NOTES
Subjective   Patient ID: Vee Pimentel is a 74 y.o. female who presents for Establish Care.    HPI  Sleep: yes and no  Working on a making diet changes.  Working on adding regular exercise to their routine.  Due for colon cancer screening. Declines at this time. [facility]  Mammogram due  No longer gets cervical cancer screening. S/p hysterectomy   Vaccines are not up to date; they are due for: shingles, pneumococcal, rsv, flu/covid, Tdap. Declines all.   DEXA: declines, had osteopenia/osteoporosis in the past.   Dental exam is not up to date.  Vision exam is not up to date.    POCT A1c today was 8.2 which was significantly increased from last A1c check 4 months ago.     Other concerns addressed today include:     Just moved here from [place] in February 2024.    Wants to talk about medications today. Says she would like to do more lifestyle and diet intervention.     S/p  shunt for normal pressure  hydrocephalus in August 2024. Has had adjustments on it.     Does not use a walker or cane for gait disturbance. Has them but will just hold someone's hand to ambulate.     Review of Systems  All pertinent positive symptoms are included in the history of present illness.    All other systems have been reviewed and are negative and noncontributory to this patient's current ailments.     Social History     Tobacco Use    Smoking status: Never    Smokeless tobacco: Never   Substance Use Topics    Alcohol use: Not Currently     Comment: occassional      Past Surgical History:   Procedure Laterality Date    APPENDECTOMY      HERNIA REPAIR      LUMBAR PUNCTURE      TONSILLECTOMY        Allergies   Allergen Reactions    Azithromycin Angioedema    Metformin GI Upset    Penicillins Other    Amoxicillin Rash        Current Outpatient Medications   Medication Sig Dispense Refill    amLODIPine (Norvasc) 5 mg tablet Take 1 tablet (5 mg) by mouth once daily. 90 tablet 0    glimepiride (Amaryl) 2 mg tablet Take 1  tablet (2 mg) by mouth once daily in the morning. Take before meals. 90 tablet 0    losartan (Cozaar) 100 mg tablet TAKE 1 TABLET BY MOUTH EVERY DAY 90 tablet 0    rosuvastatin (Crestor) 20 mg tablet Take 1 tablet (20 mg) by mouth once daily. 90 tablet 0    blood sugar diagnostic (Blood Glucose Test) strip 1 each 2 times a day. 60 strip 3     No current facility-administered medications for this visit.        Patient Active Problem List   Diagnosis    Hyperlipidemia    Pneumonia due to infectious organism    Primary hypertension    Non-insulin dependent type 2 diabetes mellitus (Multi)    Dysuria    Urinary incontinence    Acute torn meniscus of knee, right, subsequent encounter    Memory difficulty    Psychomotor deficit    Gait disturbance    Urinary frequency    Urinary urgency    Cerebral ventriculomegaly    NPH (normal pressure hydrocephalus) (Multi)    Normal pressure hydrocephalus (Multi)    Encounter for preventive health examination        There is no immunization history on file for this patient.    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   SKIN - normal skin color and pigmentation, normal skin turgor without rash, lesions, or nodules visualized  HEAD - no trauma, normocephalic  EYES - pupils are equal and reactive to light, extraocular muscles are intact, and normal external exam  ENT - TM's intact, no injection, no signs of infection, uvula midline, normal tongue movement and throat normal, no exudate, nasal passage without discharge and patent  NECK - supple without rigidity, no neck mass was observed, no thyromegaly or thyroid nodules  RESPIRATORY - clear to auscultation, no wheezing, no crackles and no rales, good effort  CARDIAC - regular rate and regular rhythm, no skipped beats, no murmur  ABDOMEN - no organomegaly, soft,  nontender, nondistended, normal bowel sounds, no guarding/rebound/rigidity, negative McBurney sign and negative Wooten sign  EXTREMITIES - no edema, no deformities  NEUROLOGICAL - normal gait, normal balance, normal motor, no ataxia, DTRs equal and symmetrical; alert, oriented and no focal signs  PSYCHIATRIC - alert, pleasant and cordial, age-appropriate  IMMUNOLOGIC - no cervical lymphadenopathy     Recent Results (from the past 12 weeks)   POCT glycosylated hemoglobin (Hb A1C) manually resulted    Collection Time: 12/10/24  1:55 PM   Result Value Ref Range    POC HEMOGLOBIN A1c 8.2 (A) 4.2 - 6.5 %        Assessment/Plan   Problem List Items Addressed This Visit             ICD-10-CM    Hyperlipidemia E78.5     Repeat labs  Continue rosuvastatin 20mg          Primary hypertension I10     Managed currently on losartan 100mg and amlodipine 5 mg   Continue this regimen and take at home   Return in 4-6 weeks for BP check with BP log  Consider changing to olmesartan if still on the high side.          Non-insulin dependent type 2 diabetes mellitus (Multi) E11.9     A1c unmanaged in office today  Order home glucose monitor and test strips  Monitor at home   Return in 4-6 weeks for diabetes visit with home glucose log.         Relevant Medications    blood sugar diagnostic (Blood Glucose Test) strip    Other Relevant Orders    POCT glycosylated hemoglobin (Hb A1C) manually resulted (Completed)    Albumin-Creatinine Ratio, Urine Random    Home blood glucose meter    Urinary incontinence R32     Likely sequelae of NPH.   Continue monitoring  shunt.   Continue monitoring Diabetes.          Memory difficulty R41.3    Psychomotor deficit R41.843    Gait disturbance R26.9    Normal pressure hydrocephalus (Multi) G91.2     S/p  shunt.   Continue following with neurology.          Encounter for preventive health examination - Primary Z00.00    Relevant Orders    CBC    Comprehensive Metabolic Panel    Lipid Panel    TSH with  reflex to Free T4 if abnormal    Vitamin D 25-Hydroxy,Total (for eval of Vitamin D levels)     Other Visit Diagnoses         Codes    Encounter for hepatitis C screening test for low risk patient     Z11.59    Relevant Orders    Hepatitis C Antibody    BMI 35.0-35.9,adult     Z68.35    Relevant Orders    CBC    Vitamin D 25-Hydroxy,Total (for eval of Vitamin D levels)    Elevated hemoglobin A1c     R73.09    Relevant Medications    blood sugar diagnostic (Blood Glucose Test) strip    Other Relevant Orders    Home blood glucose meter          Follow up in 4-6 weeks with blood sugar and blood pressure logs for diabetes and htn.     Complete physical examination performed today. Recommended age appropriate vaccination and health maintenance screenings.      I have personally reviewed all available pertinent labs, imaging, and consult notes with the patient.      All questions and concerns were addressed. Patient verbalizes understanding instructions and agrees with established plan of care.      Patient seen and discussed with Dr. Dipti Mcpherson DO, MA   PGY-1 [facility]

## 2024-12-10 NOTE — ADDENDUM NOTE
Addended by: CAMRON SULTANA on: 12/10/2024 03:53 PM     Modules accepted: Orders, Level of Service

## 2024-12-10 NOTE — ASSESSMENT & PLAN NOTE
A1c unmanaged in office today  Order home glucose monitor and test strips  Monitor at home   Return in 4-6 weeks for diabetes visit with home glucose log.

## 2024-12-11 NOTE — PROGRESS NOTES
I reviewed and examined the patient. I was present for the key exam elements, and I fully participated in the patient's care. I discussed the management of the care with the resident. I have personally reviewed the pertinent labs and imaging, as well as recent notes, with the patient. I have reviewed the note above and agree with the resident's medical decision making as documented in the resident's note, in addition to the following comments / findings:     Agree with the rest of the plan outlined below by resident physician. No red flags.      The patient understands and agrees to the assessment and plan of care. Patient has also agreed to follow up and comply with the treatment and evaluation as recommended today. Patient was instructed to call the office at [phone] should questions arise regarding their treatment or care.     Smith Hawthorne DO, FAOASM  Family Medicine   [address], [address]  [place] [address]     Smith Hawthorne DO

## 2025-01-22 DIAGNOSIS — I10 PRIMARY HYPERTENSION: ICD-10-CM

## 2025-01-24 RX ORDER — LOSARTAN POTASSIUM 100 MG/1
100 TABLET ORAL DAILY
Qty: 14 TABLET | Refills: 0 | Status: SHIPPED | OUTPATIENT
Start: 2025-01-24 | End: 2025-02-07

## 2025-01-27 ENCOUNTER — APPOINTMENT (OUTPATIENT)
Dept: PRIMARY CARE | Facility: CLINIC | Age: 75
End: 2025-01-27
Payer: MEDICARE

## 2025-02-06 ENCOUNTER — APPOINTMENT (OUTPATIENT)
Dept: PRIMARY CARE | Facility: CLINIC | Age: 75
End: 2025-02-06

## 2025-02-06 VITALS
BODY MASS INDEX: 35.53 KG/M2 | HEIGHT: 60 IN | SYSTOLIC BLOOD PRESSURE: 124 MMHG | DIASTOLIC BLOOD PRESSURE: 76 MMHG | WEIGHT: 181 LBS | OXYGEN SATURATION: 97 % | HEART RATE: 112 BPM

## 2025-02-06 DIAGNOSIS — I10 PRIMARY HYPERTENSION: ICD-10-CM

## 2025-02-06 DIAGNOSIS — E78.2 MIXED HYPERLIPIDEMIA: ICD-10-CM

## 2025-02-06 DIAGNOSIS — R42 VERTIGO: Primary | ICD-10-CM

## 2025-02-06 DIAGNOSIS — E11.65 TYPE 2 DIABETES MELLITUS WITH HYPERGLYCEMIA, WITHOUT LONG-TERM CURRENT USE OF INSULIN: ICD-10-CM

## 2025-02-06 DIAGNOSIS — J32.9 CHRONIC SINUSITIS, UNSPECIFIED LOCATION: ICD-10-CM

## 2025-02-06 RX ORDER — AMLODIPINE BESYLATE 5 MG/1
5 TABLET ORAL DAILY
Qty: 90 TABLET | Refills: 0 | Status: SHIPPED | OUTPATIENT
Start: 2025-02-06

## 2025-02-06 RX ORDER — ROSUVASTATIN CALCIUM 20 MG/1
20 TABLET, COATED ORAL DAILY
Qty: 90 TABLET | Refills: 0 | Status: SHIPPED | OUTPATIENT
Start: 2025-02-06 | End: 2025-05-07

## 2025-02-06 RX ORDER — LOSARTAN POTASSIUM 100 MG/1
100 TABLET ORAL DAILY
Qty: 90 TABLET | Refills: 0 | Status: SHIPPED | OUTPATIENT
Start: 2025-02-06 | End: 2025-05-07

## 2025-02-06 RX ORDER — GLIMEPIRIDE 2 MG/1
2 TABLET ORAL
Qty: 90 TABLET | Refills: 0 | Status: SHIPPED | OUTPATIENT
Start: 2025-02-06

## 2025-02-06 RX ORDER — IBUPROFEN 200 MG
1 CAPSULE ORAL 2 TIMES DAILY
Qty: 100 STRIP | Refills: 1 | Status: SHIPPED | OUTPATIENT
Start: 2025-02-06

## 2025-02-06 ASSESSMENT — PATIENT HEALTH QUESTIONNAIRE - PHQ9
1. LITTLE INTEREST OR PLEASURE IN DOING THINGS: SEVERAL DAYS
SUM OF ALL RESPONSES TO PHQ9 QUESTIONS 1 AND 2: 2
2. FEELING DOWN, DEPRESSED OR HOPELESS: SEVERAL DAYS

## 2025-02-06 NOTE — PROGRESS NOTES
Subjective   Vee Pimentel is a 74 y.o. female who presents for Follow-up.    HPI    T2DM  - pt was seen in 12/2024 at which time her A1c was elevated to 8.2%, whereas it was 6.9% 5 months prior  - home BG log over the last two months shows persistently elevated BG in the 160's - 210's.   - current regimen is glimepiride 2mg  - pt states she was placed on Jardiance in the past which she self discontinued due to confusion about dosage    Chronic Sinus Congestion  Vertigo  - pt states she has a chronic history of sinus congestion and has been experiencing room spinning sensation for the past year or so   - she was unable to describe the quality or exacerbating/alleviating factors, or associated symptoms   - she does not take a daily antihistamine or nasal sprays; she declines these therapies     HLD  Crestor 20mg  Stable     HTN  Amlodipine 5mg  Stable     ROS:  All pertinent positive symptoms are included in the history of present illness.  All other systems have been reviewed and are negative and noncontributory to this patient's current ailments.    Objective     /76   Pulse (!) 112   Ht 1.524 m (5')   Wt 82.1 kg (181 lb)   SpO2 97%   BMI 35.35 kg/m²     CONSTITUTIONAL - well nourished, well developed, looks like stated age, in no acute distress, not ill-appearing, and not tired appearing  EYES - PERRL and EOMI with normal external exam  CHEST - clear to auscultation, no wheezing, no crackles and no rales  CARDIAC - regular rate and rhythm, no murmurs or skipped beats  EXTREMITIES - no edema, no deformities  IMMUNOLOGIC - no lymphadenopathy    Assessment/Plan   Problem List Items Addressed This Visit       Hyperlipidemia    Relevant Medications    rosuvastatin (Crestor) 20 mg tablet    Primary hypertension    Relevant Medications    losartan (Cozaar) 100 mg tablet    amLODIPine (Norvasc) 5 mg tablet     Other Visit Diagnoses       Vertigo    -  Primary    Relevant Orders    Referral to ENT    Type 2  diabetes mellitus with hyperglycemia, without long-term current use of insulin        Relevant Medications    glimepiride (Amaryl) 2 mg tablet    blood sugar diagnostic (Blood Glucose Test) strip    empagliflozin (Jardiance) 10 mg    Other Relevant Orders    Referral to Ophthalmology    Chronic sinusitis, unspecified location        Relevant Orders    Referral to ENT          Vee presents today for CDM     T2DM  - shared decision making facilitated to reintroduce Jardiance 10mg daily on top of current regimen for better glycemic control  - continue monitoring BG  - RTC in 3 mo at which time Jardiance can be increased to treatment dosage and glimepiride can be discontinued  - detailed return precautions provided  - ophthalmology referral placed for diabetic eye exam    HTN  HLD  - stable   - refills ordered     Chronic sinus congestion  Vertigo  - ENT referral placed     RTC in 3 mo for CDM    Gordy Bhandari DO  PGY-2 Family Medicine    Gordy Bhandari DO

## 2025-02-10 ENCOUNTER — APPOINTMENT (OUTPATIENT)
Dept: RADIOLOGY | Facility: HOSPITAL | Age: 75
End: 2025-02-10
Payer: MEDICARE

## 2025-02-10 ENCOUNTER — APPOINTMENT (OUTPATIENT)
Dept: CARDIOLOGY | Facility: HOSPITAL | Age: 75
End: 2025-02-10
Payer: MEDICARE

## 2025-02-10 ENCOUNTER — HOSPITAL ENCOUNTER (OUTPATIENT)
Facility: HOSPITAL | Age: 75
Setting detail: OBSERVATION
Discharge: HOME HEALTH | End: 2025-02-11
Attending: STUDENT IN AN ORGANIZED HEALTH CARE EDUCATION/TRAINING PROGRAM | Admitting: STUDENT IN AN ORGANIZED HEALTH CARE EDUCATION/TRAINING PROGRAM
Payer: MEDICARE

## 2025-02-10 DIAGNOSIS — I10 PRIMARY HYPERTENSION: ICD-10-CM

## 2025-02-10 DIAGNOSIS — R79.89 LACTATE BLOOD INCREASE: ICD-10-CM

## 2025-02-10 DIAGNOSIS — R41.843 PSYCHOMOTOR DEFICIT: ICD-10-CM

## 2025-02-10 DIAGNOSIS — R60.0 LEG EDEMA: ICD-10-CM

## 2025-02-10 DIAGNOSIS — E11.65 TYPE 2 DIABETES MELLITUS WITH HYPERGLYCEMIA, WITHOUT LONG-TERM CURRENT USE OF INSULIN: ICD-10-CM

## 2025-02-10 DIAGNOSIS — N17.9 ACUTE KIDNEY INJURY (CMS-HCC): ICD-10-CM

## 2025-02-10 DIAGNOSIS — J10.1 INFLUENZA A: Primary | ICD-10-CM

## 2025-02-10 DIAGNOSIS — G91.2 NORMAL PRESSURE HYDROCEPHALUS (MULTI): ICD-10-CM

## 2025-02-10 DIAGNOSIS — G91.2 NPH (NORMAL PRESSURE HYDROCEPHALUS) (MULTI): ICD-10-CM

## 2025-02-10 DIAGNOSIS — R41.3 MEMORY DIFFICULTY: ICD-10-CM

## 2025-02-10 LAB
ALBUMIN SERPL BCP-MCNC: 4.3 G/DL (ref 3.4–5)
ALP SERPL-CCNC: 53 U/L (ref 33–136)
ALT SERPL W P-5'-P-CCNC: 22 U/L (ref 7–45)
ANION GAP SERPL CALC-SCNC: 19 MMOL/L (ref 10–20)
APPEARANCE UR: CLEAR
AST SERPL W P-5'-P-CCNC: 33 U/L (ref 9–39)
BASOPHILS # BLD AUTO: 0.01 X10*3/UL (ref 0–0.1)
BASOPHILS NFR BLD AUTO: 0.2 %
BILIRUB SERPL-MCNC: 0.3 MG/DL (ref 0–1.2)
BILIRUB UR STRIP.AUTO-MCNC: NEGATIVE MG/DL
BUN SERPL-MCNC: 29 MG/DL (ref 6–23)
CALCIUM SERPL-MCNC: 9.1 MG/DL (ref 8.6–10.3)
CARDIAC TROPONIN I PNL SERPL HS: 11 NG/L (ref 0–13)
CHLORIDE SERPL-SCNC: 98 MMOL/L (ref 98–107)
CO2 SERPL-SCNC: 23 MMOL/L (ref 21–32)
COLOR UR: ABNORMAL
CREAT SERPL-MCNC: 1.22 MG/DL (ref 0.5–1.05)
EGFRCR SERPLBLD CKD-EPI 2021: 47 ML/MIN/1.73M*2
EOSINOPHIL # BLD AUTO: 0.01 X10*3/UL (ref 0–0.4)
EOSINOPHIL NFR BLD AUTO: 0.2 %
ERYTHROCYTE [DISTWIDTH] IN BLOOD BY AUTOMATED COUNT: 12.6 % (ref 11.5–14.5)
FLUAV RNA RESP QL NAA+PROBE: DETECTED
FLUBV RNA RESP QL NAA+PROBE: NOT DETECTED
GLUCOSE SERPL-MCNC: 225 MG/DL (ref 74–99)
GLUCOSE UR STRIP.AUTO-MCNC: NORMAL MG/DL
HCT VFR BLD AUTO: 44.9 % (ref 36–46)
HGB BLD-MCNC: 14.9 G/DL (ref 12–16)
HYALINE CASTS #/AREA URNS AUTO: ABNORMAL /LPF
IMM GRANULOCYTES # BLD AUTO: 0.02 X10*3/UL (ref 0–0.5)
IMM GRANULOCYTES NFR BLD AUTO: 0.4 % (ref 0–0.9)
INR PPP: 1.1 (ref 0.9–1.1)
KETONES UR STRIP.AUTO-MCNC: NEGATIVE MG/DL
LACTATE SERPL-SCNC: 1.3 MMOL/L (ref 0.4–2)
LACTATE SERPL-SCNC: 2.3 MMOL/L (ref 0.4–2)
LEUKOCYTE ESTERASE UR QL STRIP.AUTO: ABNORMAL
LYMPHOCYTES # BLD AUTO: 1.47 X10*3/UL (ref 0.8–3)
LYMPHOCYTES NFR BLD AUTO: 29.6 %
MAGNESIUM SERPL-MCNC: 2.02 MG/DL (ref 1.6–2.4)
MCH RBC QN AUTO: 30.3 PG (ref 26–34)
MCHC RBC AUTO-ENTMCNC: 33.2 G/DL (ref 32–36)
MCV RBC AUTO: 91 FL (ref 80–100)
MONOCYTES # BLD AUTO: 0.74 X10*3/UL (ref 0.05–0.8)
MONOCYTES NFR BLD AUTO: 14.9 %
MUCOUS THREADS #/AREA URNS AUTO: ABNORMAL /LPF
NEUTROPHILS # BLD AUTO: 2.71 X10*3/UL (ref 1.6–5.5)
NEUTROPHILS NFR BLD AUTO: 54.7 %
NITRITE UR QL STRIP.AUTO: NEGATIVE
NRBC BLD-RTO: 0 /100 WBCS (ref 0–0)
PH UR STRIP.AUTO: 5.5 [PH]
PLATELET # BLD AUTO: 196 X10*3/UL (ref 150–450)
POTASSIUM SERPL-SCNC: 3.5 MMOL/L (ref 3.5–5.3)
PROT SERPL-MCNC: 8 G/DL (ref 6.4–8.2)
PROT UR STRIP.AUTO-MCNC: NEGATIVE MG/DL
PROTHROMBIN TIME: 12.4 SECONDS (ref 9.8–12.8)
RBC # BLD AUTO: 4.92 X10*6/UL (ref 4–5.2)
RBC # UR STRIP.AUTO: ABNORMAL MG/DL
RBC #/AREA URNS AUTO: ABNORMAL /HPF
SARS-COV-2 RNA RESP QL NAA+PROBE: NOT DETECTED
SODIUM SERPL-SCNC: 136 MMOL/L (ref 136–145)
SP GR UR STRIP.AUTO: 1.02
UROBILINOGEN UR STRIP.AUTO-MCNC: NORMAL MG/DL
WBC # BLD AUTO: 5 X10*3/UL (ref 4.4–11.3)
WBC #/AREA URNS AUTO: ABNORMAL /HPF

## 2025-02-10 PROCEDURE — 74018 RADEX ABDOMEN 1 VIEW: CPT

## 2025-02-10 PROCEDURE — 2500000004 HC RX 250 GENERAL PHARMACY W/ HCPCS (ALT 636 FOR OP/ED)

## 2025-02-10 PROCEDURE — 2500000004 HC RX 250 GENERAL PHARMACY W/ HCPCS (ALT 636 FOR OP/ED): Performed by: NURSE PRACTITIONER

## 2025-02-10 PROCEDURE — 70450 CT HEAD/BRAIN W/O DYE: CPT

## 2025-02-10 PROCEDURE — 96361 HYDRATE IV INFUSION ADD-ON: CPT

## 2025-02-10 PROCEDURE — 2500000002 HC RX 250 W HCPCS SELF ADMINISTERED DRUGS (ALT 637 FOR MEDICARE OP, ALT 636 FOR OP/ED): Mod: MUE

## 2025-02-10 PROCEDURE — 87086 URINE CULTURE/COLONY COUNT: CPT | Mod: GEALAB | Performed by: NURSE PRACTITIONER

## 2025-02-10 PROCEDURE — 96360 HYDRATION IV INFUSION INIT: CPT

## 2025-02-10 PROCEDURE — 2500000001 HC RX 250 WO HCPCS SELF ADMINISTERED DRUGS (ALT 637 FOR MEDICARE OP): Performed by: NURSE PRACTITIONER

## 2025-02-10 PROCEDURE — 83605 ASSAY OF LACTIC ACID: CPT | Performed by: NURSE PRACTITIONER

## 2025-02-10 PROCEDURE — 80053 COMPREHEN METABOLIC PANEL: CPT | Performed by: NURSE PRACTITIONER

## 2025-02-10 PROCEDURE — 36415 COLL VENOUS BLD VENIPUNCTURE: CPT | Performed by: NURSE PRACTITIONER

## 2025-02-10 PROCEDURE — 71046 X-RAY EXAM CHEST 2 VIEWS: CPT

## 2025-02-10 PROCEDURE — 84484 ASSAY OF TROPONIN QUANT: CPT | Performed by: NURSE PRACTITIONER

## 2025-02-10 PROCEDURE — 85025 COMPLETE CBC W/AUTO DIFF WBC: CPT | Performed by: NURSE PRACTITIONER

## 2025-02-10 PROCEDURE — 2500000002 HC RX 250 W HCPCS SELF ADMINISTERED DRUGS (ALT 637 FOR MEDICARE OP, ALT 636 FOR OP/ED): Performed by: NURSE PRACTITIONER

## 2025-02-10 PROCEDURE — 93005 ELECTROCARDIOGRAM TRACING: CPT

## 2025-02-10 PROCEDURE — 99223 1ST HOSP IP/OBS HIGH 75: CPT

## 2025-02-10 PROCEDURE — 83735 ASSAY OF MAGNESIUM: CPT | Performed by: NURSE PRACTITIONER

## 2025-02-10 PROCEDURE — 74176 CT ABD & PELVIS W/O CONTRAST: CPT

## 2025-02-10 PROCEDURE — 81001 URINALYSIS AUTO W/SCOPE: CPT | Performed by: NURSE PRACTITIONER

## 2025-02-10 PROCEDURE — 85610 PROTHROMBIN TIME: CPT | Performed by: NURSE PRACTITIONER

## 2025-02-10 PROCEDURE — 99285 EMERGENCY DEPT VISIT HI MDM: CPT | Mod: 25 | Performed by: STUDENT IN AN ORGANIZED HEALTH CARE EDUCATION/TRAINING PROGRAM

## 2025-02-10 PROCEDURE — 96372 THER/PROPH/DIAG INJ SC/IM: CPT | Mod: 59

## 2025-02-10 PROCEDURE — 71045 X-RAY EXAM CHEST 1 VIEW: CPT

## 2025-02-10 PROCEDURE — 87636 SARSCOV2 & INF A&B AMP PRB: CPT | Performed by: NURSE PRACTITIONER

## 2025-02-10 PROCEDURE — 70250 X-RAY EXAM OF SKULL: CPT

## 2025-02-10 RX ORDER — ACETAMINOPHEN 325 MG/1
650 TABLET ORAL EVERY 4 HOURS PRN
Status: DISCONTINUED | OUTPATIENT
Start: 2025-02-10 | End: 2025-02-11 | Stop reason: HOSPADM

## 2025-02-10 RX ORDER — GUAIFENESIN 600 MG/1
600 TABLET, EXTENDED RELEASE ORAL 2 TIMES DAILY
Status: DISCONTINUED | OUTPATIENT
Start: 2025-02-10 | End: 2025-02-11 | Stop reason: HOSPADM

## 2025-02-10 RX ORDER — HEPARIN SODIUM 5000 [USP'U]/ML
5000 INJECTION, SOLUTION INTRAVENOUS; SUBCUTANEOUS EVERY 8 HOURS
Status: DISCONTINUED | OUTPATIENT
Start: 2025-02-10 | End: 2025-02-11 | Stop reason: HOSPADM

## 2025-02-10 RX ORDER — ACETAMINOPHEN 160 MG/5ML
650 SOLUTION ORAL EVERY 4 HOURS PRN
Status: DISCONTINUED | OUTPATIENT
Start: 2025-02-10 | End: 2025-02-11 | Stop reason: HOSPADM

## 2025-02-10 RX ORDER — ROSUVASTATIN CALCIUM 20 MG/1
20 TABLET, COATED ORAL DAILY
Status: DISCONTINUED | OUTPATIENT
Start: 2025-02-10 | End: 2025-02-11 | Stop reason: HOSPADM

## 2025-02-10 RX ORDER — AMLODIPINE BESYLATE 5 MG/1
5 TABLET ORAL DAILY
Status: DISCONTINUED | OUTPATIENT
Start: 2025-02-10 | End: 2025-02-11 | Stop reason: HOSPADM

## 2025-02-10 RX ORDER — ONDANSETRON HYDROCHLORIDE 2 MG/ML
4 INJECTION, SOLUTION INTRAVENOUS ONCE
Status: DISCONTINUED | OUTPATIENT
Start: 2025-02-10 | End: 2025-02-11 | Stop reason: HOSPADM

## 2025-02-10 RX ORDER — LOSARTAN POTASSIUM 50 MG/1
100 TABLET ORAL DAILY
Status: DISCONTINUED | OUTPATIENT
Start: 2025-02-10 | End: 2025-02-11 | Stop reason: HOSPADM

## 2025-02-10 RX ORDER — MECLIZINE HYDROCHLORIDE 25 MG/1
25 TABLET ORAL ONCE
Status: COMPLETED | OUTPATIENT
Start: 2025-02-10 | End: 2025-02-10

## 2025-02-10 RX ORDER — EAR PLUGS
1 EACH OTIC (EAR) 3 TIMES DAILY
Status: DISCONTINUED | OUTPATIENT
Start: 2025-02-10 | End: 2025-02-11 | Stop reason: HOSPADM

## 2025-02-10 RX ORDER — OSELTAMIVIR PHOSPHATE 30 MG/1
30 CAPSULE ORAL 2 TIMES DAILY
Status: DISCONTINUED | OUTPATIENT
Start: 2025-02-11 | End: 2025-02-11 | Stop reason: HOSPADM

## 2025-02-10 RX ORDER — ONDANSETRON HYDROCHLORIDE 2 MG/ML
4 INJECTION, SOLUTION INTRAVENOUS EVERY 8 HOURS PRN
Status: DISCONTINUED | OUTPATIENT
Start: 2025-02-10 | End: 2025-02-11 | Stop reason: HOSPADM

## 2025-02-10 RX ORDER — ACETAMINOPHEN 325 MG/1
975 TABLET ORAL ONCE
Status: COMPLETED | OUTPATIENT
Start: 2025-02-10 | End: 2025-02-10

## 2025-02-10 RX ORDER — ACETAMINOPHEN 650 MG/1
650 SUPPOSITORY RECTAL EVERY 4 HOURS PRN
Status: DISCONTINUED | OUTPATIENT
Start: 2025-02-10 | End: 2025-02-11 | Stop reason: HOSPADM

## 2025-02-10 RX ORDER — OSELTAMIVIR PHOSPHATE 75 MG/1
75 CAPSULE ORAL ONCE
Status: COMPLETED | OUTPATIENT
Start: 2025-02-10 | End: 2025-02-10

## 2025-02-10 RX ORDER — INSULIN LISPRO 100 [IU]/ML
0-10 INJECTION, SOLUTION INTRAVENOUS; SUBCUTANEOUS
Status: DISCONTINUED | OUTPATIENT
Start: 2025-02-11 | End: 2025-02-11 | Stop reason: HOSPADM

## 2025-02-10 RX ORDER — GLIMEPIRIDE 4 MG/1
2 TABLET ORAL
Status: DISCONTINUED | OUTPATIENT
Start: 2025-02-11 | End: 2025-02-11 | Stop reason: HOSPADM

## 2025-02-10 RX ORDER — ONDANSETRON 4 MG/1
4 TABLET, FILM COATED ORAL EVERY 8 HOURS PRN
Status: DISCONTINUED | OUTPATIENT
Start: 2025-02-10 | End: 2025-02-11 | Stop reason: HOSPADM

## 2025-02-10 RX ADMIN — SODIUM CHLORIDE, POTASSIUM CHLORIDE, SODIUM LACTATE AND CALCIUM CHLORIDE 1000 ML: 600; 310; 30; 20 INJECTION, SOLUTION INTRAVENOUS at 20:15

## 2025-02-10 RX ADMIN — SODIUM CHLORIDE 500 ML: 9 INJECTION, SOLUTION INTRAVENOUS at 15:11

## 2025-02-10 RX ADMIN — GUAIFENESIN 600 MG: 600 TABLET ORAL at 20:21

## 2025-02-10 RX ADMIN — MECLIZINE HYDROCHLORIDE 25 MG: 25 TABLET ORAL at 16:52

## 2025-02-10 RX ADMIN — HEPARIN SODIUM 5000 UNITS: 5000 INJECTION, SOLUTION INTRAVENOUS; SUBCUTANEOUS at 20:15

## 2025-02-10 RX ADMIN — ACETAMINOPHEN 975 MG: 325 TABLET, FILM COATED ORAL at 15:11

## 2025-02-10 RX ADMIN — ROSUVASTATIN CALCIUM 20 MG: 20 TABLET, FILM COATED ORAL at 20:14

## 2025-02-10 RX ADMIN — OSELTAMAVIR PHOSPHATE 75 MG: 75 CAPSULE ORAL at 20:14

## 2025-02-10 RX ADMIN — SODIUM CHLORIDE 500 ML: 9 INJECTION, SOLUTION INTRAVENOUS at 16:52

## 2025-02-10 ASSESSMENT — PAIN SCALES - GENERAL
PAINLEVEL_OUTOF10: 0 - NO PAIN
PAINLEVEL_OUTOF10: 0 - NO PAIN

## 2025-02-10 ASSESSMENT — COLUMBIA-SUICIDE SEVERITY RATING SCALE - C-SSRS
1. IN THE PAST MONTH, HAVE YOU WISHED YOU WERE DEAD OR WISHED YOU COULD GO TO SLEEP AND NOT WAKE UP?: NO
6. HAVE YOU EVER DONE ANYTHING, STARTED TO DO ANYTHING, OR PREPARED TO DO ANYTHING TO END YOUR LIFE?: NO
2. HAVE YOU ACTUALLY HAD ANY THOUGHTS OF KILLING YOURSELF?: NO

## 2025-02-10 ASSESSMENT — PAIN - FUNCTIONAL ASSESSMENT: PAIN_FUNCTIONAL_ASSESSMENT: 0-10

## 2025-02-10 NOTE — H&P
Patient: Vee Pimentel   Age: 74 y.o.   Gender: female   Room/Bed: AC05/AC05     Attending: Amber Malone DO  Code Status:  Full Code    Chief Complaint:  Flu Symptoms        History of Presenting Illness  Vee Pimentel is a 74 y.o. female with a PMH of NPH (s/p  shunt placement), HLD, HTN, vertigo, T2D, chronic sinusitis, HFpEF, MARISELA, glaucoma who presented for abdominal pain, diarrhea, cough, and headache.  She has a several month history of profuse diarrhea and stool incontinence.  She describes frequent wiping with inability to stop flow of stool.  She is concerned about stool contamination potentially in her urinary tract or other orifices.    She has a 2 to 3-day history of worsening upper respiratory symptoms and productive cough.  She has had no fevers but is experiencing chills.  She has had poor oral food and water intake.  In the ED she is found to be positive for influenza A.    She has a history of normal pressure hydrocephalus and vertigo with  shunt placed in July 2024.  She initially complained of vertigo and headache in the ED, which resolved with acetaminophen and bolus of IV fluids.    She recently moved up from [place] to be closer to her partner, who is now in a nursing home.  She is accompanied by sister-in-law and brother-in-law, who are her closest family.    Of note, she uses large amounts of vitamins and supplements and prefers these over prescribed medications.    Past Medical History   Past Medical History:   Diagnosis Date    Anxiety     Arthritis     Basal cell carcinoma of leg, left     Cataract     Depression     Glaucoma     Hyperlipidemia     Hypertension     NPH (normal pressure hydrocephalus) (Multi)     Obesity     Sleep apnea     Type 2 diabetes mellitus     Urinary incontinence     Urinary retention     Vision loss     left eye fibrosis      Past Surgical History:   Past Surgical History:   Procedure Laterality Date    APPENDECTOMY      HERNIA REPAIR      LUMBAR PUNCTURE    "   TONSILLECTOMY       Family History:   Family History   Problem Relation Name Age of Onset    Dementia Mother      Heart failure Mother      Fibromyalgia Mother      Other (ABDOMINAL ANER) Father       Social History:   Tobacco Use: Low Risk  (2/10/2025)    Patient History     Smoking Tobacco Use: Never     Smokeless Tobacco Use: Never     Passive Exposure: Not on file     Social History     Substance and Sexual Activity   Alcohol Use Not Currently    Comment: occassional        ED Course   Vitals - /59   Pulse 69   Temp 36.6 °C (97.9 °F)   Resp 19   Wt 81.6 kg (180 lb)   SpO2 95%     Labs:   CBC:  Recent Labs     02/10/25  1507 07/30/24  1617 07/19/24  0826   WBC 5.0 11.0 7.2   HGB 14.9 12.4 13.4   HCT 44.9 38.2 41.3    230 228   MCV 91 93 92     CMP:  Recent Labs     02/10/25  1507 07/30/24  1617 07/19/24  0826    140 140   K 3.5 3.8 4.7   CL 98 104 102   CO2 23 26 28   ANIONGAP 19 14 15   BUN 29* 13 15   CREATININE 1.22* 0.57 0.66   EGFR 47* >90 >90   GLUCOSE 225* 144* 232*     Recent Labs     02/10/25  1507 07/30/24  1617 02/21/24  1548   ALBUMIN 4.3 4.0 3.6   ALKPHOS 53  --  76   ALT 22  --  13   AST 33  --  13   BILITOT 0.3  --  0.4     Calcium/Phos:   Lab Results   Component Value Date    CALCIUM 9.1 02/10/2025    PHOS 4.3 07/30/2024      COAG:   Recent Labs     02/10/25  1507 07/30/24  1617 06/17/24  1555   INR 1.1 1.0 1.0     CRP: No results found for: \"CRP\"   [unfilled]   ENDO:  Recent Labs     12/10/24  1355 07/19/24  0826 02/21/24  1548   TSH  --   --  1.10   HGBA1C 8.2* 6.9* 9.0*      CARDIAC:   Recent Labs     02/10/25  1507   TROPHS 11     Recent Labs     02/21/24  1548   CHOL 265*   LDLCALC 169*   HDL 49.3   TRIG 232*     No data recorded    Micro/ID:   No results found for the last 90 days.                   No lab exists for component: \"AGALPCRNB\"   .ID  Lab Results   Component Value Date    URINECULTURE No significant growth 07/19/2024    CSFCULTSMEAR No growth " aerobically and anaerobically 06/19/2024       Imaging:   No results found for this or any previous visit (from the past 4464 hours).  CT chest abdomen pelvis wo IV contrast    Result Date: 2/10/2025  Interpreted By:  Phyllis Chatman, STUDY: CT CHEST ABDOMEN PELVIS WO CONTRAST;  2/10/2025 3:30 pm   INDICATION: Signs/Symptoms:Hypoxia, abdominal pain, cough.     COMPARISON: None.   ACCESSION NUMBER(S): [id number]   ORDERING CLINICIAN: EULA LOUISE   TECHNIQUE: CT of the chest, abdomen and pelvis was performed. Contiguous axial images were obtained at 3 mm slice thickness through the chest, abdomen and pelvis. Coronal and sagittal reconstructions at 3 mm slice thickness were performed.  No intravenous or oral contrast was administered.   FINDINGS: Please note that the study is limited without intravenous contrast.   CHEST:     LUNG/PLEURA/LARGE AIRWAYS: Trachea and mainstem bronchi are patent with no endoluminal mass. No focal pneumonia is identified. There is no pleural effusion. There are no suspicious pulmonary nodules.   VESSELS: Aorta is normal in size with mild vascular calcification noted throughout the aorta. Main pulmonary artery is upper normal in size bordering on 3 cm diameter. Superior vena cava is normal in size.   HEART: The heart is normal in size. A small amount of pericardial fluid is present.   MEDIASTINUM AND CELENA: There are no pathologically enlarged mediastinal or hilar lymph nodes. Esophagus is decompressed with no wall thickening noted.   CHEST WALL AND LOWER NECK: Thyroid gland is nonenlarged with no gross nodule noted. Ventricular shunt tubing is visible anteriorly superficial to the right sternocleidomastoid muscle and coursing in the subcutaneous fat in the midline of the chest. Tubing enters the abdomen left upper quadrant along the medial margin of the rectus muscle.   ABDOMEN:   LIVER: The liver is normal in size without evidence of focal liver lesions.   BILE DUCTS: There is no  biliary ductal dilatation.   GALLBLADDER: Gallbladder appears normal for the nonfasting state. No calcified stones are noted.   PANCREAS: The pancreas appears unremarkable.   SPLEEN: Spleen is normal in size with no focal mass.   ADRENAL GLANDS: Right adrenal gland contains a 2.8 cm long axis nodule with density less than 10 Hounsfield units and left adrenal contains a 1.6 cm long axis nodule with density less than 10 Hounsfield units. These are compatible with adenomas.   KIDNEYS AND URETERS: No stones or hydronephrosis are present. No gross renal mass is identified.   PELVIS:   BLADDER: The urinary bladder appears within normal limits.   REPRODUCTIVE ORGANS: Uterus is absent.   BOWEL: Stomach contains food material and is nondilated. Small bowel loops are nondilated. Surgical clips are noted at tip of the cecum from appendectomy. Colon is nondilated. Shunt tubing projects in the peritoneal space anterior to the sigmoid colon.   VESSELS: Aorta and iliac arteries are diffusely atherosclerotic. There is no aneurysmal dilatation. Vena cava is normal in size.   PERITONEUM/RETROPERITONEUM/LYMPH NODES: There is no free intraperitoneal air. There is no fluid around the peritoneal shunt tubing. There is no pneumoperitoneum. No mesenteric inflammation is identified. There are no pathologically enlarged retroperitoneal lymph nodes.   ABDOMINAL WALL: Fat containing umbilical hernia has hernia sac diameter 4.7 cm.   BONES: Diffuse degenerative changes are present through the spine. No acute or suspicious abnormality is noted.       Chest 1.  No pneumonia is identified.   Abdomen-Pelvis 1.  Ventriculoperitoneal shunt tubing terminates in the left lower quadrant of the abdomen with no surrounding fluid collection noted. 2. Fat containing umbilical hernia is noted 3. Bilateral adrenal adenomas 4. No acute inflammatory abnormality     MACRO: None   Signed by: Phyllis Chatman 2/10/2025 4:24 PM Dictation workstation:    [id number]    XR chest 2 views    Result Date: 2/10/2025  Interpreted By:  Phyllis Chatman, STUDY: XR CHEST 2 VIEWS;  2/10/2025 3:52 pm   INDICATION: Signs/Symptoms:Productive cough and weakness.     COMPARISON: 02/21/2024   ACCESSION NUMBER(S): [id number]   ORDERING CLINICIAN: EULA LOUISE   FINDINGS: AP supine and sitting lateral views of the chest were obtained.       CARDIOMEDIASTINAL SILHOUETTE: Cardiomediastinal silhouette is normal in size and configuration. Aorta is atherosclerotic.   LUNGS: Lungs are clear.   ABDOMEN: No remarkable upper abdominal findings.   BONES: No acute osseous changes.   Shunt tubing coursing right side of neck and midline anteriorly in the chest wall is new compared to the prior exam.       1.  No evidence of acute cardiopulmonary process. 2. Shunt tubing in the anterior chest is new compared to the prior chest x-ray.       MACRO: None   Signed by: Phyllis Chatman 2/10/2025 4:06 PM Dictation workstation:   [id number]    XR shunt series    Result Date: 2/10/2025  Interpreted By:  Phyllis Chatman, STUDY: XR SHUNT SERIES; ;  2/10/2025 3:52 pm   INDICATION: Signs/Symptoms:Headache with history of shunt placed last July 29.     COMPARISON: CT head performed same day   ACCESSION NUMBER(S): [id number]   ORDERING CLINICIAN: EULA LOUISE   FINDINGS: AP and lateral views of the skull and AP views of the neck, chest and abdomen were obtained. Intraventricular shunt enters through the right frontal bone and the tip projects to the left of midline superior to the level of the sella turcica. CT head documents the tip position near the level of the left foramen of Monro.   Extracranial portion of the shunt passes posterior to the right ear, along the right side of the neck and midline along the chest. In the abdomen the tubing is curved in the left side of the abdomen with the tip in the left lower quadrant. There are no kinks or discontinuities noted in the tubing. Within the abdomen  there is no displaced bowel in the region of the tip of the tubing to suggest a localized fluid collection.   Lungs are grossly clear. Heart appears within normal range for the projection. Bowel gas pattern is nonobstructive with patchy small bowel and colonic air. Degenerative changes are noted throughout the spine.       No discontinuity or kinking is noted in the ventricular shunt tubing     MACRO: None   Signed by: Phyllis Chatman 2/10/2025 4:03 PM Dictation workstation:   [id number]    CT head wo IV contrast    Result Date: 2/10/2025  Interpreted By:  Phyllis Chatman, STUDY: CT HEAD WO IV CONTRAST;  2/10/2025 3:30 pm   INDICATION: Signs/Symptoms:Severe headache with history of shunt.     COMPARISON: 07/30/2024   ACCESSION NUMBER(S): [id number]   ORDERING CLINICIAN: EULA LOUISE   TECHNIQUE: Noncontrast axial CT scan of head was performed. Angled reformats in brain and bone windows and coronal and sagittal reformats in brain window were generated.   FINDINGS: CSF Spaces: Ventricular shunt enters through the right frontal bone in the tip of the shunt extends through the frontal horn of the right lateral ventricle with the tip projecting near the left foramen of Monro, appearing to cross the lower portion of the septum pellucidum. Position is unchanged. Ventricles are stable in size. There is no extraaxial fluid collection.   Parenchyma:  The grey-white differentiation is intact. There is no mass effect or midline shift.  There is no intracranial hemorrhage.   Calvarium: Hyperostosis frontalis interna is unchanged.   Paranasal sinuses and mastoids: Visualized paranasal sinuses and mastoids are clear.       No evidence of acute cortical infarct or intracranial hemorrhage.   Intraventricular shunt is unchanged in position and ventricular sizes are stable.   MACRO: None.   Signed by: Phyllis Chatman 2/10/2025 4:00 PM Dictation workstation:   [id number]     Interventions:  Medications   ondansetron (Zofran)  injection 4 mg (4 mg intravenous Not Given 2/10/25 1513)   heparin (porcine) injection 5,000 Units (has no administration in time range)   acetaminophen (Tylenol) tablet 650 mg (has no administration in time range)     Or   acetaminophen (Tylenol) oral liquid 650 mg (has no administration in time range)     Or   acetaminophen (Tylenol) suppository 650 mg (has no administration in time range)   ondansetron (Zofran) tablet 4 mg (has no administration in time range)     Or   ondansetron (Zofran) injection 4 mg (has no administration in time range)   insulin lispro injection 0-10 Units (has no administration in time range)   oseltamivir (Tamiflu) capsule 75 mg (has no administration in time range)   oseltamivir (Tamiflu) capsule 30 mg (has no administration in time range)   guaiFENesin (Mucinex) 12 hr tablet 600 mg (has no administration in time range)   acetaminophen (Tylenol) tablet 975 mg (975 mg oral Given 2/10/25 1511)   sodium chloride 0.9 % bolus 500 mL (0 mL intravenous Stopped 2/10/25 1543)   meclizine (Antivert) tablet 25 mg (25 mg oral Given 2/10/25 1652)   sodium chloride 0.9 % bolus 500 mL (0 mL intravenous Stopped 2/10/25 1733)       Images:  CT chest abdomen pelvis wo IV contrast  Narrative: Interpreted By:  Phyllis Chatman,   STUDY:  CT CHEST ABDOMEN PELVIS WO CONTRAST;  2/10/2025 3:30 pm      INDICATION:  Signs/Symptoms:Hypoxia, abdominal pain, cough.          COMPARISON:  None.      ACCESSION NUMBER(S):  [id number]      ORDERING CLINICIAN:  EULA LOUISE      TECHNIQUE:  CT of the chest, abdomen and pelvis was performed. Contiguous axial  images were obtained at 3 mm slice thickness through the chest,  abdomen and pelvis. Coronal and sagittal reconstructions at 3 mm  slice thickness were performed.  No intravenous or oral contrast was  administered.      FINDINGS:  Please note that the study is limited without intravenous contrast.      CHEST:          LUNG/PLEURA/LARGE AIRWAYS:  Trachea and  mainstem bronchi are patent with no endoluminal mass. No  focal pneumonia is identified. There is no pleural effusion. There  are no suspicious pulmonary nodules.      VESSELS:  Aorta is normal in size with mild vascular calcification noted  throughout the aorta. Main pulmonary artery is upper normal in size  bordering on 3 cm diameter. Superior vena cava is normal in size.      HEART:  The heart is normal in size. A small amount of pericardial fluid is  present.      MEDIASTINUM AND CELENA:  There are no pathologically enlarged mediastinal or hilar lymph  nodes. Esophagus is decompressed with no wall thickening noted.      CHEST WALL AND LOWER NECK:  Thyroid gland is nonenlarged with no gross nodule noted. Ventricular  shunt tubing is visible anteriorly superficial to the right  sternocleidomastoid muscle and coursing in the subcutaneous fat in  the midline of the chest. Tubing enters the abdomen left upper  quadrant along the medial margin of the rectus muscle.      ABDOMEN:      LIVER:  The liver is normal in size without evidence of focal liver lesions.      BILE DUCTS:  There is no biliary ductal dilatation.      GALLBLADDER:  Gallbladder appears normal for the nonfasting state. No calcified  stones are noted.      PANCREAS:  The pancreas appears unremarkable.      SPLEEN:  Spleen is normal in size with no focal mass.      ADRENAL GLANDS:  Right adrenal gland contains a 2.8 cm long axis nodule with density  less than 10 Hounsfield units and left adrenal contains a 1.6 cm long  axis nodule with density less than 10 Hounsfield units. These are  compatible with adenomas.      KIDNEYS AND URETERS:  No stones or hydronephrosis are present. No gross renal mass is  identified.      PELVIS:      BLADDER:  The urinary bladder appears within normal limits.      REPRODUCTIVE ORGANS:  Uterus is absent.      BOWEL:  Stomach contains food material and is nondilated. Small bowel loops  are nondilated. Surgical clips are noted  at tip of the cecum from  appendectomy. Colon is nondilated. Shunt tubing projects in the  peritoneal space anterior to the sigmoid colon.      VESSELS:  Aorta and iliac arteries are diffusely atherosclerotic. There is no  aneurysmal dilatation. Vena cava is normal in size.      PERITONEUM/RETROPERITONEUM/LYMPH NODES:  There is no free intraperitoneal air. There is no fluid around the  peritoneal shunt tubing. There is no pneumoperitoneum. No mesenteric  inflammation is identified. There are no pathologically enlarged  retroperitoneal lymph nodes.      ABDOMINAL WALL:  Fat containing umbilical hernia has hernia sac diameter 4.7 cm.      BONES:  Diffuse degenerative changes are present through the spine. No acute  or suspicious abnormality is noted.      Impression: Chest  1.  No pneumonia is identified.      Abdomen-Pelvis  1.  Ventriculoperitoneal shunt tubing terminates in the left lower  quadrant of the abdomen with no surrounding fluid collection noted.  2. Fat containing umbilical hernia is noted  3. Bilateral adrenal adenomas  4. No acute inflammatory abnormality          MACRO:  None      Signed by: Phyllis Chatman 2/10/2025 4:24 PM  Dictation workstation:   [id number]  XR chest 2 views  Narrative: Interpreted By:  Phyllis Chatman,   STUDY:  XR CHEST 2 VIEWS;  2/10/2025 3:52 pm      INDICATION:  Signs/Symptoms:Productive cough and weakness.          COMPARISON:  02/21/2024      ACCESSION NUMBER(S):  [id number]      ORDERING CLINICIAN:  EULA LOUISE      FINDINGS:  AP supine and sitting lateral views of the chest were obtained.              CARDIOMEDIASTINAL SILHOUETTE:  Cardiomediastinal silhouette is normal in size and configuration.  Aorta is atherosclerotic.      LUNGS:  Lungs are clear.      ABDOMEN:  No remarkable upper abdominal findings.      BONES:  No acute osseous changes.      Shunt tubing coursing right side of neck and midline anteriorly in  the chest wall is new compared to the prior  exam.      Impression: 1.  No evidence of acute cardiopulmonary process.  2. Shunt tubing in the anterior chest is new compared to the prior  chest x-ray.              MACRO:  None      Signed by: Phyllis Chatman 2/10/2025 4:06 PM  Dictation workstation:   [id number]  XR shunt series  Narrative: Interpreted By:  Phyllis Chatman,   STUDY:  XR SHUNT SERIES; ;  2/10/2025 3:52 pm      INDICATION:  Signs/Symptoms:Headache with history of shunt placed last July 29.          COMPARISON:  CT head performed same day      ACCESSION NUMBER(S):  [id number]      ORDERING CLINICIAN:  EULA LOUISE      FINDINGS:  AP and lateral views of the skull and AP views of the neck, chest and  abdomen were obtained. Intraventricular shunt enters through the  right frontal bone and the tip projects to the left of midline  superior to the level of the sella turcica. CT head documents the tip  position near the level of the left foramen of Monro.      Extracranial portion of the shunt passes posterior to the right ear,  along the right side of the neck and midline along the chest. In the  abdomen the tubing is curved in the left side of the abdomen with the  tip in the left lower quadrant. There are no kinks or discontinuities  noted in the tubing. Within the abdomen there is no displaced bowel  in the region of the tip of the tubing to suggest a localized fluid  collection.      Lungs are grossly clear. Heart appears within normal range for the  projection. Bowel gas pattern is nonobstructive with patchy small  bowel and colonic air. Degenerative changes are noted throughout the  spine.      Impression: No discontinuity or kinking is noted in the ventricular shunt tubing          MACRO:  None      Signed by: Phyllis Chatman 2/10/2025 4:03 PM  Dictation workstation:   [id number]  CT head wo IV contrast  Narrative: Interpreted By:  Phyllis Chatman,   STUDY:  CT HEAD WO IV CONTRAST;  2/10/2025 3:30 pm       INDICATION:  Signs/Symptoms:Severe headache with history of shunt.          COMPARISON:  07/30/2024      ACCESSION NUMBER(S):  [id number]      ORDERING CLINICIAN:  EULA LOUISE      TECHNIQUE:  Noncontrast axial CT scan of head was performed. Angled reformats in  brain and bone windows and coronal and sagittal reformats in brain  window were generated.      FINDINGS:  CSF Spaces: Ventricular shunt enters through the right frontal bone  in the tip of the shunt extends through the frontal horn of the right  lateral ventricle with the tip projecting near the left foramen of  Monro, appearing to cross the lower portion of the septum pellucidum.  Position is unchanged. Ventricles are stable in size. There is no  extraaxial fluid collection.      Parenchyma:  The grey-white differentiation is intact. There is no  mass effect or midline shift.  There is no intracranial hemorrhage.      Calvarium: Hyperostosis frontalis interna is unchanged.      Paranasal sinuses and mastoids: Visualized paranasal sinuses and  mastoids are clear.      Impression: No evidence of acute cortical infarct or intracranial hemorrhage.      Intraventricular shunt is unchanged in position and ventricular sizes  are stable.      MACRO:  None.      Signed by: Phyllis Chatman 2/10/2025 4:00 PM  Dictation workstation:   [id number]       Medications:    Current Facility-Administered Medications:     acetaminophen (Tylenol) tablet 650 mg, 650 mg, oral, q4h PRN **OR** acetaminophen (Tylenol) oral liquid 650 mg, 650 mg, nasogastric tube, q4h PRN **OR** acetaminophen (Tylenol) suppository 650 mg, 650 mg, rectal, q4h PRN, Jo-Ann Marie MD    guaiFENesin (Mucinex) 12 hr tablet 600 mg, 600 mg, oral, BID, Jo-Ann Marie MD    heparin (porcine) injection 5,000 Units, 5,000 Units, subcutaneous, q8h, Jo-Ann Marie MD    [START ON 2/11/2025] insulin lispro injection 0-10 Units, 0-10 Units, subcutaneous, TID AC, Jo-Ann Marie MD    ondansetron (Zofran)  injection 4 mg, 4 mg, intravenous, Once, Jerry Ledezma, APRN-CNP    ondansetron (Zofran) tablet 4 mg, 4 mg, oral, q8h PRN **OR** ondansetron (Zofran) injection 4 mg, 4 mg, intravenous, q8h PRN, Jo-Ann Marie MD    [START ON 2/11/2025] oseltamivir (Tamiflu) capsule 30 mg, 30 mg, oral, BID, Jo-Ann Marie MD    oseltamivir (Tamiflu) capsule 75 mg, 75 mg, oral, Once, Jo-Ann Marie MD    Current Outpatient Medications:     amLODIPine (Norvasc) 5 mg tablet, Take 1 tablet (5 mg) by mouth once daily., Disp: 90 tablet, Rfl: 0    blood sugar diagnostic (Blood Glucose Test) strip, 1 strip 2 times a day., Disp: 100 strip, Rfl: 1    empagliflozin (Jardiance) 10 mg, Take 1 tablet (10 mg) by mouth once daily., Disp: 90 tablet, Rfl: 0    glimepiride (Amaryl) 2 mg tablet, Take 1 tablet (2 mg) by mouth once daily in the morning. Take before meals., Disp: 90 tablet, Rfl: 0    losartan (Cozaar) 100 mg tablet, Take 1 tablet (100 mg) by mouth once daily., Disp: 90 tablet, Rfl: 0    rosuvastatin (Crestor) 20 mg tablet, Take 1 tablet (20 mg) by mouth once daily., Disp: 90 tablet, Rfl: 0    Objective Data  Physical Exam  Constitutional:       Appearance: She is ill-appearing.   HENT:      Head: Normocephalic and atraumatic.   Eyes:      Pupils: Pupils are equal, round, and reactive to light.   Cardiovascular:      Rate and Rhythm: Normal rate and regular rhythm.      Heart sounds: No murmur heard.     No friction rub.      Comments: Distant heart sounds  Pulmonary:      Effort: Pulmonary effort is normal.      Breath sounds: No stridor. Wheezing and rales present.   Abdominal:      General: There is distension.      Palpations: There is no mass.      Tenderness: There is no abdominal tenderness. There is no guarding or rebound.      Hernia: A hernia is present.   Musculoskeletal:         General: Swelling present.      Right lower leg: Edema present.      Left lower leg: Edema present.      Comments: Bilateral lower extremity edema, R>L    Skin:     General: Skin is dry.      Capillary Refill: Capillary refill takes less than 2 seconds.   Neurological:      General: No focal deficit present.      Mental Status: She is alert and oriented to person, place, and time.          Assessment and Plan   Vee Pimentel is a 74 y.o. female with a PMH of Vee Pimentel is a 74 y.o. female with a PMH of NPH (s/p  shunt placement), HLD, HTN, vertigo, T2D, chronic sinusitis, HFpEF, MARISELA, glaucoma admitted for LESLEE in setting of influenza A infection.    Acute Medical Issues:  #Acute Kidney Injury  - In the setting of influenza A infection, diarrhea, and poor PO intake  - Baseline Cr. 0.6, 1.22 on admission  - S/P 1L fluid bolus in ED  Plan:  - continue IV fluids at 100cc/hr for 10 hrs  - avoid nephrotoxic medications  - strict I/Os    #Influenza A infection  - No imaging evidence of consolidative pneumonia  Plan:  - Tamiflu w/ Renal dosing  - guaifenesin    #Chronic diarrhea  #Dehydration  - hx of prior antibiotic usage several months ago around the time of onset  - hx of frequent supplement use  Plan:  - stool pathogen panel  - fluid management per above  - consult to wound care, appreciate recs  - urinalysis    #Bilateral LE edema, R>L  Plan:  - DVT ultrasound    Chronic Medical Issues:   #Type 2 diabetes- sliding scale insulin  #HTN- hold home medications as pt reports not taking  #HLD- continue crestor    Fluids: LR 1L over 10 hrs  Electrolytes: replete as needed  Nutrition: Adult CC  GI Prophylaxis: none  DVT Prophylaxis: heparin    Access: pIV  Antibiotics: none  Oxygenation: RA    Dispo: To be admitted for fluid repletion and pending results of above studies. Estimated length of stay <48 hours.    Alf Bennett MD PGY-1

## 2025-02-10 NOTE — ED PROVIDER NOTES
HPI   Chief Complaint   Patient presents with    Flu Symptoms       74-year-old female is brought in with abdominal pain, nausea, and vomiting.  She has had a cough for several days.  Her pulse ox was 88% on room air.  She is endorsing diarrhea and incontinence of stool.  She had a shunt placed July 29 of last year.  She is endorsing severe headache.  She denies any neurologic deficit.  She denies any recent trauma or fall.  She has no other cause for concern or complaint.  Abdominal pain is rated 8 out of 10.  Her blood glucose on the squad was 295 and her EKG in the squad was normal sinus rhythm.      History provided by:  Patient and EMS personnel   used: No            Patient History   Past Medical History:   Diagnosis Date    Anxiety     Arthritis     Basal cell carcinoma of leg, left     Cataract     Depression     Glaucoma     Hyperlipidemia     Hypertension     NPH (normal pressure hydrocephalus) (Multi)     Obesity     Sleep apnea     Type 2 diabetes mellitus     Urinary incontinence     Urinary retention     Vision loss     left eye fibrosis     Past Surgical History:   Procedure Laterality Date    APPENDECTOMY      HERNIA REPAIR      LUMBAR PUNCTURE      TONSILLECTOMY       Family History   Problem Relation Name Age of Onset    Dementia Mother      Heart failure Mother      Fibromyalgia Mother      Other (ABDOMINAL ANER) Father       Social History     Tobacco Use    Smoking status: Never    Smokeless tobacco: Never   Vaping Use    Vaping status: Never Used   Substance Use Topics    Alcohol use: Not Currently     Comment: occassional    Drug use: Never       Physical Exam   ED Triage Vitals   Temp Pulse Resp BP   -- -- -- --      SpO2 Temp src Heart Rate Source Patient Position   -- -- -- --      BP Location FiO2 (%)     -- --       Physical Exam  Constitutional:       Appearance: Normal appearance. She is obese.   HENT:      Head: Normocephalic.      Right Ear: Tympanic membrane  normal.      Left Ear: Tympanic membrane normal.      Nose: Nose normal.      Mouth/Throat:      Mouth: Mucous membranes are dry.   Cardiovascular:      Rate and Rhythm: Normal rate and regular rhythm.      Pulses: Normal pulses.      Heart sounds: Normal heart sounds.   Pulmonary:      Effort: Pulmonary effort is normal.      Breath sounds: Normal breath sounds.   Abdominal:      General: Bowel sounds are normal.      Palpations: Abdomen is soft.      Tenderness: There is abdominal tenderness.   Musculoskeletal:         General: Normal range of motion.      Cervical back: Normal range of motion and neck supple.   Skin:     General: Skin is warm.      Capillary Refill: Capillary refill takes less than 2 seconds.   Neurological:      General: No focal deficit present.      Mental Status: She is alert and oriented to person, place, and time.      Cranial Nerves: No cranial nerve deficit.      Sensory: No sensory deficit.      Motor: No weakness.      Coordination: Coordination normal.      Gait: Gait normal.      Deep Tendon Reflexes: Reflexes normal.   Psychiatric:         Mood and Affect: Mood normal.         Behavior: Behavior normal.           ED Course & MDM   Diagnoses as of 02/10/25 1729   Influenza A   Lactate blood increase   Acute kidney injury (CMS-HCC)                 No data recorded     Adelina Coma Scale Score: 15 (02/10/25 1500 : Maryana Bahena RN)       NIH Stroke Scale: 0 (02/10/25 1454 : Jerry Ledezma, APRN-CNP)                   Medical Decision Making  Shunt series was ordered.  CT head ordered for acute and severe headache.  CT chest abdomen pelvis ordered noncontrast with concern for acute abdominal pain and acute hypoxia.  Basic labs were ordered along with EKG.  NIH was 0 and stroke van was negative.  She received 975 acetaminophen and 4 mg Zofran and 1/2 L normal saline.  Patient's vital signs were checked 3 times and she was now 96% on room air at 5:27 PM.  She responded well to meclizine.   She responded well to the second liter normal saline.  The redraw of lactate was now normal at 1.3.  Her troponin was normal at 11.  She was positive for influenza A but negative for influenza B.  Her magnesium was normal.  Her metabolic panel blood glucose of 225 which is baseline for patient.  We were concerned about her acute kidney injury as her GFR is normally over 90 and today it was 47.  Her creatinine is normal on previous labs but today is 1.22.  Her BUN was 29.  AST and ALT were normal.  The INR was normal CBC showed no leukocytosis or left shift.  Chest x-ray showed no evidence of an acute cardiopulmonary process.  Shunt tubing in the anterior chest is new compared to the prior.  The x-ray of the shunt series showed no discontinuity or kinking.  The CT chest abdomen pelvis showed no pneumonia identified.  Shunt tubing terminates in the left lower quadrant of the abdomen with no surrounding fluid collection.  There was fat-containing umbilical hernia.  There was bilateral adrenal adenomas.  There was no acute inflammatory changes.  Seen and staffed with attending and safely admitted to observation.    Amount and/or Complexity of Data Reviewed  Labs: ordered.  Radiology: ordered and independent interpretation performed.  ECG/medicine tests: ordered and independent interpretation performed.     Details: 70 bpm normal sinus rhythm.  Right bundle branch block.  Unchanged from the EKG dated July 2024.  ND interval is normal.  QT corrected is prolonged at 518 also unchanged from July 2024.  Interpreted both by attending and myself.        Procedure  Procedures     Jerry Ledezma, BIBI-CNP  02/10/25 8785

## 2025-02-10 NOTE — ED NOTES
Pharmacy Medication History Review    Vee Pimentel is a 74 y.o. female admitted for Influenza A. Pharmacy reviewed the patient's [id number] medications and allergies for accuracy.    The list below reflectives the updated PTA list. Please review each medication in order reconciliation for additional clarification and justification.  Prior to Admission Medications   Prescriptions Last Dose Informant Patient Reported? Taking?   amLODIPine (Norvasc) 5 mg tablet 2025 Self Yes Yes   Sig: Take 1 tablet (5 mg) by mouth once daily.   blood sugar diagnostic (Blood Glucose Test) strip  Self Yes Yes   Si strip 2 times a day.   empagliflozin (Jardiance) 10 mg Not Taking Self Yes Not started due to cost   Sig: Take 1 tablet (10 mg) by mouth once daily.   Patient not taking: Reported on 2/10/2025   glimepiride (Amaryl) 2 mg tablet 2025 Self Yes Yes   Sig: Take 1 tablet (2 mg) by mouth once daily in the morning. Take before meals.   losartan (Cozaar) 100 mg tablet 2025 Self Yes Yes   Sig: Take 1 tablet (100 mg) by mouth once daily.   rosuvastatin (Crestor) 20 mg tablet 2025 Self Yes Yes   Sig: Take 1 tablet (20 mg) by mouth once daily.      Facility-Administered Medications: None           The list below reflectives the updated allergy list. Please review each documented allergy for additional clarification and justification.  Allergies  Reviewed by Jerry Ledezma, APRN-ENIO on 2/10/2025        Severity Reactions Comments    Azithromycin Not Specified Angioedema     Glimepiride Not Specified GI bleeding     Glycerin Not Specified Unknown     Metformin Not Specified GI Upset     Penicillins Not Specified Other     Amoxicillin Low Rash             Below are additional concerns with the patient's PTA list.    Yamel Morales

## 2025-02-11 ENCOUNTER — HOME HEALTH ADMISSION (OUTPATIENT)
Dept: HOME HEALTH SERVICES | Facility: HOME HEALTH | Age: 75
End: 2025-02-11
Payer: MEDICARE

## 2025-02-11 ENCOUNTER — APPOINTMENT (OUTPATIENT)
Dept: VASCULAR MEDICINE | Facility: HOSPITAL | Age: 75
End: 2025-02-11
Payer: MEDICARE

## 2025-02-11 VITALS
SYSTOLIC BLOOD PRESSURE: 181 MMHG | HEIGHT: 57 IN | TEMPERATURE: 98.6 F | BODY MASS INDEX: 38.36 KG/M2 | DIASTOLIC BLOOD PRESSURE: 80 MMHG | HEART RATE: 89 BPM | RESPIRATION RATE: 16 BRPM | OXYGEN SATURATION: 92 % | WEIGHT: 177.8 LBS

## 2025-02-11 LAB
ALBUMIN SERPL BCP-MCNC: 3.2 G/DL (ref 3.4–5)
ANION GAP SERPL CALC-SCNC: 13 MMOL/L (ref 10–20)
BACTERIA UR CULT: NORMAL
BUN SERPL-MCNC: 14 MG/DL (ref 6–23)
CALCIUM SERPL-MCNC: 8.3 MG/DL (ref 8.6–10.3)
CHLORIDE SERPL-SCNC: 107 MMOL/L (ref 98–107)
CO2 SERPL-SCNC: 24 MMOL/L (ref 21–32)
CREAT SERPL-MCNC: 0.49 MG/DL (ref 0.5–1.05)
EGFRCR SERPLBLD CKD-EPI 2021: >90 ML/MIN/1.73M*2
ERYTHROCYTE [DISTWIDTH] IN BLOOD BY AUTOMATED COUNT: 12.5 % (ref 11.5–14.5)
GLUCOSE BLD MANUAL STRIP-MCNC: 104 MG/DL (ref 74–99)
GLUCOSE BLD MANUAL STRIP-MCNC: 142 MG/DL (ref 74–99)
GLUCOSE BLD MANUAL STRIP-MCNC: 156 MG/DL (ref 74–99)
GLUCOSE SERPL-MCNC: 95 MG/DL (ref 74–99)
HCT VFR BLD AUTO: 36.6 % (ref 36–46)
HGB BLD-MCNC: 12.2 G/DL (ref 12–16)
HOLD SPECIMEN: NORMAL
MAGNESIUM SERPL-MCNC: 1.74 MG/DL (ref 1.6–2.4)
MCH RBC QN AUTO: 30.4 PG (ref 26–34)
MCHC RBC AUTO-ENTMCNC: 33.3 G/DL (ref 32–36)
MCV RBC AUTO: 91 FL (ref 80–100)
NRBC BLD-RTO: 0 /100 WBCS (ref 0–0)
PHOSPHATE SERPL-MCNC: 3.4 MG/DL (ref 2.5–4.9)
PLATELET # BLD AUTO: 139 X10*3/UL (ref 150–450)
POTASSIUM SERPL-SCNC: 3.5 MMOL/L (ref 3.5–5.3)
RBC # BLD AUTO: 4.01 X10*6/UL (ref 4–5.2)
SODIUM SERPL-SCNC: 140 MMOL/L (ref 136–145)
WBC # BLD AUTO: 3.7 X10*3/UL (ref 4.4–11.3)

## 2025-02-11 PROCEDURE — 85027 COMPLETE CBC AUTOMATED: CPT

## 2025-02-11 PROCEDURE — 2500000002 HC RX 250 W HCPCS SELF ADMINISTERED DRUGS (ALT 637 FOR MEDICARE OP, ALT 636 FOR OP/ED)

## 2025-02-11 PROCEDURE — 82947 ASSAY GLUCOSE BLOOD QUANT: CPT

## 2025-02-11 PROCEDURE — 97530 THERAPEUTIC ACTIVITIES: CPT | Mod: GO

## 2025-02-11 PROCEDURE — 99238 HOSP IP/OBS DSCHRG MGMT 30/<: CPT

## 2025-02-11 PROCEDURE — 36415 COLL VENOUS BLD VENIPUNCTURE: CPT

## 2025-02-11 PROCEDURE — 93970 EXTREMITY STUDY: CPT | Performed by: INTERNAL MEDICINE

## 2025-02-11 PROCEDURE — 93970 EXTREMITY STUDY: CPT

## 2025-02-11 PROCEDURE — 97161 PT EVAL LOW COMPLEX 20 MIN: CPT | Mod: GP

## 2025-02-11 PROCEDURE — 97165 OT EVAL LOW COMPLEX 30 MIN: CPT | Mod: GO

## 2025-02-11 PROCEDURE — 96372 THER/PROPH/DIAG INJ SC/IM: CPT | Mod: 59

## 2025-02-11 PROCEDURE — G0378 HOSPITAL OBSERVATION PER HR: HCPCS

## 2025-02-11 PROCEDURE — 80069 RENAL FUNCTION PANEL: CPT

## 2025-02-11 PROCEDURE — 2500000001 HC RX 250 WO HCPCS SELF ADMINISTERED DRUGS (ALT 637 FOR MEDICARE OP)

## 2025-02-11 PROCEDURE — 2500000004 HC RX 250 GENERAL PHARMACY W/ HCPCS (ALT 636 FOR OP/ED)

## 2025-02-11 PROCEDURE — 83735 ASSAY OF MAGNESIUM: CPT

## 2025-02-11 PROCEDURE — 97530 THERAPEUTIC ACTIVITIES: CPT | Mod: GP

## 2025-02-11 RX ORDER — OSELTAMIVIR PHOSPHATE 30 MG/1
30 CAPSULE ORAL 2 TIMES DAILY
Qty: 9 CAPSULE | Refills: 0 | Status: SHIPPED | OUTPATIENT
Start: 2025-02-12 | End: 2025-02-17

## 2025-02-11 RX ORDER — OSELTAMIVIR PHOSPHATE 30 MG/1
30 CAPSULE ORAL 2 TIMES DAILY
Qty: 6 CAPSULE | Refills: 0 | Status: SHIPPED | OUTPATIENT
Start: 2025-02-12 | End: 2025-02-11

## 2025-02-11 RX ADMIN — HEPARIN SODIUM 5000 UNITS: 5000 INJECTION, SOLUTION INTRAVENOUS; SUBCUTANEOUS at 04:28

## 2025-02-11 RX ADMIN — GUAIFENESIN 600 MG: 600 TABLET ORAL at 09:03

## 2025-02-11 RX ADMIN — Medication 1 APPLICATION: at 09:03

## 2025-02-11 RX ADMIN — INSULIN LISPRO 2 UNITS: 100 INJECTION, SOLUTION INTRAVENOUS; SUBCUTANEOUS at 13:17

## 2025-02-11 RX ADMIN — HEPARIN SODIUM 5000 UNITS: 5000 INJECTION, SOLUTION INTRAVENOUS; SUBCUTANEOUS at 11:44

## 2025-02-11 RX ADMIN — OSELTAMAVIR PHOSPHATE 30 MG: 30 CAPSULE ORAL at 09:03

## 2025-02-11 SDOH — SOCIAL STABILITY: SOCIAL INSECURITY
WITHIN THE LAST YEAR, HAVE YOU BEEN RAPED OR FORCED TO HAVE ANY KIND OF SEXUAL ACTIVITY BY YOUR PARTNER OR EX-PARTNER?: NO

## 2025-02-11 SDOH — SOCIAL STABILITY: SOCIAL INSECURITY: WITHIN THE LAST YEAR, HAVE YOU BEEN AFRAID OF YOUR PARTNER OR EX-PARTNER?: NO

## 2025-02-11 SDOH — SOCIAL STABILITY: SOCIAL INSECURITY: WITHIN THE LAST YEAR, HAVE YOU BEEN HUMILIATED OR EMOTIONALLY ABUSED IN OTHER WAYS BY YOUR PARTNER OR EX-PARTNER?: NO

## 2025-02-11 SDOH — SOCIAL STABILITY: SOCIAL INSECURITY
WITHIN THE LAST YEAR, HAVE YOU BEEN KICKED, HIT, SLAPPED, OR OTHERWISE PHYSICALLY HURT BY YOUR PARTNER OR EX-PARTNER?: NO

## 2025-02-11 SDOH — ECONOMIC STABILITY: INCOME INSECURITY: IN THE PAST 12 MONTHS HAS THE ELECTRIC, GAS, OIL, OR WATER COMPANY THREATENED TO SHUT OFF SERVICES IN YOUR HOME?: NO

## 2025-02-11 SDOH — SOCIAL STABILITY: SOCIAL INSECURITY: ARE YOU OR HAVE YOU BEEN THREATENED OR ABUSED PHYSICALLY, EMOTIONALLY, OR SEXUALLY BY ANYONE?: NO

## 2025-02-11 SDOH — SOCIAL STABILITY: SOCIAL INSECURITY: WERE YOU ABLE TO COMPLETE ALL THE BEHAVIORAL HEALTH SCREENINGS?: YES

## 2025-02-11 SDOH — SOCIAL STABILITY: SOCIAL INSECURITY: ABUSE: ADULT

## 2025-02-11 SDOH — ECONOMIC STABILITY: FOOD INSECURITY: WITHIN THE PAST 12 MONTHS, THE FOOD YOU BOUGHT JUST DIDN'T LAST AND YOU DIDN'T HAVE MONEY TO GET MORE.: NEVER TRUE

## 2025-02-11 SDOH — SOCIAL STABILITY: SOCIAL INSECURITY: HAS ANYONE EVER THREATENED TO HURT YOUR FAMILY OR YOUR PETS?: NO

## 2025-02-11 SDOH — SOCIAL STABILITY: SOCIAL INSECURITY: DOES ANYONE TRY TO KEEP YOU FROM HAVING/CONTACTING OTHER FRIENDS OR DOING THINGS OUTSIDE YOUR HOME?: NO

## 2025-02-11 SDOH — SOCIAL STABILITY: SOCIAL INSECURITY: ARE THERE ANY APPARENT SIGNS OF INJURIES/BEHAVIORS THAT COULD BE RELATED TO ABUSE/NEGLECT?: NO

## 2025-02-11 SDOH — ECONOMIC STABILITY: FOOD INSECURITY: WITHIN THE PAST 12 MONTHS, YOU WORRIED THAT YOUR FOOD WOULD RUN OUT BEFORE YOU GOT THE MONEY TO BUY MORE.: NEVER TRUE

## 2025-02-11 SDOH — SOCIAL STABILITY: SOCIAL INSECURITY: HAVE YOU HAD THOUGHTS OF HARMING ANYONE ELSE?: NO

## 2025-02-11 SDOH — SOCIAL STABILITY: SOCIAL INSECURITY: DO YOU FEEL ANYONE HAS EXPLOITED OR TAKEN ADVANTAGE OF YOU FINANCIALLY OR OF YOUR PERSONAL PROPERTY?: NO

## 2025-02-11 SDOH — SOCIAL STABILITY: SOCIAL INSECURITY: DO YOU FEEL UNSAFE GOING BACK TO THE PLACE WHERE YOU ARE LIVING?: NO

## 2025-02-11 ASSESSMENT — ACTIVITIES OF DAILY LIVING (ADL)
ADEQUATE_TO_COMPLETE_ADL: YES
ADLS_ADDRESSED: YES
WALKS IN HOME: NEEDS ASSISTANCE
ADL_ASSISTANCE: NEEDS ASSISTANCE
DRESSING YOURSELF: INDEPENDENT
GROOMING: INDEPENDENT
TOILETING: INDEPENDENT
LACK_OF_TRANSPORTATION: NO
HEARING - RIGHT EAR: FUNCTIONAL
JUDGMENT_ADEQUATE_SAFELY_COMPLETE_DAILY_ACTIVITIES: YES
ADL_ASSISTANCE: NEEDS ASSISTANCE
PATIENT'S MEMORY ADEQUATE TO SAFELY COMPLETE DAILY ACTIVITIES?: YES
HEARING - LEFT EAR: FUNCTIONAL
FEEDING YOURSELF: INDEPENDENT
BATHING: NEEDS ASSISTANCE
BATHING_ASSISTANCE: MAXIMAL
LACK_OF_TRANSPORTATION: NO

## 2025-02-11 ASSESSMENT — COGNITIVE AND FUNCTIONAL STATUS - GENERAL
DAILY ACTIVITIY SCORE: 15
PERSONAL GROOMING: A LITTLE
STANDING UP FROM CHAIR USING ARMS: A LITTLE
DRESSING REGULAR LOWER BODY CLOTHING: A LOT
CLIMB 3 TO 5 STEPS WITH RAILING: A LOT
MOVING TO AND FROM BED TO CHAIR: A LITTLE
CLIMB 3 TO 5 STEPS WITH RAILING: A LOT
WALKING IN HOSPITAL ROOM: A LITTLE
TOILETING: TOTAL
CLIMB 3 TO 5 STEPS WITH RAILING: A LOT
MOBILITY SCORE: 17
DAILY ACTIVITIY SCORE: 20
MOVING FROM LYING ON BACK TO SITTING ON SIDE OF FLAT BED WITH BEDRAILS: A LITTLE
TOILETING: A LITTLE
DAILY ACTIVITIY SCORE: 20
MOVING FROM LYING ON BACK TO SITTING ON SIDE OF FLAT BED WITH BEDRAILS: A LITTLE
DRESSING REGULAR LOWER BODY CLOTHING: A LITTLE
DRESSING REGULAR LOWER BODY CLOTHING: A LITTLE
MOBILITY SCORE: 18
PERSONAL GROOMING: A LITTLE
TURNING FROM BACK TO SIDE WHILE IN FLAT BAD: A LITTLE
PATIENT BASELINE BEDBOUND: NO
PERSONAL GROOMING: A LITTLE
STANDING UP FROM CHAIR USING ARMS: A LITTLE
MOVING TO AND FROM BED TO CHAIR: A LITTLE
TURNING FROM BACK TO SIDE WHILE IN FLAT BAD: A LITTLE
TOILETING: A LITTLE
TURNING FROM BACK TO SIDE WHILE IN FLAT BAD: A LITTLE
WALKING IN HOSPITAL ROOM: A LITTLE
HELP NEEDED FOR BATHING: A LITTLE
DRESSING REGULAR UPPER BODY CLOTHING: A LITTLE
HELP NEEDED FOR BATHING: A LITTLE
MOVING TO AND FROM BED TO CHAIR: A LITTLE
MOBILITY SCORE: 17
HELP NEEDED FOR BATHING: A LOT
WALKING IN HOSPITAL ROOM: A LITTLE
STANDING UP FROM CHAIR USING ARMS: A LITTLE

## 2025-02-11 ASSESSMENT — PAIN - FUNCTIONAL ASSESSMENT
PAIN_FUNCTIONAL_ASSESSMENT: 0-10

## 2025-02-11 ASSESSMENT — PATIENT HEALTH QUESTIONNAIRE - PHQ9
SUM OF ALL RESPONSES TO PHQ9 QUESTIONS 1 & 2: 2
1. LITTLE INTEREST OR PLEASURE IN DOING THINGS: SEVERAL DAYS
2. FEELING DOWN, DEPRESSED OR HOPELESS: SEVERAL DAYS

## 2025-02-11 ASSESSMENT — LIFESTYLE VARIABLES
HOW OFTEN DO YOU HAVE A DRINK CONTAINING ALCOHOL: NEVER
AUDIT-C TOTAL SCORE: 0
AUDIT-C TOTAL SCORE: 0
HOW MANY STANDARD DRINKS CONTAINING ALCOHOL DO YOU HAVE ON A TYPICAL DAY: PATIENT DOES NOT DRINK
HOW OFTEN DO YOU HAVE 6 OR MORE DRINKS ON ONE OCCASION: NEVER
SKIP TO QUESTIONS 9-10: 1

## 2025-02-11 ASSESSMENT — PAIN SCALES - GENERAL
PAINLEVEL_OUTOF10: 0 - NO PAIN

## 2025-02-11 NOTE — CARE PLAN
The patient's goals for the shift include  Problem: Pain - Adult  Goal: Verbalizes/displays adequate comfort level or baseline comfort level  Outcome: Progressing     Problem: Safety - Adult  Goal: Free from fall injury  Outcome: Progressing     Problem: Discharge Planning  Goal: Discharge to home or other facility with appropriate resources  Outcome: Progressing     Problem: Chronic Conditions and Co-morbidities  Goal: Patient's chronic conditions and co-morbidity symptoms are monitored and maintained or improved  Outcome: Progressing     Problem: Nutrition  Goal: Nutrient intake appropriate for maintaining nutritional needs  Outcome: Progressing

## 2025-02-11 NOTE — PROGRESS NOTES
02/11/25 8517   Discharge Planning   Living Arrangements Alone   Support Systems Family members  (Patient's niece Tamera helps to care for the patient.)   Assistance Needed Alert and oriented x 3, Independent with some ADL's, Needs assistance with bathing(niece assists), Doesn't do much cooking, eats fast food at times,Cane, Walker, Shower chair, No grab bars, Drives short distances   Type of Residence Private residence  (Lives in an apartment)   Number of Stairs to Enter Residence 0   Number of Stairs Within Residence 0   Do you have animals or pets at home? No   Who is requesting discharge planning? Provider   Home or Post Acute Services Other (Comment)  (TBD, OT recommending moderate intensity rehab, PT evaluation recommnedations are pending at this time.)   Expected Discharge Disposition SNF  (TBD, OT recommending moderate intensity rehab, PT evaluation recommendations are pending at this time)   Does the patient need discharge transport arranged? Yes   RoundTrip coordination needed? Yes   Has discharge transport been arranged? No   Financial Resource Strain   How hard is it for you to pay for the very basics like food, housing, medical care, and heating? Pt Declined   Housing Stability   In the last 12 months, was there a time when you were not able to pay the mortgage or rent on time? Pt Declined   In the past 12 months, how many times have you moved where you were living? 0   At any time in the past 12 months, were you homeless or living in a shelter (including now)? Pt Declined   Transportation Needs   In the past 12 months, has lack of transportation kept you from medical appointments or from getting medications? no   In the past 12 months, has lack of transportation kept you from meetings, work, or from getting things needed for daily living? No   Patient Choice   Provider Choice list and CMS website (https://medicare.gov/care-compare#search) for post-acute Quality and Resource Measure Data were  provided and reviewed with: Patient;Family   Patient / Family choosing to utilize agency / facility established prior to hospitalization No   Stroke Family Assessment   Stroke Family Assessment Needed No   Intensity of Service   Intensity of Service 0-30 min

## 2025-02-11 NOTE — HOSPITAL COURSE
Vee Pimentel is a 74 y.o. female with a PMH of NPH (s/p  shunt placement), HLD, HTN, vertigo, T2D, chronic sinusitis, HFpEF, MARISELA, glaucoma who presented for abdominal pain, diarrhea, cough, and headache.  She has a several month history of profuse diarrhea and stool incontinence.    She has a 2 to 3-day history of worsening upper respiratory symptoms and productive cough. She has had no fevers but is experiencing chills. She has had poor oral food and water intake. In the ED she is found to be positive for influenza A.  She was also found to have LESLEE with creatinine around 1.2 and mildly elevated lactate to 2.3.  Both lactate and creatinine improved following fluid repletion in the ED and on the floor. She was noted to have mild leg edema, worse on her Right side, but had a negative DVT ultrasound.    She has no oxygen requirement no leukocytosis to suggest significant infection.  She was started on Tamiflu in the hospital. Patient remained hemodynamically stable and will be discharged on 2/11/25 with following recommendations:  -  Home care set up for PT/OT  - Complete Tamiflu for 3 more days  - Please STOP taking excessive vitamins.   - Please follow up with PCP as outpatient

## 2025-02-11 NOTE — PROGRESS NOTES
Patient: Vee Pimentel Age: 74 y.o.   Gender: female Room/bed: 226/226-A     Attending: Jake Lugo MD  Code Status:  Full Code    Overnight Events  - NAEON  -Patient is feeling better today, still has productive cough.  1 documented fever this morning to 100.4 °F.  She states that diarrhea is improved.  Appetite is also improved and she was able to order breakfast.    Objective:  Physical Exam  Constitutional:       Appearance: She is obese.   HENT:      Mouth/Throat:      Mouth: Mucous membranes are moist.   Eyes:      Pupils: Pupils are equal, round, and reactive to light.   Cardiovascular:      Rate and Rhythm: Normal rate and regular rhythm.      Pulses: Normal pulses.      Comments: Distant heart sounds  Pulmonary:      Effort: Pulmonary effort is normal.      Comments: Dry crackles in the upper lobes bilaterally  Abdominal:      General: There is distension.      Tenderness: There is no abdominal tenderness.      Hernia: A hernia is present.      Comments: Small umbilical hernia   Musculoskeletal:      Right lower leg: Edema present.      Left lower leg: Edema present.   Skin:     Capillary Refill: Capillary refill takes less than 2 seconds.   Neurological:      General: No focal deficit present.      Mental Status: She is alert and oriented to person, place, and time.          Temp:  [36.5 °C (97.7 °F)-38 °C (100.4 °F)] 38 °C (100.4 °F)  Heart Rate:  [64-86] 78  Resp:  [16-19] 16  BP: (105-161)/(58-73) 154/72    Vitals:    02/11/25 0005   Weight: 80.6 kg (177 lb 12.8 oz)             I/Os    Intake/Output Summary (Last 24 hours) at 2/11/2025 1128  Last data filed at 2/11/2025 0618  Gross per 24 hour   Intake 1000 ml   Output --   Net 1000 ml       Labs:   CBC:  Recent Labs     02/11/25  0743 02/10/25  1507 07/30/24  1617   WBC 3.7* 5.0 11.0   HGB 12.2 14.9 12.4   HCT 36.6 44.9 38.2   * 196 230   MCV 91 91 93     CMP:  Recent Labs     02/11/25  0743 02/10/25  1507 07/30/24  1617    136 140   K 3.5  "3.5 3.8    98 104   CO2 24 23 26   ANIONGAP 13 19 14   BUN 14 29* 13   CREATININE 0.49* 1.22* 0.57   EGFR >90 47* >90   GLUCOSE 95 225* 144*     Recent Labs     02/11/25  0743 02/10/25  1507 07/30/24  1617 02/21/24  1548   ALBUMIN 3.2* 4.3 4.0 3.6   ALKPHOS  --  53  --  76   ALT  --  22  --  13   AST  --  33  --  13   BILITOT  --  0.3  --  0.4     Calcium/Phos:   Lab Results   Component Value Date    CALCIUM 8.3 (L) 02/11/2025    PHOS 3.4 02/11/2025      COAG:   Recent Labs     02/10/25  1507 07/30/24  1617 06/17/24  1555   INR 1.1 1.0 1.0     CRP: No results found for: \"CRP\"   [unfilled]   ENDO:  Recent Labs     12/10/24  1355 07/19/24  0826 02/21/24  1548   TSH  --   --  1.10   HGBA1C 8.2* 6.9* 9.0*      CARDIAC:   Recent Labs     02/10/25  1507   TROPHS 11   @CVLABRCNT(CHOL:3,LDLF:3,LDL:3,LDLCALC:3,HDL:3,TRIG:3)    Micro/ID:   No results found for the last 90 days.                   No lab exists for component: \"AGALPCRNB\"RESUFAST(URINECULTURE,BLOODCULT,CSFCULTSMEAR)@    Images:  ECG 12 lead  Normal sinus rhythm  Nonspecific intraventricular block  Nonspecific T wave abnormality  Abnormal ECG  When compared with ECG of 19-JUL-2024 08:44,  Fusion complexes are no longer Present  Questionable change in QRS duration       Medications:  Scheduled medications  [Held by provider] amLODIPine, 5 mg, oral, Daily  [Held by provider] empagliflozin, 10 mg, oral, Daily  [Held by provider] glimepiride, 2 mg, oral, Daily before breakfast  guaiFENesin, 600 mg, oral, BID  heparin (porcine), 5,000 Units, subcutaneous, q8h  insulin lispro, 0-10 Units, subcutaneous, TID AC  [Held by provider] losartan, 100 mg, oral, Daily  ondansetron, 4 mg, intravenous, Once  oseltamivir, 30 mg, oral, BID  rosuvastatin, 20 mg, oral, Daily  zinc oxide, 1 Application, Topical, TID      Continuous medications     PRN medications  PRN medications: acetaminophen **OR** acetaminophen **OR** acetaminophen, ondansetron **OR** ondansetron "     Assessment and Plan:  Vee Pimentel is a 74 y.o. female with a PMH of Vee Pimentel is a 74 y.o. female with a PMH of NPH (s/p  shunt placement), HLD, HTN, vertigo, T2D, chronic sinusitis, HFpEF, MARISELA, glaucoma admitted for LESLEE in setting of influenza A infection.     Acute Medical Issues:  #Acute Kidney Injury RESOLVED  - In the setting of influenza A infection, diarrhea, and poor PO intake  - Baseline Cr. 0.6, 1.22 on admission presentation  - S/P 1L fluid bolus in ED, 1L on floor- 2L total this admission  Plan:  - recheck RFP tomorrow  - avoid nephrotoxic medications  - strict I/Os     #Influenza A infection  - No imaging evidence of consolidative pneumonia  Plan:  - Tamiflu w/ Renal dosing  - guaifenesin     #Chronic diarrhea Improved  #Dehydration  - hx of prior antibiotic usage several months ago around the time of onset  - hx of frequent supplement use  Plan:  - stool pathogen panel  - fluid management per above  - consult to wound care, appreciate recs  - urinalysis     #Bilateral LE edema, R>L  -amlodipine 5mg likely not significant enough dose to cause peripheral edema  - Echo from 3/4/24 with EF 60-65%, impair LV diastolic function  Plan:  - DVT ultrasound pending final read     Chronic Medical Issues:   #Type 2 diabetes- sliding scale insulin  #HTN- reported taking home medications: losartan and amlodipine  #HLD- continue crestor     Fluids: prn  Electrolytes: replete as needed  Nutrition: Adult CC  GI Prophylaxis: none  DVT Prophylaxis: heparin  Access: pIV  Antibiotics: none  Oxygenation: RA     Dispo: To be determined pending PT.OT eval. Likely discharge by tomorrow.     Alf Bennett MD PGY-1

## 2025-02-11 NOTE — DISCHARGE SUMMARY
Discharge Diagnosis  Influenza A  Generalized weakness    Issues Requiring Follow-Up  -  Home care set up for PT/OT  - Complete Tamiflu for 3 more days  - Please STOP taking excessive vitamins.   - Please follow up with PCP as outpatient    Discharge Meds     Medication List      START taking these medications     oseltamivir 30 mg capsule; Commonly known as: Tamiflu; Take 1 capsule   (30 mg) by mouth 2 times a day for 3 days. Do not fill before February 12, 2025.; Start taking on: February 12, 2025     CONTINUE taking these medications     amLODIPine 5 mg tablet; Commonly known as: Norvasc; Take 1 tablet (5 mg)   by mouth once daily.   Blood Glucose Test strip; Generic drug: blood sugar diagnostic; 1 strip   2 times a day.   glimepiride 2 mg tablet; Commonly known as: Amaryl; Take 1 tablet (2 mg)   by mouth once daily in the morning. Take before meals.   losartan 100 mg tablet; Commonly known as: Cozaar; Take 1 tablet (100   mg) by mouth once daily.   rosuvastatin 20 mg tablet; Commonly known as: Crestor; Take 1 tablet (20   mg) by mouth once daily.     ASK your doctor about these medications     empagliflozin 10 mg; Commonly known as: Jardiance; Take 1 tablet (10 mg)   by mouth once daily.       Test Results Pending At Discharge  Pending Labs       Order Current Status    Urine Culture In process            Hospital Course  Vee Pimentel is a 74 y.o. female with a PMH of NPH (s/p  shunt placement), HLD, HTN, vertigo, T2D, chronic sinusitis, HFpEF, MARISELA, glaucoma who presented for abdominal pain, diarrhea, cough, and headache.  She has a several month history of profuse diarrhea and stool incontinence.    She has a 2 to 3-day history of worsening upper respiratory symptoms and productive cough. She has had no fevers but is experiencing chills. She has had poor oral food and water intake. In the ED she is found to be positive for influenza A.  She was also found to have LESLEE with creatinine around 1.2 and mildly  elevated lactate to 2.3.  Both lactate and creatinine improved following fluid repletion in the ED and on the floor. She was noted to have mild leg edema, worse on her Right side, but had a negative DVT ultrasound.    She has no oxygen requirement no leukocytosis to suggest significant infection.  She was started on Tamiflu in the hospital. Patient remained hemodynamically stable and will be discharged on 2/11/25 with following recommendations:  -  Home care set up for PT/OT  - Complete Tamiflu for 3 more days  - Please STOP taking excessive vitamins.   - Please follow up with PCP as outpatient          Pertinent Physical Exam At Time of Discharge  Physical Exam  Constitutional:       Appearance: She is obese.   HENT:      Mouth/Throat:      Mouth: Mucous membranes are moist.   Eyes:      Pupils: Pupils are equal, round, and reactive to light.   Cardiovascular:      Rate and Rhythm: Normal rate and regular rhythm.      Pulses: Normal pulses.      Comments: Distant heart sounds  Pulmonary:      Effort: Pulmonary effort is normal.      Comments: Dry crackles in the upper lobes bilaterally  Abdominal:      General: There is distension.      Tenderness: There is no abdominal tenderness.      Hernia: A hernia is present.      Comments: Small umbilical hernia   Musculoskeletal:      Right lower leg: Edema present.      Left lower leg: Edema present.   Skin:     Capillary Refill: Capillary refill takes less than 2 seconds.   Neurological:      General: No focal deficit present.      Mental Status: She is alert and oriented to person, place, and time    Outpatient Follow-Up  Future Appointments   Date Time Provider Department Center   5/8/2025  1:00 PM Gordy Bhandari DO [id number]         Alf Bennett MD

## 2025-02-11 NOTE — PROGRESS NOTES
"Physical Therapy    Physical Therapy Evaluation & Treatment    Patient Name: Vee Pimentel  MRN: [id number]  Department: [address]  Room: 226/226-A  Today's Date: 2/11/2025   Time Calculation  Start Time: 1447  Stop Time: 1515  Time Calculation (min): 28 min    Assessment/Plan   PT Assessment  PT Assessment Results: Decreased mobility, Decreased strength, Decreased endurance, Impaired balance  Rehab Prognosis: Fair  Barriers to Discharge Home: Caregiver assistance, Physical needs  Caregiver Assistance: Caregiver assistance needed per identified barriers - however, level of patient's required assistance exceeds assistance available at home  Physical Needs: Intermittent mobility assistance needed, Intermittent ADL assistance needed  Evaluation/Treatment Tolerance: Patient limited by fatigue  Medical Staff Made Aware: Yes  End of Session Communication: Bedside nurse, Care Coordinator  End of Session Patient Position:  (seated on BSC; RN present)     Assessment:   Pt is a 75 yo female presenting for PT eval with +Influenza. After long discussion with pt, it appears she has been declining over the past several months, as she was IND with self-care, but now has daily assistance from her niece. Pt does admit to living a sedentary lifestyle, which she states \"I know is not going to help me.\" Encouraged pt to mobilize more frequently here with staff and to ambulate to/from bathroom (vs. Using BSC) for continued practice with FWW. Pt has a large, supportive family who are available to assist her as needed. Pt also reports her son may be moving here from [place] to assist pt/her spouse. Recommend low intensity PT at discharge for home safety assessment, DME/AE needs, and further mobility progression. Pt agreeable to POC at this time.       IP OR SWING BED PT PLAN  Inpatient or Swing Bed: Inpatient  PT Plan  Treatment/Interventions: Bed mobility, Transfer training, Gait training, Strengthening, Endurance training, Balance " training  PT Plan: Ongoing PT  PT Frequency: 3 times per week  PT Discharge Recommendations: Low intensity level of continued care (with increased supervision from family)  Equipment Recommended upon Discharge: Wheeled walker  PT Recommended Transfer Status: Assist x1 (with FWW)  PT - OK to Discharge: Yes      Subjective     General Visit Information:  General  Reason for Referral: Pt is a 75 yo female presenting to [facility] on 2/10/25 with abdominal pain, diarrhea, cough, and headache. Reports several month history of profuse diarrhea and stool incontinence. (+) Influenza A. C.diff rule out. PT consulted for impaired mobility.  Past Medical History Relevant to Rehab: NPH (s/p  shunt placement), HLD, HTN, vertigo, T2D, chronic sinusitis, HFpEF, MARISELA, glaucoma  Family/Caregiver Present: No  Prior to Session Communication: Bedside nurse  Patient Position Received: Bed, 3 rail up, Alarm on  General Comment: Pt agreeable to PT eval. Feels like a BM may be coming.  Home Living:  Home Living  Type of Home: Apartment  Lives With: Alone (Spouse suffered CVA, now at [facility])  Home Adaptive Equipment: Walker rolling or standard, Cane  Home Layout: One level  Home Access: Level entry  Bathroom Shower/Tub: Walk-in shower, Tub/shower unit (reports using WIS)  Bathroom Toilet: Standard  Bathroom Equipment: Shower chair without back  Prior Level of Function:  Prior Function Per Pt/Caregiver Report  Level of [place]: Needs assistance with ADLs, Needs assistance with homemaking  Receives Help From: Family (Niece primarily)  ADL Assistance: Needs assistance (with bathing and LB dressing)  Homemaking Assistance: Needs assistance (niece assists)  Ambulatory Assistance: Independent (primarily without AD; occasionally uses a cane)  Prior Function Comments: Pt's niece comes M-F [phone] to assist with I/ADLs. Reports she cannot reach her cupboards due to short stature. States usually she can bathe/ dress self but has needed more  "help recently. Reports she drives but also cannot see out of L eye and has \"given that to God to heal.\"  Precautions:  Precautions  Medical Precautions: Fall precautions  Precautions Comment: +Influenza (droplet). C.diff rule out. Masimo           Objective   Pain:  Pain Assessment  Pain Assessment: 0-10  0-10 (Numeric) Pain Score: 0 - No pain  Cognition:  Cognition  Overall Cognitive Status: Within Functional Limits  Orientation Level: Oriented X4    General Assessments:  General Observation  General Observation: Copper compression socks on BLE               Activity Tolerance  Endurance: Tolerates 10 - 20 min exercise with multiple rests    Sensation  Light Touch: No apparent deficits    Strength  Strength Comments: BLE at least 3/5  Static Sitting Balance  Static Sitting-Balance Support: Feet supported, Bilateral upper extremity supported  Static Sitting-Level of Assistance: Close supervision  Static Sitting-Comment/Number of Minutes: EOB    Static Standing Balance  Static Standing-Balance Support: Bilateral upper extremity supported  Static Standing-Level of Assistance: Close supervision  Static Standing-Comment/Number of Minutes: with FWW  Functional Assessments:    Bed Mobility  Bed Mobility: Yes  Bed Mobility 1  Bed Mobility 1: Supine to sitting  Level of Assistance 1: Minimum assistance  Bed Mobility Comments 1: HOB elevated; able to advance BLE towards EOB, but required assist at trunk to obtain upright  Bed Mobility 2  Bed Mobility  2: Scooting  Level of Assistance 2: Minimum assistance  Bed Mobility Comments 2: with brian pad    Transfers  Transfer: Yes  Transfer 1  Technique 1: Sit to stand  Transfer Device 1: Walker  Transfer Level of Assistance 1: Close supervision  Trials/Comments 1: VC for ww safety  Transfers 2  Technique 2: Stand to sit  Transfer Device 2: Walker  Transfer Level of Assistance 2: Close supervision  Trials/Comments 2: VC for hand placement, controlled lowering    Ambulation/Gait " Training  Ambulation/Gait Training Performed: Yes  Ambulation/Gait Training 1  Surface 1: Level tile  Device 1: Rolling walker  Assistance 1: Close supervision  Quality of Gait 1: Decreased step length, Shuffling gait  Comments/Distance (ft) 1: 5' to [facility]; originally planned to ambulate to bathroom, but pt with bowel urgency, and was directed to [facility] instead  Extremity/Trunk Assessments:  RLE   RLE : Within Functional Limits  LLE   LLE : Within Functional Limits  Treatments:  Therapeutic Activity  Therapeutic Activity Performed: Yes  Therapeutic Activity 1: Education on discharge planning - increased time required    ADL  ADL's Addressed: Yes  LE Dressing Deficit: Don/doff R shoe, Don/doff L shoe  Toileting Assistance with Device: Minimal      Outcome Measures:  [facility] Basic Mobility  Turning from your back to your side while in a flat bed without using bedrails: None  Moving from lying on your back to sitting on the side of a flat bed without using bedrails: A little  Moving to and from bed to chair (including a wheelchair): A little  Standing up from a chair using your arms (e.g. wheelchair or bedside chair): A little  To walk in hospital room: A little  Climbing 3-5 steps with railing: A lot  Basic Mobility - Total Score: 18    Encounter Problems       Encounter Problems (Active)       Mobility       Patient will ambulate household distance of 50' with LRAD and mod-I.        Start:  02/11/25    Expected End:  02/25/25               PT Transfers       Patient will transfer to and from sit to supine at independent level without use of bed rails.       Start:  02/11/25    Expected End:  02/25/25            Patient will transfer sit to and from stand with LRAD and mod-I.        Start:  02/11/25    Expected End:  02/25/25               Pain - Adult              Education Documentation  Body Mechanics, taught by Di Holm, PT at 2/11/2025  3:33 PM.  Learner: Patient  Readiness: Acceptance  Method: Explanation,  Demonstration  Response: Needs Reinforcement  Comment: Role of acute PT, discharge planning, AD use, safe techniques for mobility    Mobility Training, taught by Di Holm, PT at 2/11/2025  3:33 PM.  Learner: Patient  Readiness: Acceptance  Method: Explanation, Demonstration  Response: Needs Reinforcement  Comment: Role of acute PT, discharge planning, AD use, safe techniques for mobility    Education Comments  No comments found.

## 2025-02-11 NOTE — CONSULTS
Wound Care Consult     Visit Date: 2/11/2025      Patient Name: Vee Pimentel         MRN: [id number]           YOB: 1950     Reason for Consult: chronic diarrhea, patient daughter concerns        Wound History: month long diarrhea     Pertinent Labs:   Albumin   Date Value Ref Range Status   02/11/2025 3.2 (L) 3.4 - 5.0 g/dL Final         Wound Team Summary Assessment: Patient seen in bed.  Patient not currently having diarrhea.  Buttocks not red or excoriated at this time.  Zinc being used and effective.     Wound Team Plan: Continue with Zinc for prevention.  Patient agrees, AYAZ Pritchard at bedside, in agreement. Educated patient to turn q 2 hours for PI prevention.     Janey Mccoy RN, [facility]  2/11/2025  11:56 AM

## 2025-02-11 NOTE — PROGRESS NOTES
Occupational Therapy    Evaluation/Treatment    Patient Name: Vee Pimentel  MRN: [id number]  Department: [address]  Room: [facility]  Today's Date: 02/11/25  Time Calculation  Start Time: 1110  Stop Time: 1132  Time Calculation (min): 22 min       Assessment:  OT Assessment: pt is below baseline in self care and fxnl mob. it appears she has been functionally declining for some time. pt would benefit from mod intensity OT at dc to maximize independence and safety with fxnl mob and self care.  Prognosis: Good  Barriers to Discharge Home: Caregiver assistance, Physical needs  Caregiver Assistance: Caregiver assistance needed per identified barriers - however, level of patient's required assistance exceeds assistance available at home  Physical Needs: 24hr mobility assistance needed, Intermittent ADL assistance needed, High falls risk due to function or environment  Evaluation/Treatment Tolerance: Patient tolerated treatment well  Medical Staff Made Aware: Yes  End of Session Communication: Bedside nurse  End of Session Patient Position: Bed, 3 rail up, Alarm on  OT Assessment Results: Decreased ADL status, Decreased endurance, Decreased IADLs, Decreased functional mobility  Prognosis: Good  Barriers to Discharge: Decreased caregiver support  Evaluation/Treatment Tolerance: Patient tolerated treatment well  Medical Staff Made Aware: Yes  Strengths: Ability to acquire knowledge, Housing layout  Barriers to Participation: Comorbidities, Coping skills, Insight into problems  Plan:  Treatment Interventions: ADL retraining, Functional transfer training, Endurance training, Patient/family training, Neuromuscular reeducation, Equipment evaluation/education  OT Frequency: 3 times per week  OT Discharge Recommendations: Moderate intensity level of continued care  Equipment Recommended upon Discharge: Wheeled walker  OT Recommended Transfer Status: Minimal assist, Assist of 1  OT - OK to Discharge: Yes  Treatment Interventions: ADL  retraining, Functional transfer training, Endurance training, Patient/family training, Neuromuscular reeducation, Equipment evaluation/education    Subjective   Current Problem:  1. Influenza A        2. Lactate blood increase        3. Acute kidney injury (CMS-HCC)        4. Leg edema  Vascular US lower extremity venous duplex bilateral    Vascular US lower extremity venous duplex bilateral        General:   OT Received On: 02/11/25  General  Reason for Referral: Pt is a 75 y/o F admitted with influenza A, currently waiting for sample to rule out c-diff  Referred By: Jo-Ann Marie MD  Past Medical History Relevant to Rehab   Past Medical History:   Diagnosis Date    Anxiety     Arthritis     Basal cell carcinoma of leg, left     Cataract     Depression     Glaucoma     Hyperlipidemia     Hypertension     NPH (normal pressure hydrocephalus) (Multi)     Obesity     Sleep apnea     Type 2 diabetes mellitus     Urinary incontinence     Urinary retention     Vision loss     left eye fibrosis     Family/Caregiver Present: No  Prior to Session Communication: Bedside nurse  Patient Position Received: Bed, 3 rail up, Alarm on  Preferred Learning Style: auditory, kinesthetic, visual, verbal  General Comment: agreeable to OT eval, oriented but appears to be cognitively impaired   Precautions:  Medical Precautions: Fall precautions, Infection precautions (contact and droplet precautions)    Pain:  Pain Assessment  0-10 (Numeric) Pain Score: 0 - No pain    Objective   Cognition:  Orientation Level: Oriented X4  Insight: Moderate  Impulsive: Mildly     Home Living:  Type of Home: Apartment  Lives With: Spouse  Home Adaptive Equipment: Cane, Walker rolling or standard (occ uses cane, primarily does not)  Home Layout: One level  Home Access: Level entry  Bathroom Shower/Tub: Walk-in shower, Tub/shower unit (uses walk in)  Bathroom Toilet: Standard  Bathroom Equipment: Shower chair without back  Prior Function:  Level of  "[place]: Needs assistance with ADLs, Needs assistance with homemaking  Receives Help From: Family  ADL Assistance: Needs assistance  Homemaking Assistance: Needs assistance  Ambulatory Assistance: Independent (occ can usage)  Vocational: Retired  Prior Function Comments: Pt's maeagn is currently in rehab s/p CVA. Niece comes M-F [phone] to assist them with ADLs/ iADLs. Reports she cannot reach her cupboards due to short stature. States usually she can bathe/ dress self but has needed more help recently. Reports she drives but also cannot see out of L eye and has \"given that to God to heal.\"    ADL:  Eating Assistance: Independent  Grooming Assistance: Stand by  Grooming Deficit: Setup  Bathing Assistance: Maximal  Bathing Deficit: Left lower leg including foot, Right lower leg including foot, Left upper leg, Right upper leg, Buttocks, Perineal area, Panniculus  UE Dressing Assistance: Minimal  UE Dressing Deficit: Pull around back, Pull down in back  LE Dressing Assistance: Maximal  LE Dressing Deficit: Don/doff L shoe, Don/doff R shoe, Thread LLE into underwear, Thread RLE into underwear, Thread LLE into pants, Thread RLE into pants, Don/doff L sock, Don/doff R sock  Toileting Assistance with Device: Total  Toileting Deficit: Incontinent, Perineal hygiene, Clothing management down, Clothing management up  Activity Tolerance:  Endurance: Tolerates 10 - 20 min exercise with multiple rests  Functional Standing Tolerance:     Bed Mobility/Transfers: Bed Mobility  Bed Mobility: Yes  Bed Mobility 1  Bed Mobility 1: Supine to sitting  Level of Assistance 1: Close supervision  Bed Mobility Comments 1: HOB elevated  Bed Mobility 2  Bed Mobility  2: Sitting to supine  Level of Assistance 2: Minimum assistance  Bed Mobility Comments 2: assist with LEs    Transfers  Transfer: Yes  Transfer 1  Transfer From 1: Bed to  Transfer to 1: Stand  Technique 1: Sit to stand, Stand to sit  Transfer Device 1: Walker  Transfer Level " of Assistance 1: Minimum assistance  Trials/Comments 1: cues for hand placement. sequencing    Functional Mobility:  Functional Mobility  Functional Mobility Performed: Yes  Functional Mobility 1  Surface 1: Level tile  Device 1: Rolling walker  Assistance 1: Contact guard  Comments 1: side steppin along bed to R towards HOB  Sitting Balance:  Static Sitting Balance  Static Sitting-Balance Support: Feet supported  Static Sitting-Level of Assistance: Independent  Standing Balance:  Static Standing Balance  Static Standing-Balance Support: Bilateral upper extremity supported  Static Standing-Level of Assistance: Contact guard  Dynamic Standing Balance  Dynamic Standing-Balance Support: Bilateral upper extremity supported  Dynamic Standing-Level of Assistance: Minimum assistance  Dynamic Standing-Balance: Turning    Vision:Vision - Basic Assessment  Patient Visual Report:  (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 that she is letting God heal because she has had interventions and they haven't been successful)  Sensation:  Light Touch: No apparent deficits  Stereognosis: No apparent deficits  Coordination:  Movements are Fluid and Coordinated: Yes   Hand Function:  Hand Function  Gross Grasp: Functional  Coordination: Functional  Extremities: RUE   RUE : Within Functional Limits and LUE   LUE: Within Functional Limits    Outcome Measures: [facility] Daily Activity  Putting on and taking off regular lower body clothing: A lot  Bathing (including washing, rinsing, drying): A lot  Putting on and taking off regular upper body clothing: A little  Toileting, which includes using toilet, bedpan or urinal: Total  Taking care of personal grooming such as brushing teeth: A little  Eating Meals: None  Daily Activity - Total Score: 15    Education Documentation  Body Mechanics, taught by Felicia Rod OT at 2/11/2025  2:35 PM.  Learner: Patient  Readiness: Acceptance  Method: Explanation  Response: Verbalizes Understanding    Precautions, taught by  Felicia Rod OT at 2/11/2025  2:35 PM.  Learner: Patient  Readiness: Acceptance  Method: Explanation  Response: Verbalizes Understanding    ADL Training, taught by Felicia Rod OT at 2/11/2025  2:35 PM.  Learner: Patient  Readiness: Acceptance  Method: Explanation  Response: Verbalizes Understanding    Education Comments  No comments found.      Goals:  Encounter Problems       Encounter Problems (Active)       ADLs       Patient with complete upper body dressing with set-up level of assistance donning and doffing all UE clothes with no adaptive equipment while edge of bed        Start:  02/11/25    Expected End:  02/25/25            Patient with complete lower body dressing with stand by assist level of assistance donning and doffing all LE clothes  with PRN adaptive equipment while edge of bed        Start:  02/11/25    Expected End:  02/25/25            Patient will complete daily grooming tasks brushing teeth and washing face/hair with stand by assist level of assistance and PRN adaptive equipment while standing.       Start:  02/11/25    Expected End:  02/25/25            Patient will complete toileting including hygiene clothing management/hygiene with minimal assist  level of assistance and grab bars.       Start:  02/11/25    Expected End:  02/25/25               MOBILITY       Patient will perform Functional mobility mod  Household distances/Community Distances with stand by assist level of assistance and least restrictive device in order to improve safety and functional mobility.       Start:  02/11/25    Expected End:  02/25/25               TRANSFERS       Patient will perform bed mobility independent level of assistance and bed rails in order to improve safety and independence with mobility       Start:  02/11/25    Expected End:  02/25/25            Patient will complete functional transfer to chair with arms with least restrictive device with supervision level of assistance.       Start:   02/11/25    Expected End:  02/25/25

## 2025-02-11 NOTE — DISCHARGE INSTRUCTIONS
1) Please, take your home medications as instructed after being discharged from the hospital.    New medications on discharge:  - Tamiflu for 3 days twice daily  -  Home care set up for PT/OT  - Please STOP taking excessive vitamins.   - Please follow up with PCP as outpatient    2) Please, follow-up with your primary care provider within 7 to 14 days after leaving the hospital. /appointment services has been requested to make an appointment for you, however if you do not hear back from them within 1 to 2 days, please call your primary physician's office to schedule an appointment. Bring your photo ID and insurance card to your appointment.   [facility]  services can be reached at [phone] and appointment services can be reached at [phone].    - Please, call   or appointment services and schedule a follow-up with your PCP within 1-2 weeks after you leave the hospital.    3) If you experience any worsening symptoms or have any concerns, please contact your primary care provider to schedule an appointment. If you cannot get in touch with your primary care physician, please return to the nearest emergency room or urgent care clinic for an evaluation and treatment.    Thank you for choosing [facility] and allowing us to partake in your medical care!    - Your primary care inpatient team.

## 2025-02-12 ENCOUNTER — DOCUMENTATION (OUTPATIENT)
Dept: HOME HEALTH SERVICES | Facility: HOME HEALTH | Age: 75
End: 2025-02-12
Payer: MEDICARE

## 2025-02-12 LAB
ATRIAL RATE: 70 BPM
P AXIS: 23 DEGREES
P OFFSET: 208 MS
P ONSET: 156 MS
PR INTERVAL: 142 MS
Q ONSET: 227 MS
QRS COUNT: 11 BEATS
QRS DURATION: 130 MS
QT INTERVAL: 480 MS
QTC CALCULATION(BAZETT): 518 MS
QTC FREDERICIA: 505 MS
R AXIS: 42 DEGREES
T AXIS: 43 DEGREES
T OFFSET: 467 MS
VENTRICULAR RATE: 70 BPM

## 2025-02-12 NOTE — HH CARE COORDINATION
Home Care received a Referral for Physical Therapy and Occupational Therapy. We have processed the referral for a Start of Care on 02/13-02/14.     If you have any questions or concerns, please feel free to contact us at [phone]. Follow the prompts, enter your five digit zip code, and you will be directed to your care team on E[facility].    
If you are a smoker, it is important for your health to stop smoking. Please be aware that second hand smoke is also harmful.

## 2025-02-25 ENCOUNTER — HOME CARE VISIT (OUTPATIENT)
Dept: HOME HEALTH SERVICES | Facility: HOME HEALTH | Age: 75
End: 2025-02-25
Payer: MEDICARE

## 2025-02-25 VITALS
BODY MASS INDEX: 37.79 KG/M2 | DIASTOLIC BLOOD PRESSURE: 80 MMHG | HEIGHT: 58 IN | SYSTOLIC BLOOD PRESSURE: 130 MMHG | WEIGHT: 180 LBS | OXYGEN SATURATION: 97 % | HEART RATE: 89 BPM | TEMPERATURE: 97.9 F

## 2025-02-25 VITALS — RESPIRATION RATE: 18 BRPM

## 2025-02-25 PROCEDURE — 169592 NO-PAY CLAIM PROCEDURE

## 2025-02-25 PROCEDURE — G0151 HHCP-SERV OF PT,EA 15 MIN: HCPCS | Mod: HHH

## 2025-02-25 PROCEDURE — G0152 HHCP-SERV OF OT,EA 15 MIN: HCPCS | Mod: HHH

## 2025-02-25 SDOH — HEALTH STABILITY: PHYSICAL HEALTH: EXERCISE TYPE: SEATED

## 2025-02-25 SDOH — HEALTH STABILITY: PHYSICAL HEALTH: EXERCISE COMMENTS: PATIENT INSTRUCTED IN AND COMPLETES 10 REPS SEATED AP, LAQ, MARCHING TOL WELL

## 2025-02-25 ASSESSMENT — ENCOUNTER SYMPTOMS
PAIN LOCATION - PAIN SEVERITY: 2/10
PAIN LOCATION - PAIN QUALITY: ACHE
PAIN SEVERITY GOAL: 0/10
PAIN LOCATION - PAIN FREQUENCY: INTERMITTENT
LOWEST PAIN SEVERITY IN PAST 24 HOURS: 0/10
HIGHEST PAIN SEVERITY IN PAST 24 HOURS: 4/10
PERSON REPORTING PAIN: PATIENT
SUBJECTIVE PAIN PROGRESSION: UNCHANGED
SUBJECTIVE PAIN PROGRESSION: GRADUALLY IMPROVING
PAIN LOCATION - EXACERBATING FACTORS: WALKING
MUSCLE WEAKNESS: 1
PAIN: 1
PAIN: 1
LOWEST PAIN SEVERITY IN PAST 24 HOURS: 0/10
PERSON REPORTING PAIN: PATIENT
PAIN LOCATION: BACK
PAIN LOCATION - RELIEVING FACTORS: REST
HIGHEST PAIN SEVERITY IN PAST 24 HOURS: 2/10
OCCASIONAL FEELINGS OF UNSTEADINESS: 1

## 2025-02-25 ASSESSMENT — ACTIVITIES OF DAILY LIVING (ADL)
TOILETING: INDEPENDENT
BATHING ASSESSED: 1
AMBULATION ASSISTANCE ON FLAT SURFACES: 1
PHYSICAL TRANSFERS ASSESSED: 1
DRESSING_LB_CURRENT_FUNCTION: INDEPENDENT
AMBULATION ASSISTANCE: 1
GROOMING_WITHIN_DEFINED_LIMITS: 1
FEEDING_WITHIN_DEFINED_LIMITS: 1
ENTERING_EXITING_HOME: CONTACT GUARD ASSIST
CURRENT_FUNCTION: SUPERVISION
BATHING_CURRENT_FUNCTION: INDEPENDENT
OASIS_M1830: 02
TOILETING: 1
AMBULATION ASSISTANCE: SUPERVISION

## 2025-02-25 ASSESSMENT — PAIN SCALES - PAIN ASSESSMENT IN ADVANCED DEMENTIA (PAINAD)
TOTALSCORE: 0
FACIALEXPRESSION: 0 - SMILING OR INEXPRESSIVE.
NEGVOCALIZATION: 0 - NONE.
FACIALEXPRESSION: 0
NEGVOCALIZATION: 0
BREATHING: 0
BODYLANGUAGE: 0 - RELAXED.
CONSOLABILITY: 0 - NO NEED TO CONSOLE.
BODYLANGUAGE: 0
CONSOLABILITY: 0

## 2025-03-03 ENCOUNTER — HOME CARE VISIT (OUTPATIENT)
Dept: HOME HEALTH SERVICES | Facility: HOME HEALTH | Age: 75
End: 2025-03-03
Payer: MEDICARE

## 2025-03-03 VITALS
TEMPERATURE: 97.7 F | RESPIRATION RATE: 20 BRPM | HEART RATE: 88 BPM | DIASTOLIC BLOOD PRESSURE: 80 MMHG | SYSTOLIC BLOOD PRESSURE: 150 MMHG | OXYGEN SATURATION: 99 %

## 2025-03-03 PROCEDURE — G0151 HHCP-SERV OF PT,EA 15 MIN: HCPCS | Mod: HHH

## 2025-03-05 ENCOUNTER — HOME CARE VISIT (OUTPATIENT)
Dept: HOME HEALTH SERVICES | Facility: HOME HEALTH | Age: 75
End: 2025-03-05
Payer: MEDICARE

## 2025-03-05 VITALS
HEART RATE: 102 BPM | SYSTOLIC BLOOD PRESSURE: 140 MMHG | DIASTOLIC BLOOD PRESSURE: 70 MMHG | RESPIRATION RATE: 18 BRPM | OXYGEN SATURATION: 97 %

## 2025-03-05 PROCEDURE — G0151 HHCP-SERV OF PT,EA 15 MIN: HCPCS | Mod: HHH

## 2025-03-05 ASSESSMENT — ENCOUNTER SYMPTOMS
PERSON REPORTING PAIN: PATIENT
DENIES PAIN: 1

## 2025-03-10 ENCOUNTER — HOME CARE VISIT (OUTPATIENT)
Dept: HOME HEALTH SERVICES | Facility: HOME HEALTH | Age: 75
End: 2025-03-10
Payer: MEDICARE

## 2025-03-10 PROCEDURE — G0151 HHCP-SERV OF PT,EA 15 MIN: HCPCS | Mod: HHH

## 2025-03-10 ASSESSMENT — ENCOUNTER SYMPTOMS
PAIN LOCATION - PAIN QUALITY: ACHE
HIGHEST PAIN SEVERITY IN PAST 24 HOURS: 4/10
PERSON REPORTING PAIN: PATIENT
PAIN: 1
PAIN LOCATION: BACK
PAIN LOCATION - EXACERBATING FACTORS: WALKING
PAIN LOCATION - PAIN FREQUENCY: WITH ACTIVITY
PAIN LOCATION - PAIN SEVERITY: 3/10
SUBJECTIVE PAIN PROGRESSION: GRADUALLY IMPROVING
LOWEST PAIN SEVERITY IN PAST 24 HOURS: 0/10

## 2025-03-12 ENCOUNTER — HOME CARE VISIT (OUTPATIENT)
Dept: HOME HEALTH SERVICES | Facility: HOME HEALTH | Age: 75
End: 2025-03-12
Payer: MEDICARE

## 2025-03-12 VITALS
SYSTOLIC BLOOD PRESSURE: 140 MMHG | RESPIRATION RATE: 18 BRPM | HEART RATE: 87 BPM | TEMPERATURE: 98 F | DIASTOLIC BLOOD PRESSURE: 84 MMHG | OXYGEN SATURATION: 95 %

## 2025-03-12 PROCEDURE — G0151 HHCP-SERV OF PT,EA 15 MIN: HCPCS | Mod: HHH

## 2025-03-12 SDOH — HEALTH STABILITY: PHYSICAL HEALTH: EXERCISE TYPE: SEATED AND STANDING

## 2025-03-12 SDOH — HEALTH STABILITY: PHYSICAL HEALTH
EXERCISE COMMENTS: PATIENT INSTRUCTED IN AND COMPLETES 10 REPS SEATED AP, LAQ, MARCHING TOL WELL  PATIENT INSTRUCTED IN AND COMPLETES 10 REPS STD HEEL/TOE RAISES, MARCHING, HIP ABD, EXT, KNEE FLEXION, MINISUQATS TOL WELL.

## 2025-03-12 ASSESSMENT — ACTIVITIES OF DAILY LIVING (ADL)
AMBULATION ASSISTANCE ON FLAT SURFACES: 1
OASIS_M1830: 01
AMBULATION ASSISTANCE: 1
PHYSICAL TRANSFERS ASSESSED: 1
HOME_HEALTH_OASIS: 00
AMBULATION ASSISTANCE: INDEPENDENT
CURRENT_FUNCTION: INDEPENDENT

## 2025-03-12 ASSESSMENT — ENCOUNTER SYMPTOMS
PERSON REPORTING PAIN: PATIENT
DENIES PAIN: 1

## 2025-03-12 NOTE — CASE COMMUNICATION
DISCHARGE SUMMARY:    DISCIPLINE: Physical Therapy  DATE OF DISCIPLINE DISCHARGE: 3/12/25  REASON FOR DISCHARGE: GOALS MET  COORDINATION NOTE: PATIENT I IN HEP, I HOUSEHOLD AMB WTIH OR WO QUAD CANE, S OUTDOOR AMB WITH QUAD CANE.  EVALUATION OF GOALS: ALL GOALS MET  SUMMARY OF CARE PROVIDED: PATIENT INSTRUCTED IN SAFE TRANSFER TECHNIQUE, GAIT TRAINGING WITH QUAD CANE, STRENGTH TRAINING, HEP, BALANCE TRAINING, FALLS PREVENTION REAINING, P AIN MANAGEMENT, HOME SAFETY EVAL.  DISCHARGE INSTRUCTIONS GIVEN: CONTINUE WITH HEP TWICE DAILY AS TOLERATED, USE ROLLATOR AT ALL TIMES.  SERVICES REMAINING: NONE  NOMNC OBTAINED: YES

## 2025-04-01 ENCOUNTER — APPOINTMENT (OUTPATIENT)
Dept: OTOLARYNGOLOGY | Facility: CLINIC | Age: 75
End: 2025-04-01
Payer: MEDICARE

## 2025-05-06 ENCOUNTER — CLINICAL SUPPORT (OUTPATIENT)
Dept: AUDIOLOGY | Facility: CLINIC | Age: 75
End: 2025-05-06
Payer: MEDICARE

## 2025-05-06 ENCOUNTER — APPOINTMENT (OUTPATIENT)
Dept: OTOLARYNGOLOGY | Facility: CLINIC | Age: 75
End: 2025-05-06
Payer: MEDICARE

## 2025-05-06 VITALS — WEIGHT: 178 LBS | BODY MASS INDEX: 37.36 KG/M2 | HEIGHT: 58 IN

## 2025-05-06 DIAGNOSIS — R09.82 POST-NASAL DRIP: Primary | ICD-10-CM

## 2025-05-06 DIAGNOSIS — J32.9 CHRONIC SINUSITIS, UNSPECIFIED LOCATION: ICD-10-CM

## 2025-05-06 DIAGNOSIS — R09.81 NASAL CONGESTION: ICD-10-CM

## 2025-05-06 DIAGNOSIS — J34.89 PAIN OF MAXILLARY SINUS: ICD-10-CM

## 2025-05-06 DIAGNOSIS — R42 DIZZINESS: Primary | ICD-10-CM

## 2025-05-06 DIAGNOSIS — H93.11 TINNITUS OF RIGHT EAR: ICD-10-CM

## 2025-05-06 DIAGNOSIS — R42 VERTIGO: ICD-10-CM

## 2025-05-06 PROCEDURE — 4010F ACE/ARB THERAPY RXD/TAKEN: CPT | Performed by: NURSE PRACTITIONER

## 2025-05-06 PROCEDURE — 3008F BODY MASS INDEX DOCD: CPT | Performed by: NURSE PRACTITIONER

## 2025-05-06 PROCEDURE — 1036F TOBACCO NON-USER: CPT | Performed by: NURSE PRACTITIONER

## 2025-05-06 PROCEDURE — 92557 COMPREHENSIVE HEARING TEST: CPT | Performed by: AUDIOLOGIST

## 2025-05-06 PROCEDURE — 1159F MED LIST DOCD IN RCRD: CPT | Performed by: NURSE PRACTITIONER

## 2025-05-06 PROCEDURE — 1160F RVW MEDS BY RX/DR IN RCRD: CPT | Performed by: NURSE PRACTITIONER

## 2025-05-06 PROCEDURE — 99204 OFFICE O/P NEW MOD 45 MIN: CPT | Performed by: NURSE PRACTITIONER

## 2025-05-06 PROCEDURE — 92567 TYMPANOMETRY: CPT | Performed by: AUDIOLOGIST

## 2025-05-06 ASSESSMENT — PATIENT HEALTH QUESTIONNAIRE - PHQ9
2. FEELING DOWN, DEPRESSED OR HOPELESS: SEVERAL DAYS
SUM OF ALL RESPONSES TO PHQ9 QUESTIONS 1 AND 2: 2
1. LITTLE INTEREST OR PLEASURE IN DOING THINGS: SEVERAL DAYS
10. IF YOU CHECKED OFF ANY PROBLEMS, HOW DIFFICULT HAVE THESE PROBLEMS MADE IT FOR YOU TO DO YOUR WORK, TAKE CARE OF THINGS AT HOME, OR GET ALONG WITH OTHER PEOPLE: SOMEWHAT DIFFICULT

## 2025-05-06 NOTE — PROGRESS NOTES
Subjective   Patient ID: Vee Pimentel is a 75 y.o. female who presents for Vertigo.  HPI  This patient is referred for evaluation of  episodic vertiginous positional dizziness. The patient is  accompanied by a caregiver. The approximate duration of her complaints is months.    The patient describes her dizziness as vertigo triggered by lying in bed or with quick head movements.  She also reports a longstanding history of imbalance which is different than what she is currently experiencing.  She endorses history of normal pressure hydrocephalus and [place].  When asked about ear pain, headache, phono-photophobia, visual or motion intolerance, sound or pressure induced symptoms, hearing loss, discharge from ear, tinnitus, aural fullness or autophony, the patient admits to headaches, phono and photophobia, occasional mild bilateral tinnitus.  She also endorses longstanding history of nasal congestion, maxillary sinus pain and postnasal drip.  Previously, she did well with Claritin but it stopped working for her.  She also reports nasal sprays in the past which she felt caused swelling to her upper lip.  She is hesitant to try anything new.  She also reports history of glaucoma.     When asked about a significant past otological history including history of prior ear surgery, noise exposure, exposure to ototoxic drugs or agents, and/or family history of hearing loss, the patient admits to recreational noise exposure.    Review of Systems  A comprehensive or 10 points review of the patient's constitutional, neurological, HEENT, pulmonary, cardiovascular and genito-urinary systems showed only those mentioned in history of present illness.    Objective   Physical Exam  Constitutional: no fever, chills, weight loss or weight gain   General appearance: Appears well, well-nourished, well groomed. No acute distress.   Communication: Normal communication   Psychiatric: Oriented to person,  place and time. Normal mood and affect.   Neurologic: Cranial nerves II-XII grossly intact and symmetric bilaterally.   Head and Face:   Head: Atraumatic with no masses, lesions or scarring.   Face: Normal symmetry, no paralysis, synkinesis or facial tic. No scars or deformities.   TMJ: Normal, no trismus.   Eyes: Conjunctiva not edematous or erythematous   Ears: External inspection of ears with no deformity, scars or masses. Bilateral EACs clear and bilateral TMs intact with no signs of effusions   Nose: External inspection of nose: No nasal lesions, lacerations or scars.   Neck: Normal appearing, symmetric, trachea midline.   Cardiovascular: Examination of peripheral vascular system shows no clubbing or cyanosis.   Respiratory: No respiratory distress increased work of breathing. Inspection of the chest with symmetric chest expansion and normal respiratory effort.   Skin: No rashes in the head or neck    Bedside occulomotor function assessment for ocular pursuits and saccades, spontaneous nystagmus was normal.  Bilateral head thrust negative  Romberg normal  Fukuda unsteady but no turning  Tandem gait very unsteady, patient immediately grabbing onto the counter  [place]-Hallpike negative bilaterally    My interpretation of the audiogram done today is normal hearing with excellent word recognition scores and normal tympanograms bilaterally.  Assessment/Plan     This patient presents for initial evaluation of chronic acquired vertigo, nasal congestion, postnasal drip and maxillary sinus pain.       Reassurance given that otologic exam and audiogram today are both normal.  The physiology of balance control was explained. The likely possible etiologies were reviewed. We discussed that her description of symptoms is most concerning for BPPV, but testing today was negative for that.  I recommended balance function testing.  I am happy to discuss those results over the phone.  Our office will also get her scheduled to see  one of my rhinology colleagues.  Patient is in agreement with the plan.  All questions were answered to patient's satisfaction.    45 minutes was spent on this patient's visit. More than 50% of that time was spent in counseling regarding the possible etiologies, test results, treatment options and coordinating care.      This note was created using speech recognition transcription software. Despite proofreading, several typographical errors might be present that might affect the meaning of the content. Please call with any questions.      BIBI Alonso-CNP 05/06/25 3:41 PM

## 2025-05-06 NOTE — PROGRESS NOTES
Chief Complaint   Patient presents with    Dizziness    Tinnitus       HISTORY:  Vee Pimentel, age 75 years, was seen for audiogram in conjunction with otolaryngology appointment on 5/6/2025.  Ms. Pimentel reports onset of dizziness and vertigo beginning over five years ago.  Her last audiogram completed in [place] about three years ago indicated normal hearing both ears.  Ms. Pimentel reports normal hearing levels today. She does note periodic tinnitus in the right ear following brain shunt.  There is no history of ear pain, ear pressure, middle ear pathology or ear surgery.    RESULTS:  Prior to testing both external auditory canals were clear and tympanic membranes visualized    Immittance and acoustic reflexes:  Immittance testing yielded TYPE A tympanograms indicating normal middle ear function both ears  Acoustic reflexes were not tested    Audiogram:  Normal hearing levels were obtained 125 - 8000 Hz both ears  Speech reception thresholds obtained at 10 dBHL both ears  Speech discrimination scores were 100% at 40 dBHL    IMPRESSIONS:  Normal middle ear function noted both ears  Normal hearing levels both ears    RECOMMENDATIONS:  1.  Follow up with otolaryngology  2.  Retest hearing levels as needed    time: 4001 - 8998

## 2025-05-08 ENCOUNTER — APPOINTMENT (OUTPATIENT)
Dept: PRIMARY CARE | Facility: CLINIC | Age: 75
End: 2025-05-08
Payer: MEDICARE

## 2025-05-08 VITALS
SYSTOLIC BLOOD PRESSURE: 152 MMHG | HEIGHT: 60 IN | DIASTOLIC BLOOD PRESSURE: 94 MMHG | HEART RATE: 108 BPM | BODY MASS INDEX: 35.53 KG/M2 | WEIGHT: 181 LBS | OXYGEN SATURATION: 97 %

## 2025-05-08 DIAGNOSIS — E11.65 TYPE 2 DIABETES MELLITUS WITH HYPERGLYCEMIA, WITHOUT LONG-TERM CURRENT USE OF INSULIN: Primary | ICD-10-CM

## 2025-05-08 DIAGNOSIS — I10 PRIMARY HYPERTENSION: ICD-10-CM

## 2025-05-08 PROCEDURE — RXMED WILLOW AMBULATORY MEDICATION CHARGE

## 2025-05-08 RX ORDER — GLIMEPIRIDE 2 MG/1
2 TABLET ORAL
Qty: 30 TABLET | Refills: 0 | Status: SHIPPED | OUTPATIENT
Start: 2025-05-08 | End: 2025-06-07

## 2025-05-08 RX ORDER — LOSARTAN POTASSIUM 100 MG/1
100 TABLET ORAL DAILY
Qty: 30 TABLET | Refills: 0 | Status: SHIPPED | OUTPATIENT
Start: 2025-05-08 | End: 2025-06-07

## 2025-05-08 RX ORDER — GLIMEPIRIDE 2 MG/1
2 TABLET ORAL
Qty: 30 TABLET | Refills: 0 | Status: SHIPPED | OUTPATIENT
Start: 2025-05-08 | End: 2025-05-08

## 2025-05-08 RX ORDER — AMLODIPINE BESYLATE 5 MG/1
5 TABLET ORAL DAILY
Qty: 30 TABLET | Refills: 0 | Status: SHIPPED | OUTPATIENT
Start: 2025-05-08 | End: 2025-06-07

## 2025-05-08 NOTE — PROGRESS NOTES
"Chief Complaint   Patient presents with    Follow-up     - patient has been off of all medications for 1 month     Subjective     - Patient presents for follow-up on diabetes management. Reports blood sugars have been \"terrible\" due to inability to obtain medications (jardiance) because of insurance issues with Medicare.  - Reports using wrist blood pressure monitor at home, noting readings in \"orange\" range (elevated).  - Reports inability to tolerate Metformin due to gastrointestinal side effects.  - Reports can only take glimepiride 2mg as 4mg causes rectal bleeding.    Chronic Medical History:  - Diabetes mellitus: last A1c 12/2024 8.2%  - Hypertension: not taking losartan or amlodipine as prescribed due to insurance issues   - Normal pressure hydrocephalus with  shunt placement  - HLD: crestor 10mg    Objective   BP (!) 152/94   Pulse 108   Ht 1.511 m (4' 11.5\")   Wt 82.1 kg (181 lb)   SpO2 97%   BMI 35.95 kg/m²     Physical Exam  Vitals reviewed.   Constitutional:       Appearance: Normal appearance. She is obese.   HENT:      Head: Normocephalic and atraumatic.   Eyes:      Extraocular Movements: Extraocular movements intact.      Pupils: Pupils are equal, round, and reactive to light.   Cardiovascular:      Rate and Rhythm: Normal rate and regular rhythm.      Heart sounds: Normal heart sounds.   Pulmonary:      Effort: Pulmonary effort is normal. No respiratory distress.      Breath sounds: Normal breath sounds.   Neurological:      Mental Status: She is alert and oriented to person, place, and time.   Psychiatric:         Mood and Affect: Mood normal.         Behavior: Behavior normal.       POCT Glucose: 340       Assessment/Plan      1. Diabetes Mellitus  - Assessment: Uncontrolled diabetes with hyperglycemia (POC blood glucose 340)  - Differential diagnosis: Medication non-adherence due to insurance issues  - Investigations planned: A1C, patient to keep log of blood sugar readings  - Treatment " planned: Restart glimepiride 2mg daily for 30 days, start Jardiance for 30 days, referral to clinical pharmacy for medication management  - Relevant referrals: Clinical pharmacy for medication management and follow-up    2. Hypertension  - Assessment: Uncontrolled hypertension  - Treatment planned: Restart losartan 100mg and amlodipine 5mg    Follow-up: Return in 30 days to reassess diabetes management and blood pressure control    Gordy Bhandari DO  PGY-2 Family Medicine

## 2025-05-13 LAB
ALBUMIN SERPL-MCNC: 4.3 G/DL (ref 3.6–5.1)
ALBUMIN/CREAT UR: 45 MG/G CREAT
ALP SERPL-CCNC: 84 U/L (ref 37–153)
ALT SERPL-CCNC: 16 U/L (ref 6–29)
ANION GAP SERPL CALCULATED.4IONS-SCNC: 12 MMOL/L (CALC) (ref 7–17)
AST SERPL-CCNC: 13 U/L (ref 10–35)
BILIRUB SERPL-MCNC: 0.5 MG/DL (ref 0.2–1.2)
BUN SERPL-MCNC: 13 MG/DL (ref 7–25)
CALCIUM SERPL-MCNC: 9.3 MG/DL (ref 8.6–10.4)
CHLORIDE SERPL-SCNC: 102 MMOL/L (ref 98–110)
CHOLEST SERPL-MCNC: 272 MG/DL
CHOLEST/HDLC SERPL: 4.8 (CALC)
CO2 SERPL-SCNC: 24 MMOL/L (ref 20–32)
CREAT SERPL-MCNC: 0.57 MG/DL (ref 0.6–1)
CREAT UR-MCNC: 151 MG/DL (ref 20–275)
EGFRCR SERPLBLD CKD-EPI 2021: 95 ML/MIN/1.73M2
EST. AVERAGE GLUCOSE BLD GHB EST-MCNC: 269 MG/DL
EST. AVERAGE GLUCOSE BLD GHB EST-SCNC: 14.9 MMOL/L
GLUCOSE SERPL-MCNC: 303 MG/DL (ref 65–99)
HBA1C MFR BLD: 11 %
HDLC SERPL-MCNC: 57 MG/DL
LDLC SERPL CALC-MCNC: 176 MG/DL (CALC)
MICROALBUMIN UR-MCNC: 6.8 MG/DL
NONHDLC SERPL-MCNC: 215 MG/DL (CALC)
POTASSIUM SERPL-SCNC: 4 MMOL/L (ref 3.5–5.3)
PROT SERPL-MCNC: 7.2 G/DL (ref 6.1–8.1)
SODIUM SERPL-SCNC: 138 MMOL/L (ref 135–146)
TRIGL SERPL-MCNC: 218 MG/DL

## 2025-05-19 ENCOUNTER — PHARMACY VISIT (OUTPATIENT)
Dept: PHARMACY | Facility: CLINIC | Age: 75
End: 2025-05-19
Payer: MEDICARE

## 2025-05-21 ENCOUNTER — APPOINTMENT (OUTPATIENT)
Dept: OPHTHALMOLOGY | Facility: CLINIC | Age: 75
End: 2025-05-21
Payer: MEDICARE

## 2025-05-21 DIAGNOSIS — Z98.890 HISTORY OF DETACHED RETINA REPAIR: ICD-10-CM

## 2025-05-21 DIAGNOSIS — H40.053 BILATERAL OCULAR HYPERTENSION: ICD-10-CM

## 2025-05-21 DIAGNOSIS — H35.342: ICD-10-CM

## 2025-05-21 DIAGNOSIS — H35.371 EPIRETINAL MEMBRANE (ERM) OF RIGHT EYE: ICD-10-CM

## 2025-05-21 DIAGNOSIS — Z96.1 PSEUDOPHAKIA OF BOTH EYES: ICD-10-CM

## 2025-05-21 DIAGNOSIS — H52.03 HYPEROPIA WITH PRESBYOPIA OF BOTH EYES: Primary | ICD-10-CM

## 2025-05-21 DIAGNOSIS — Z86.69 HISTORY OF DETACHED RETINA REPAIR: ICD-10-CM

## 2025-05-21 DIAGNOSIS — E11.9 TYPE 2 DIABETES MELLITUS WITHOUT RETINOPATHY (MULTI): ICD-10-CM

## 2025-05-21 DIAGNOSIS — H40.053 OCULAR HYPERTENSION, BILATERAL: ICD-10-CM

## 2025-05-21 DIAGNOSIS — H52.4 HYPEROPIA WITH PRESBYOPIA OF BOTH EYES: Primary | ICD-10-CM

## 2025-05-21 PROCEDURE — 92134 CPTRZ OPH DX IMG PST SGM RTA: CPT | Performed by: STUDENT IN AN ORGANIZED HEALTH CARE EDUCATION/TRAINING PROGRAM

## 2025-05-21 PROCEDURE — 92015 DETERMINE REFRACTIVE STATE: CPT | Performed by: STUDENT IN AN ORGANIZED HEALTH CARE EDUCATION/TRAINING PROGRAM

## 2025-05-21 PROCEDURE — 2023F DILAT RTA XM W/O RTNOPTHY: CPT | Performed by: STUDENT IN AN ORGANIZED HEALTH CARE EDUCATION/TRAINING PROGRAM

## 2025-05-21 PROCEDURE — 92004 COMPRE OPH EXAM NEW PT 1/>: CPT | Performed by: STUDENT IN AN ORGANIZED HEALTH CARE EDUCATION/TRAINING PROGRAM

## 2025-05-21 RX ORDER — LATANOPROST 50 UG/ML
1 SOLUTION/ DROPS OPHTHALMIC NIGHTLY
Qty: 7.5 ML | Refills: 3 | Status: SHIPPED | OUTPATIENT
Start: 2025-05-21 | End: 2025-08-19

## 2025-05-21 ASSESSMENT — ENCOUNTER SYMPTOMS
ALLERGIC/IMMUNOLOGIC NEGATIVE: 0
PSYCHIATRIC NEGATIVE: 0
CARDIOVASCULAR NEGATIVE: 0
MUSCULOSKELETAL NEGATIVE: 0
CONSTITUTIONAL NEGATIVE: 0
EYES NEGATIVE: 0
GASTROINTESTINAL NEGATIVE: 0
ENDOCRINE NEGATIVE: 0
NEUROLOGICAL NEGATIVE: 0
RESPIRATORY NEGATIVE: 0
HEMATOLOGIC/LYMPHATIC NEGATIVE: 0

## 2025-05-21 ASSESSMENT — TONOMETRY
OS_IOP_MMHG: 23
OD_IOP_MMHG: 25
IOP_METHOD: GOLDMANN APPLANATION

## 2025-05-21 ASSESSMENT — REFRACTION_MANIFEST
OS_ADD: +2.50
OD_CYLINDER: -0.75
OD_AXIS: 110
OS_CYLINDER: -1.50
OS_AXIS: 045
OD_AXIS: 100
OD_SPHERE: +0.75
OD_CYLINDER: -0.50
METHOD_AUTOREFRACTION: 1
OS_AXIS: 045
OD_SPHERE: +1.00
OD_ADD: +2.50
OS_SPHERE: +1.75
OS_SPHERE: +1.75
OS_CYLINDER: -1.50

## 2025-05-21 ASSESSMENT — CUP TO DISC RATIO
OD_RATIO: .85
OS_RATIO: .85

## 2025-05-21 ASSESSMENT — EXTERNAL EXAM - LEFT EYE: OS_EXAM: NORMAL

## 2025-05-21 ASSESSMENT — VISUAL ACUITY
OS_PH_SC+: -2
OD_SC+: +1
METHOD: SNELLEN - LINEAR
OS_PH_SC: 20/200
OD_SC: 20/25
OS_SC: 20/400

## 2025-05-21 ASSESSMENT — CONF VISUAL FIELD
OD_INFERIOR_NASAL_RESTRICTION: 0
OD_SUPERIOR_TEMPORAL_RESTRICTION: 0
OD_INFERIOR_TEMPORAL_RESTRICTION: 0
OD_SUPERIOR_NASAL_RESTRICTION: 0
OD_NORMAL: 1

## 2025-05-21 ASSESSMENT — SLIT LAMP EXAM - LIDS
COMMENTS: MILD MGD, DERMATOCHALASIS
COMMENTS: MILD MGD, DERMATOCHALASIS

## 2025-05-21 ASSESSMENT — EXTERNAL EXAM - RIGHT EYE: OD_EXAM: NORMAL

## 2025-05-21 NOTE — PROGRESS NOTES
Assessment/Plan   Diagnoses and all orders for this visit:  Hyperopia with presbyopia of both eyes  Pseudophakia of both eyes  -updated spec RX for FTW   -patient currently using NVO specs  -discussed monocular precautions with FTW and polycarbonate lenses  Type 2 diabetes mellitus without retinopathy (Multi)  -no retinopathy observed on exam today od/os, pt ed to continue good BGlc, blood pressure and lipid control, rtc with any changes in vision, otherwise monitor 1 year  Ocular hypertension, bilateral  -no prior hx of glaucoma treatment  -pt reports being monitored and having testing done for glaucoma in the past with her last eye care provider in [place]  -IOP today 25/23  -ONH with NRR thinning  -right nerve has an area of dense vitreous vs old fibrosis over disc   -Baseline OCT RNFL 5/21/25 today OD: borderline sup/inf quad thinning OS: sup and inf quad severe thinning  -testing could be disrupted due to retinal degeneration left eye  -optic disc photos taken today  -based on testing and IOP start Latanoprost QHS OU (5/21/2025)  -Tgoal initially high teens range 17-18  -ed on importance of TXT compliance   Full thickness macular hole of left eye, stage 4  History of detached retina repair  Epiretinal membrane right eye  -full thickness hole noted today left eye  -unknown duration based on patient hx  -peripheral scarring and laser markings c reported hx of prior detachment  -will refer for baseline evaluation with retina    RTC retina for macular hole evaluation   RTC Aleks IOP check, gonio, PACHS 4 months

## 2025-06-05 ENCOUNTER — APPOINTMENT (OUTPATIENT)
Dept: OPHTHALMOLOGY | Facility: CLINIC | Age: 75
End: 2025-06-05
Payer: COMMERCIAL

## 2025-06-05 DIAGNOSIS — H35.341 MACULAR HOLE OF RIGHT EYE: Primary | ICD-10-CM

## 2025-06-05 PROCEDURE — 2023F DILAT RTA XM W/O RTNOPTHY: CPT

## 2025-06-05 PROCEDURE — 92134 CPTRZ OPH DX IMG PST SGM RTA: CPT

## 2025-06-05 PROCEDURE — 99214 OFFICE O/P EST MOD 30 MIN: CPT

## 2025-06-05 ASSESSMENT — VISUAL ACUITY
OS_SC+: -1
OD_SC: 20/20
OS_PH_SC+: -2
OD_SC+: -1
OS_SC: 20/400
OS_PH_SC: 20/200
METHOD: SNELLEN - LINEAR

## 2025-06-05 ASSESSMENT — TONOMETRY
OD_IOP_MMHG: 23
IOP_METHOD: GOLDMANN APPLANATION
OS_IOP_MMHG: 22

## 2025-06-05 ASSESSMENT — SLIT LAMP EXAM - LIDS
COMMENTS: MILD MGD, DERMATOCHALASIS
COMMENTS: MILD MGD, DERMATOCHALASIS

## 2025-06-05 ASSESSMENT — EXTERNAL EXAM - LEFT EYE: OS_EXAM: NORMAL

## 2025-06-05 ASSESSMENT — CUP TO DISC RATIO
OS_RATIO: .85
OD_RATIO: .85

## 2025-06-05 ASSESSMENT — EXTERNAL EXAM - RIGHT EYE: OD_EXAM: NORMAL

## 2025-06-05 NOTE — PROGRESS NOTES
Chronic full thickness macular hole of left eyeH35.342  History of detached retina repair, left eye    OCT 06/05/25     - Right eye (OD): Normal foveal contour, outer and inner retinal laminations. No IRF or SRF    - Left eye (OS): Stage IV FTMH with intact RPE, EZ and ELM at the edges of the hole    #Plan#  - Guarded prognosis due to chronicity  - Discussed r/b/a  of surgery. Patient wishes to proceed  - Schedule for PPV, MP, subretinal BSS, GAS     Pseudophakia of both eyes  -updated spec RX for FTW   -patient currently using NVO specs  -discussed monocular precautions with FTW and polycarbonate lenses    Epiretinal membrane (ERM) of left eyeH35.372  - No metamorphopsia or blurring of vision  - VA 20/25    OCT 06/05/25     - Right eye (OD): Normal foveal contour, intact RPE  outer and inner retinal layers. No IRF or SRF    - Left eye (OS): Stage 1 ERM; no significant retinal thickening, intact RPE, outer and inner retinal layers. No IRF or SRF     #Plan#  - Observe  - RTC in 6 month     Type 2 diabetes mellitus without retinopathy (Multi)  -no retinopathy observed on exam today od/os, pt ed to continue good BGlc, blood pressure and lipid control, rtc with any changes in vision, otherwise monitor 1 year    Ocular hypertension, bilateral  -no prior hx of glaucoma treatment  -pt reports being monitored and having testing done for glaucoma in the past with her last eye care provider in [place]  -IOP today 25/23  -ONH with NRR thinning  -right nerve has an area of dense vitreous vs old fibrosis over disc   -Baseline OCT RNFL 5/21/25 today OD: borderline sup/inf quad thinning OS: sup and inf quad severe thinning  -testing could be disrupted due to retinal degeneration left eye  -optic disc photos taken today  -based on testing and IOP start Latanoprost QHS OU (5/21/2025)  -Tgoal initially high teens range 17-18  -ed on importance of TXT compliance

## 2025-06-12 ENCOUNTER — APPOINTMENT (OUTPATIENT)
Facility: CLINIC | Age: 75
End: 2025-06-12
Payer: MEDICARE

## 2025-06-19 DIAGNOSIS — E11.65 TYPE 2 DIABETES MELLITUS WITH HYPERGLYCEMIA, WITHOUT LONG-TERM CURRENT USE OF INSULIN: ICD-10-CM

## 2025-06-19 DIAGNOSIS — I10 PRIMARY HYPERTENSION: ICD-10-CM

## 2025-06-19 RX ORDER — LOSARTAN POTASSIUM 100 MG/1
100 TABLET ORAL DAILY
Qty: 30 TABLET | Refills: 0 | Status: SHIPPED | OUTPATIENT
Start: 2025-06-19 | End: 2025-07-19

## 2025-06-19 RX ORDER — GLIMEPIRIDE 2 MG/1
2 TABLET ORAL
Qty: 30 TABLET | Refills: 0 | Status: SHIPPED | OUTPATIENT
Start: 2025-06-19 | End: 2025-07-19

## 2025-06-19 NOTE — TELEPHONE ENCOUNTER
Patient needs refills Jardiance 10mg takes one a day  Glimepiride 2 mg takes one a day  Losartan 100mg takes one a day  Pharmacy [facility] [place]  Appt is scheduled for 6/26 but she will be out.

## 2025-06-23 ENCOUNTER — PREP FOR PROCEDURE (OUTPATIENT)
Dept: OPHTHALMOLOGY | Facility: HOSPITAL | Age: 75
End: 2025-06-23
Payer: MEDICARE

## 2025-06-23 ENCOUNTER — HOSPITAL ENCOUNTER (OUTPATIENT)
Facility: CLINIC | Age: 75
Setting detail: OUTPATIENT SURGERY
End: 2025-06-23
Payer: MEDICARE

## 2025-06-23 DIAGNOSIS — H35.342 MACULAR HOLE OF LEFT EYE: Primary | ICD-10-CM

## 2025-06-23 RX ORDER — TROPICAMIDE 10 MG/ML
1 SOLUTION/ DROPS OPHTHALMIC
OUTPATIENT
Start: 2025-06-23 | End: 2025-06-23

## 2025-06-23 RX ORDER — PHENYLEPHRINE HYDROCHLORIDE 25 MG/ML
1 SOLUTION/ DROPS OPHTHALMIC
OUTPATIENT
Start: 2025-06-23 | End: 2025-06-23

## 2025-06-23 RX ORDER — PROPARACAINE HYDROCHLORIDE 5 MG/ML
1 SOLUTION/ DROPS OPHTHALMIC ONCE
OUTPATIENT
Start: 2025-06-23 | End: 2025-06-23

## 2025-06-23 NOTE — H&P
History Of Present Illness  Vee Pimentel is a 75 y.o. female presenting with FT OS.     Past Medical History  She has a past medical history of Anxiety, Arthritis, Basal cell carcinoma of leg, left, Cataract, Depression, Glaucoma, Hyperlipidemia, Hypertension, NPH (normal pressure hydrocephalus) (Multi), Obesity, Sleep apnea, Type 2 diabetes mellitus, Urinary incontinence, Urinary retention, and Vision loss.    Surgical History  She has a past surgical history that includes Appendectomy; Hernia repair; Lumbar puncture; and Tonsillectomy.     Social History  She reports that she has never smoked. She has never used smokeless tobacco. She reports that she does not currently use alcohol. She reports that she does not use drugs.     Allergies  Azithromycin, Glimepiride, Glycerin, Metformin, Penicillins, and Amoxicillin    ROS  Patient denies ocular pain, redness, discharge, decreased vision, double vision, blind spots, flashes, or floaters.     Physical Exam  Not recorded          Assessment/Plan       PPV MP GAS OS       I spent 10 minutes in the professional and overall care of this patient.      Farzad Dugan MD PhD

## 2025-06-26 ENCOUNTER — APPOINTMENT (OUTPATIENT)
Facility: CLINIC | Age: 75
End: 2025-06-26
Payer: MEDICARE

## 2025-06-26 VITALS
HEART RATE: 112 BPM | WEIGHT: 174 LBS | OXYGEN SATURATION: 98 % | HEIGHT: 62 IN | BODY MASS INDEX: 32.02 KG/M2 | SYSTOLIC BLOOD PRESSURE: 124 MMHG | DIASTOLIC BLOOD PRESSURE: 72 MMHG

## 2025-06-26 DIAGNOSIS — E78.2 MIXED HYPERLIPIDEMIA: ICD-10-CM

## 2025-06-26 DIAGNOSIS — E11.65 TYPE 2 DIABETES MELLITUS WITH HYPERGLYCEMIA, WITHOUT LONG-TERM CURRENT USE OF INSULIN: ICD-10-CM

## 2025-06-26 DIAGNOSIS — I10 PRIMARY HYPERTENSION: ICD-10-CM

## 2025-06-26 PROCEDURE — 3074F SYST BP LT 130 MM HG: CPT

## 2025-06-26 PROCEDURE — 99214 OFFICE O/P EST MOD 30 MIN: CPT

## 2025-06-26 PROCEDURE — 4010F ACE/ARB THERAPY RXD/TAKEN: CPT

## 2025-06-26 PROCEDURE — 3008F BODY MASS INDEX DOCD: CPT

## 2025-06-26 PROCEDURE — 1159F MED LIST DOCD IN RCRD: CPT

## 2025-06-26 PROCEDURE — 3078F DIAST BP <80 MM HG: CPT

## 2025-06-26 PROCEDURE — G2211 COMPLEX E/M VISIT ADD ON: HCPCS

## 2025-06-26 RX ORDER — LOSARTAN POTASSIUM 100 MG/1
100 TABLET ORAL DAILY
Qty: 90 TABLET | Refills: 0 | Status: SHIPPED | OUTPATIENT
Start: 2025-06-26 | End: 2025-09-24

## 2025-06-26 RX ORDER — GLIMEPIRIDE 2 MG/1
2 TABLET ORAL
Qty: 90 TABLET | Refills: 0 | Status: SHIPPED | OUTPATIENT
Start: 2025-06-26 | End: 2025-09-24

## 2025-06-26 RX ORDER — AMLODIPINE BESYLATE 5 MG/1
5 TABLET ORAL DAILY
Qty: 90 TABLET | Refills: 0 | Status: SHIPPED | OUTPATIENT
Start: 2025-06-26 | End: 2025-09-24

## 2025-06-26 RX ORDER — ROSUVASTATIN CALCIUM 20 MG/1
20 TABLET, COATED ORAL DAILY
Qty: 90 TABLET | Refills: 0 | Status: SHIPPED | OUTPATIENT
Start: 2025-06-26 | End: 2025-09-24

## 2025-06-26 RX ORDER — LOSARTAN POTASSIUM 100 MG/1
100 TABLET ORAL DAILY
Qty: 90 TABLET | Refills: 0 | Status: SHIPPED | OUTPATIENT
Start: 2025-06-26 | End: 2025-06-26

## 2025-06-26 RX ORDER — AMLODIPINE BESYLATE 5 MG/1
5 TABLET ORAL DAILY
Qty: 90 TABLET | Refills: 0 | Status: SHIPPED | OUTPATIENT
Start: 2025-06-26 | End: 2025-06-26

## 2025-06-26 RX ORDER — ROSUVASTATIN CALCIUM 20 MG/1
20 TABLET, COATED ORAL DAILY
Qty: 90 TABLET | Refills: 0 | Status: SHIPPED | OUTPATIENT
Start: 2025-06-26 | End: 2025-06-26

## 2025-06-26 RX ORDER — GLIMEPIRIDE 2 MG/1
2 TABLET ORAL
Qty: 90 TABLET | Refills: 0 | Status: SHIPPED | OUTPATIENT
Start: 2025-06-26 | End: 2025-06-26

## 2025-06-26 NOTE — PROGRESS NOTES
"Chief Complaint   Patient presents with    Follow-up     Subjective     Vee presents for FU of T2DM, HTN and HLD.     T2DM  - has been taking Jardiance 10mg and glimepiride 2mg daily for the past one month   - denies adverse effects, urinary frequency, dysuria, hypoglycemic episodes, dizziness, light headedness, N/V  - states fasting BG has been persistently in the 150-200's   - last A1c 5/2024 11.0%, up from 8.2% 12/2024    HTN  - has been taking amlodipine 5mg and losartan 100mg daily for the past one month  - does not check BP at home  - denies adverse effects, dizziness, light headedness, HA, CP    HLD  - has been taking rosuvastatin 20mg daily for the past one month  - denies myalgias, other adverse effects   - last lipid panel: , ,     Supplementation:  - Vee brings a list today of supplements and vitamins that she has taken in the past and is inquiring whether they are safe to resume. These include zinc, vitamin E, vitamin D, baby aspirin, cinnamon, vitamin C (including a \"super bio vitamin C\" or time-release formula), ashwagandha, magnesium, berberine, synchroma, Pronac or P3O1, vitamin K, potassium, Chromex, Curemed, collagen brain boost, fermented multi, collagen lean plus, and regular collagen. Expressed concern about potential interactions and redundancy.    Objective   /72   Pulse (!) 112   Ht 1.575 m (5' 2\")   Wt 78.9 kg (174 lb)   SpO2 98%   BMI 31.83 kg/m²     Physical Exam  Vitals reviewed.   Constitutional:       Appearance: Normal appearance.   HENT:      Head: Normocephalic and atraumatic.   Eyes:      Extraocular Movements: Extraocular movements intact.      Pupils: Pupils are equal, round, and reactive to light.   Cardiovascular:      Rate and Rhythm: Normal rate and regular rhythm.      Heart sounds: Normal heart sounds.   Pulmonary:      Effort: Pulmonary effort is normal. No respiratory distress.      Breath sounds: Normal breath sounds.   Musculoskeletal: "      Right lower leg: No edema.      Left lower leg: No edema.   Neurological:      Mental Status: She is alert and oriented to person, place, and time.   Psychiatric:         Mood and Affect: Mood normal.         Speech: Speech is tangential.         Behavior: Behavior normal.       Assessment/Plan      1. Diabetes Mellitus, Type 2  - Repeat A1C, Comprehensive Metabolic Panel (CMP), and lipid panel in approximately two months, or just before the next appointment. Encouraged to monitor glucose levels during the next four to six weeks to provide useful data for follow-up.  - Treatment planned: Continue Jardiance and glimepiride. Goal is to eventually discontinue glimepiride if Jardiance is sufficiently effective, due to the risk of hypoglycemia and associated falls or lightheadedness. Refilled medications for 90 days to ensure supply until the next appointment.    2. Hyperlipidemia  - Assessment: Hyperlipidemia. Currently taking rosuvastatin 20 mg.  - Treatment planned: Continue rosuvastatin 20 mg.  - repeat lipid panel in 6-8 weeks     3. HTN  - stable  - refills provided  - repeat labs in 6-8 weeks    4. Supplement Use  - Referral to [facility]  - Advised that zinc, vitamin E, vitamin D, baby aspirin, cinnamon, vitamin C, ashwagandha, and magnesium are generally acceptable. Unsure about research regarding berberine, synchroma, Pronac or P3O1, Chromex, Curemed, and the various collagen products due to lack of familiarity or concerns about redundancy and potential rebranding of similar ingredients. Counselled on the lack of regulation for supplements compared to medications and the potential for excessive intake to overwhelm kidneys and liver, as well as the financial cost. Encouraged to focus on maximizing natural sources of nutrients through diet. Emphasized that while some natural substances are beneficial, 'ancient medicine' involved obtaining these directly from the earth, not from capsules.

## 2025-07-22 ENCOUNTER — APPOINTMENT (OUTPATIENT)
Facility: CLINIC | Age: 75
End: 2025-07-22
Payer: MEDICARE

## 2025-07-22 VITALS
HEART RATE: 78 BPM | HEIGHT: 62 IN | BODY MASS INDEX: 32.76 KG/M2 | WEIGHT: 178 LBS | SYSTOLIC BLOOD PRESSURE: 124 MMHG | OXYGEN SATURATION: 96 % | DIASTOLIC BLOOD PRESSURE: 78 MMHG

## 2025-07-22 DIAGNOSIS — R42 VERTIGO: Primary | ICD-10-CM

## 2025-07-22 DIAGNOSIS — E11.9 TYPE 2 DIABETES MELLITUS WITHOUT RETINOPATHY (MULTI): ICD-10-CM

## 2025-07-22 DIAGNOSIS — E78.2 MIXED HYPERLIPIDEMIA: ICD-10-CM

## 2025-07-22 DIAGNOSIS — G91.2 NORMAL PRESSURE HYDROCEPHALUS (MULTI): ICD-10-CM

## 2025-07-22 DIAGNOSIS — I10 PRIMARY HYPERTENSION: ICD-10-CM

## 2025-07-22 LAB — POC HEMOGLOBIN A1C: 9 % (ref 4.2–6.5)

## 2025-07-22 ASSESSMENT — PATIENT HEALTH QUESTIONNAIRE - PHQ9
1. LITTLE INTEREST OR PLEASURE IN DOING THINGS: NOT AT ALL
SUM OF ALL RESPONSES TO PHQ9 QUESTIONS 1 AND 2: 0
2. FEELING DOWN, DEPRESSED OR HOPELESS: NOT AT ALL

## 2025-07-22 NOTE — PROGRESS NOTES
Family Medicine Clinic Note    Today's Date: 7/25/2025       HPI: Vee Pimentel is a 75 y.o. female with PMH of  shunt s/p normal pressure hydrocephalus, memory difficulty, gait disturbance, DM type II, HTN, HLD who presents today for follow-up.    -Patient reports that she has a history of normal pressure hydrocephalus  - She notes that she is having dizziness with the room spinning and a buzzing sound in her ear for the past few weeks.  - Chart review indicates patient had her last appointment with neurosurgery, Dr. Sanford, on 10/28/2024.  At this time, patient's  shunt settings were changed.  - Patient notes that her dizziness is worse with changing head positions, such as side bending the head.  - Denies any recent vision changes, floaters, changes in hearing, lightheadedness, falls.     DM type 2  -Pt states that they were asking $200 for Rybelsus so she never took it   -Managed with Jardiance 10mg daily, Januvia 50 mg daily, and Glimepiride 2mg daily   -Last A1c 5/2024 11.0  -Point-of-care Hemoglobin A1c in clinic today noted to be 9.0.  -States that she was having rectal bleeding when dose was increased to Glimepiride 4mg  -Did not tolerate Metformin in the past - intestinal problems   -Monitors blood glucose at home by pricking finger - average readings range from 124 - 150 fasting  -States she takes cinnamon for diabetes.  Also reports that she takes numerous other supplements.  Chart review indicates that patient was referred to [facility] at prior visit.  Patient states that she has an appointment with integrative medicine in March 2026.  Chart review indicates patient has an appointment with integrative medicine, Dr. Franklin, on November 5, 2025.  Patient updated regarding appointment date.  -Feet: Does not follow with podiatrist  -Eyes: Last ophthalmology appointment 6/5/2025 with Dr. Farzad Lyles  -Denies increased thirst/urination/hunger, fatigue, extremity numbness, blurry vision,  lightheadedness, nausea, vomiting     HTN  -Managed with Losartan 100mg daily and Amlodipine 5mg daily   -BP in clinic today: 124/78  -Denies chest pain, palpitations, SOB, lightheadedness, leg swelling     HLD  -Managed with Rosuvastatin 20mg daily   -Denies myalgias  -Last lipid panel 5/12/2025: Cholesterol 272, triglycerides 218,    -Last CMP 5/12/2025, with no elevations in LFTs     Review of Systems: 10 point review of systems negative and noncontributory unless listed in HPI above.     Past Medical History:  Medical History[1]     Past Surgical History:   Surgical History[2]     Family History:  Family History[3]     Social History:  Social History     Socioeconomic History    Marital status: Single     Spouse name: Not on file    Number of children: Not on file    Years of education: Not on file    Highest education level: Not on file   Occupational History    Not on file   Tobacco Use    Smoking status: Never    Smokeless tobacco: Never    Tobacco comments:     No history of anesthesia reactions. No family history of MH.     No illnesses in the past 30 days.     Does not use stairs, per sister-in-law Abril. Pt makes own decisions.     Uses a cane or walker.     Is able to lie flat for 30 minutes.     Vaping Use    Vaping status: Never Used   Substance and Sexual Activity    Alcohol use: Not Currently     Comment: occassional    Drug use: Never    Sexual activity: Defer   Other Topics Concern    Not on file   Social History Narrative    Not on file     Social Drivers of Health     Financial Resource Strain: Patient Declined (2/11/2025)    Overall Financial Resource Strain (CARDIA)     Difficulty of Paying Living Expenses: Patient declined   Food Insecurity: No Food Insecurity (2/11/2025)    Hunger Vital Sign     Worried About Running Out of Food in the Last Year: Never true     Ran Out of Food in the Last Year: Never true   Transportation Needs: No Transportation Needs (3/12/2025)    OASIS :  "Transportation     Lack of Transportation (Medical): No     Lack of Transportation (Non-Medical): No     Patient Unable or Declines to Respond: No   Physical Activity: Not on file   Stress: Not on file   Social Connections: Feeling Socially Integrated (3/12/2025)    OASIS : Social Isolation     Frequency of experiencing loneliness or isolation: Rarely   Intimate Partner Violence: Not At Risk (2/11/2025)    Humiliation, Afraid, Rape, and Kick questionnaire     Fear of Current or Ex-Partner: No     Emotionally Abused: No     Physically Abused: No     Sexually Abused: No   Housing Stability: Patient Declined (2/11/2025)    Housing Stability Vital Sign     Unable to Pay for Housing in the Last Year: Patient declined     Number of Times Moved in the Last Year: 0     Homeless in the Last Year: Patient declined       Current Medications:   Current Medications[4]    Allergies:   Allergies[5]     Objective:  Vitals: Blood pressure 124/78, pulse 78, height 1.575 m (5' 2\"), weight 80.7 kg (178 lb), SpO2 96%.  Body mass index is 32.56 kg/m².     Physical Exam:  GENERAL: Alert, oriented, well nourished, age-appropriate, pleasant, in no acute distress.   HEENT: Head normocephalic, atraumatic. EOMI. Mucous membranes moist.   CARDIOVASCULAR: Heart with regular rate and rhythm, normal S1/S2, no murmurs; normal peripheral pulses- radial and dorsalis pedis palpable.  LUNGS: Clear to auscultation bilaterally without wheezing, rhonchi or rales; good respiratory effort, no increased work of breathing.  ABDOMEN: Soft, non-tender, non-distended, no masses appreciated. No guarding or rebound. Normoactive bowel sounds in all 4 quadrants.   EXTREMITIES: No deformities, cyanosis, or edema bilaterally in upper and lower extremities.   NEURO: Alert and oriented to person, place, and time. No focal deficits.   PSYCH: Appropriate mood and affect.  SKIN: No rashes or lesions appreciated    Assessment/Plan   Diagnoses and all orders for this " visit:  Normal pressure hydrocephalus (Multi)  - Patient was evaluated by neurosurgery October 2020 for, at that time  shunt settings were changed  -Given that the patient is complaining of increasing dizziness, advised patient to schedule follow-up appointment with neurosurgery within the next few weeks in the case that shunt settings need to be adjusted again.  Vertigo  -[place]-Hallpike maneuver not performed in clinic given patient's age, gait dysfunction, and history of normal pressure hydrocephalus.  However, patient's dizziness is exacerbated by positional head changes.  Suspect BPPV.  -Have provided patient with Epley maneuver printout, to be completed at home.  -If there is no resolution of symptoms, will consider physical therapy referral.  Type 2 diabetes mellitus without retinopathy (Multi)  -     POCT glycosylated hemoglobin (Hb A1C) manually resulted -HgA1c in clinic today noted to be 9.0  -Continue Jardiance 10mg daily, Januvia 50 mg daily, and Glimepiride 2mg daily   -Will attempt to send Rybelsus prescription to  retail pharmacy for better coverage at this time for this patient, given that her hemoglobin A1c is 9.0.  She has tried metformin with GI side effects.  Unable to increase glimepiride dose secondary to history of rectal bleeding patient has noted with glimepiride.  - Prescribed semaglutide (Rybelsus) 3 mg tablet; Take 1 tablet (3 mg) by mouth once daily.  Primary hypertension  -Well-controlled  -Continue losartan 100 mg daily and amlodipine 5 mg daily  -Continue to monitor blood pressures at home  Mixed hyperlipidemia  - Continue rosuvastatin 20 mg daily    -I have reminded patient to complete blood work which was prescribed at previous visit, including hemoglobin A1c, CMP, and lipid panel.    In addition, will consider completing MoCA at next clinic visit.     Follow-up in 1 month for diabetes and MoCA assessment.    All questions answered. Patient understands and agrees to the plan.  Patient discussed with attending, Dr. Contreras.    Monica Doyle DO  PGY-2, [facility]         [1]   Past Medical History:  Diagnosis Date    Anxiety     Arthritis     Basal cell carcinoma of leg, left     Cataract     Cerebral ventriculomegaly     CTS (carpal tunnel syndrome)     Depression     Glaucoma     History of pneumonia     Hyperlipidemia     Hypertension     NPH (normal pressure hydrocephalus) (Multi)     Obesity     Retinal detachment     Sleep apnea     Tinnitus     Torn meniscus     Type 2 diabetes mellitus     Urinary incontinence     Urinary retention     Vertigo     Vision loss     left eye fibrosis   [2]   Past Surgical History:  Procedure Laterality Date    APPENDECTOMY      CSF SHUNT      HERNIA REPAIR      LUMBAR PUNCTURE      TONSILLECTOMY     [3]   Family History  Problem Relation Name Age of Onset    Dementia Mother      Heart failure Mother      Fibromyalgia Mother      Other (ABDOMINAL ANER) Father     [4]   Current Outpatient Medications:     amLODIPine (Norvasc) 5 mg tablet, Take 1 tablet (5 mg) by mouth once daily., Disp: 90 tablet, Rfl: 0    empagliflozin (Jardiance) 10 mg tablet, Take 1 tablet (10 mg) by mouth once daily., Disp: 90 tablet, Rfl: 0    glimepiride (Amaryl) 2 mg tablet, Take 1 tablet (2 mg) by mouth once daily in the morning. Take before meals., Disp: 90 tablet, Rfl: 0    latanoprost (Xalatan) 0.005 % ophthalmic solution, Administer 1 drop into both eyes once daily at bedtime., Disp: 7.5 mL, Rfl: 3    losartan (Cozaar) 100 mg tablet, Take 1 tablet (100 mg) by mouth once daily., Disp: 90 tablet, Rfl: 0    rosuvastatin (Crestor) 20 mg tablet, Take 1 tablet (20 mg) by mouth once daily., Disp: 90 tablet, Rfl: 0    ascorbic acid (Vitamin C) 1,000 mg tablet, Take 1 tablet (1,000 mg) by mouth once daily. (Patient not taking: Reported on 5/8/2025), Disp: , Rfl:     ascorbic acid/collagen hydr (COLLAGEN SKIN RENEWAL ORAL), Take 4 tablets by  mouth once daily. PATIENT TAKES 4 DIFFERENT TYPES OF COLLAGEN  Indications: vitamin deficiency (Patient not taking: Reported on 5/8/2025), Disp: , Rfl:     ashwagandha root extract 300 mg capsule, Take 300 mg by mouth 2 times a day. Indications: vitamin deficiency (Patient not taking: Reported on 5/8/2025), Disp: , Rfl:     berberine chloride 500 mg capsule, Take 1,500 mg by mouth 2 times a day. SELF REPORTED  Indications: vitamin deficiency (Patient not taking: Reported on 5/8/2025), Disp: , Rfl:     blood sugar diagnostic (Blood Glucose Test) strip, 1 strip 2 times a day. (Patient not taking: Reported on 5/8/2025), Disp: 100 strip, Rfl: 1    cholecalciferol (Vitamin D3) 50 mcg (2,000 unit) capsule, Take 1 capsule (2,000 Units) by mouth early in the morning.. (Patient not taking: Reported on 5/8/2025), Disp: , Rfl:     chromium picolinate 200 mcg tablet tablet, Take 1 tablet (200 mcg) by mouth twice a day. (Patient not taking: Reported on 5/8/2025), Disp: , Rfl:     CURCUMIN, BULK, MISC, Take 750 mg by mouth once daily. Indications: vitamin deficiency (Patient not taking: Reported on 5/8/2025), Disp: , Rfl:     garlic 400 mg tablet, Take 400 mg by mouth 2 times a day. Indications: vitamin deficiency, Disp: , Rfl:     MAGNESIUM GLYCINATE ORAL, Take 400 mg by mouth early in the morning. Indications: vitamin deficiency (Patient not taking: Reported on 5/8/2025), Disp: , Rfl:     POTASSIUM CITRATE ORAL, Take 99 mg by mouth early in the morning. Indications: vitamin deficiency (Patient not taking: Reported on 5/8/2025), Disp: , Rfl:     semaglutide (Rybelsus) 3 mg tablet, Take 1 tablet (3 mg) by mouth once daily., Disp: 30 tablet, Rfl: 0    SITagliptin phosphate (Januvia) 50 mg tablet, Take 1 tablet (50 mg) by mouth once daily., Disp: 30 tablet, Rfl: 0    vitamin E 180 mg (400 unit) capsule, Take 1 capsule (400 Units) by mouth twice a day. SELF REPORTED (Patient not taking: Reported on 5/8/2025), Disp: , Rfl:   [5]    Allergies  Allergen Reactions    Azithromycin Angioedema    Glimepiride GI bleeding     Dose was reduced and is now tolerated.    Glycerin Unknown    Latex Other    Metformin GI Upset    Penicillins Other    Amoxicillin Rash

## 2025-07-24 ENCOUNTER — TELEPHONE (OUTPATIENT)
Facility: CLINIC | Age: 75
End: 2025-07-24
Payer: MEDICARE

## 2025-07-24 DIAGNOSIS — E11.65 TYPE 2 DIABETES MELLITUS WITH HYPERGLYCEMIA, WITHOUT LONG-TERM CURRENT USE OF INSULIN: Primary | ICD-10-CM

## 2025-07-24 PROCEDURE — RXMED WILLOW AMBULATORY MEDICATION CHARGE

## 2025-07-24 NOTE — TELEPHONE ENCOUNTER
Tamera([phone]) called , the Rybelsus is still $200 at [facility] pharmacy.  She can't afford that.  What other option is there?

## 2025-07-25 PROBLEM — R42 VERTIGO: Status: ACTIVE | Noted: 2025-07-25

## 2025-08-01 ENCOUNTER — PHARMACY VISIT (OUTPATIENT)
Dept: PHARMACY | Facility: CLINIC | Age: 75
End: 2025-08-01
Payer: MEDICARE

## 2025-08-25 DIAGNOSIS — E11.65 TYPE 2 DIABETES MELLITUS WITH HYPERGLYCEMIA, WITHOUT LONG-TERM CURRENT USE OF INSULIN: ICD-10-CM

## 2025-08-25 DIAGNOSIS — E78.2 MIXED HYPERLIPIDEMIA: ICD-10-CM

## 2025-08-25 DIAGNOSIS — I10 PRIMARY HYPERTENSION: ICD-10-CM

## 2025-08-27 DIAGNOSIS — E11.65 TYPE 2 DIABETES MELLITUS WITH HYPERGLYCEMIA, WITHOUT LONG-TERM CURRENT USE OF INSULIN: ICD-10-CM

## 2025-08-29 ENCOUNTER — TELEPHONE (OUTPATIENT)
Facility: CLINIC | Age: 75
End: 2025-08-29
Payer: MEDICARE

## 2025-09-02 ENCOUNTER — DOCUMENTATION (OUTPATIENT)
Facility: CLINIC | Age: 75
End: 2025-09-02

## 2025-09-02 ENCOUNTER — APPOINTMENT (OUTPATIENT)
Facility: CLINIC | Age: 75
End: 2025-09-02
Payer: MEDICARE

## 2025-09-17 ENCOUNTER — APPOINTMENT (OUTPATIENT)
Dept: OPHTHALMOLOGY | Facility: CLINIC | Age: 75
End: 2025-09-17
Payer: MEDICARE

## 2025-10-07 ENCOUNTER — APPOINTMENT (OUTPATIENT)
Facility: CLINIC | Age: 75
End: 2025-10-07
Payer: MEDICARE

## 2025-11-05 ENCOUNTER — APPOINTMENT (OUTPATIENT)
Dept: INTEGRATIVE MEDICINE | Facility: CLINIC | Age: 75
End: 2025-11-05
Payer: MEDICARE

## (undated) DEVICE — SOLUTION, INJECTION, USP, SODIUM CHLORIDE 0.9%, .9 NACL, 250 ML, BAG

## (undated) DEVICE — APPLICATOR, CHLORAPREP, W/ORANGE TINT, 26ML

## (undated) DEVICE — CATHETER, RED RUBBER 14FR

## (undated) DEVICE — TROCAR, OPTICAL, BLADELESS, 12MM, THREADED, 100MM LENGTH

## (undated) DEVICE — DRAPE, TIBURON, SPLIT SHEET, REINF ADH STRIP, 77X122

## (undated) DEVICE — DRESSING, ADHESIVE, ISLAND, TELFA, 2 X 3.75 IN, LF

## (undated) DEVICE — TUBING, SUCTION, CONNECTING, STERILE 0.25 X 120 IN., LF

## (undated) DEVICE — DRESSING, ISLAND, TELFA, 4 X 5 IN

## (undated) DEVICE — ADHESIVE, SKIN, MASTISOL, 2/3 CC VIAL

## (undated) DEVICE — DRAPE, SHEET, 17 X 23 IN

## (undated) DEVICE — SUTURE, SILK, 2-0, TIES, 12-30 IN, BLACK

## (undated) DEVICE — APPLICATOR, PREP, CHLORAPREP, W/ORANGE TINT, 10.5ML

## (undated) DEVICE — GOWN, SURGICAL, SMARTGOWN, XLARGE, STERILE

## (undated) DEVICE — INTRODUCER KIT, PERCUTANEOUS, 10 FR

## (undated) DEVICE — PAD, GROUNDING, ELECTROSURGICAL, W/9 FT CABLE, POLYHESIVE II, ADULT, LF

## (undated) DEVICE — PAD, GROUNDING, ELECTROSURGICAL, W/15 FT CABLE, POLYHESIVE II, ADULT, LF

## (undated) DEVICE — ADHESIVE, SKIN, LIQUIBAND EXCEED

## (undated) DEVICE — SUTURE, MONOCRYL, 4-0, 18 IN, PS2, UNDYED

## (undated) DEVICE — Device

## (undated) DEVICE — REST, HEAD, BAGEL, 9 IN

## (undated) DEVICE — COVER, CART, 45 X 27 X 48 IN, CLEAR

## (undated) DEVICE — PREP, IODOPHOR, W/ALCOHOL, DURAPREP, W/APPLICATOR, 26 CC

## (undated) DEVICE — ANTIFOG, SOLUTION, FOG-OUT

## (undated) DEVICE — TUBE SET, PNEUMOCLEAR, SMOKE EVACU, HIGH-FLOW

## (undated) DEVICE — BAG, DECANTER

## (undated) DEVICE — TROCAR, KII OPTICAL BLADELESS 5MM Z THREAD 100MM LNGTH

## (undated) DEVICE — SUTURE, VICRYL, 0, 27 IN, UR-6, VIOLET

## (undated) DEVICE — DRAPE, INCISE, ANTIMICROBIAL, IOBAN 2, STERI DRAPE, 23 X 33 IN, DISPOSABLE, STERILE

## (undated) DEVICE — SUTURE, VICRYL, 4-0, 27IN, RB-1

## (undated) DEVICE — PREP, SKIN, DURAPREP, 6 CC

## (undated) DEVICE — DRESSING, GAUZE, 16 PLY, 4 X 4 IN, STERILE

## (undated) DEVICE — BOOT, SUTURE-AID, YELLOW, STERILE, LF

## (undated) DEVICE — BUR, PERFORATOR, CRANIAL, ACRA-CUT 14/11MM, CDM

## (undated) DEVICE — MARKER, SKIN, RULER AND LABEL PACK, CUSTOM

## (undated) DEVICE — DRAPE, IRRIGATION, W/POUCH, ADHESIVE STRIP, STERI DRAPE, 19 X 23 IN, DISPOSABLE, STERILE

## (undated) DEVICE — ADAPTER, LUER STUB, 16 G

## (undated) DEVICE — MANIFOLD, 4 PORT NEPTUNE STANDARD

## (undated) DEVICE — CATHETER, URETHRAL, ROBNEL,  8 FR, 16 IN, LF, RED

## (undated) DEVICE — COVER, TABLE, UHC

## (undated) DEVICE — SUTURE, SILK, 2-0, 18 IN, BLACK

## (undated) DEVICE — BUR, ACORN 6MM PRECISION